# Patient Record
Sex: MALE | Race: BLACK OR AFRICAN AMERICAN | Employment: UNEMPLOYED | URBAN - METROPOLITAN AREA
[De-identification: names, ages, dates, MRNs, and addresses within clinical notes are randomized per-mention and may not be internally consistent; named-entity substitution may affect disease eponyms.]

---

## 2017-05-05 ENCOUNTER — OFFICE VISIT (OUTPATIENT)
Dept: ORTHOPEDIC SURGERY | Age: 52
End: 2017-05-05

## 2017-05-05 VITALS
SYSTOLIC BLOOD PRESSURE: 134 MMHG | HEART RATE: 74 BPM | BODY MASS INDEX: 25.34 KG/M2 | DIASTOLIC BLOOD PRESSURE: 75 MMHG | WEIGHT: 177 LBS | HEIGHT: 70 IN

## 2017-05-05 DIAGNOSIS — M25.561 RIGHT KNEE PAIN, UNSPECIFIED CHRONICITY: Primary | ICD-10-CM

## 2017-05-05 DIAGNOSIS — M11.20 CHONDROCALCINOSIS: ICD-10-CM

## 2017-05-05 PROCEDURE — 20610 DRAIN/INJ JOINT/BURSA W/O US: CPT | Performed by: ORTHOPAEDIC SURGERY

## 2017-05-05 PROCEDURE — 99202 OFFICE O/P NEW SF 15 MIN: CPT | Performed by: ORTHOPAEDIC SURGERY

## 2017-05-05 PROCEDURE — 73562 X-RAY EXAM OF KNEE 3: CPT | Performed by: ORTHOPAEDIC SURGERY

## 2017-05-05 RX ORDER — BETAMETHASONE SODIUM PHOSPHATE AND BETAMETHASONE ACETATE 3; 3 MG/ML; MG/ML
12 INJECTION, SUSPENSION INTRA-ARTICULAR; INTRALESIONAL; INTRAMUSCULAR; SOFT TISSUE ONCE
Status: DISCONTINUED | OUTPATIENT
Start: 2017-05-05 | End: 2020-02-27 | Stop reason: HOSPADM

## 2017-05-26 RX ORDER — NAPROXEN 500 MG/1
500 TABLET ORAL 2 TIMES DAILY
Qty: 20 TABLET | Refills: 2 | Status: SHIPPED | OUTPATIENT
Start: 2017-05-26 | End: 2017-06-07

## 2017-10-25 RX ORDER — NAPROXEN 500 MG/1
500 TABLET ORAL 2 TIMES DAILY
Qty: 20 TABLET | Refills: 2 | Status: ON HOLD | OUTPATIENT
Start: 2017-10-25 | End: 2020-02-27 | Stop reason: HOSPADM

## 2018-03-29 ENCOUNTER — HOSPITAL ENCOUNTER (OUTPATIENT)
Dept: OTHER | Age: 53
Discharge: HOME | End: 2018-03-29
Attending: INTERNAL MEDICINE | Admitting: INTERNAL MEDICINE

## 2018-03-29 LAB
A/G RATIO: 1.5 (ref 1.1–2.2)
ALBUMIN SERPL-MCNC: 4.5 G/DL (ref 3.4–5)
ALP BLD-CCNC: 62 U/L (ref 40–129)
ALT SERPL-CCNC: 21 U/L (ref 10–40)
ANION GAP SERPL CALCULATED.3IONS-SCNC: 11 MMOL/L (ref 3–16)
AST SERPL-CCNC: 24 U/L (ref 15–37)
BASOPHILS ABSOLUTE: 0 K/UL (ref 0–0.2)
BASOPHILS RELATIVE PERCENT: 0.7 %
BILIRUB SERPL-MCNC: 0.4 MG/DL (ref 0–1)
BUN BLDV-MCNC: 15 MG/DL (ref 7–20)
CALCIUM SERPL-MCNC: 8.7 MG/DL (ref 8.3–10.6)
CHLORIDE BLD-SCNC: 102 MMOL/L (ref 99–110)
CO2: 25 MMOL/L (ref 21–32)
CREAT SERPL-MCNC: 1 MG/DL (ref 0.9–1.3)
EOSINOPHILS ABSOLUTE: 0.1 K/UL (ref 0–0.6)
EOSINOPHILS RELATIVE PERCENT: 1.3 %
ESTIMATED AVERAGE GLUCOSE: 119.8 MG/DL
GFR AFRICAN AMERICAN: >60
GFR NON-AFRICAN AMERICAN: >60
GLOBULIN: 3.1 G/DL
GLUCOSE BLD-MCNC: 79 MG/DL (ref 70–99)
HBA1C MFR BLD: 5.8 %
HCT VFR BLD CALC: 44.5 % (ref 40.5–52.5)
HEMOGLOBIN: 15.1 G/DL (ref 13.5–17.5)
LYMPHOCYTES ABSOLUTE: 2 K/UL (ref 1–5.1)
LYMPHOCYTES RELATIVE PERCENT: 39.9 %
MCH RBC QN AUTO: 33.5 PG (ref 26–34)
MCHC RBC AUTO-ENTMCNC: 33.8 G/DL (ref 31–36)
MCV RBC AUTO: 99.1 FL (ref 80–100)
MONOCYTES ABSOLUTE: 0.6 K/UL (ref 0–1.3)
MONOCYTES RELATIVE PERCENT: 11.6 %
NEUTROPHILS ABSOLUTE: 2.3 K/UL (ref 1.7–7.7)
NEUTROPHILS RELATIVE PERCENT: 46.5 %
PDW BLD-RTO: 15.1 % (ref 12.4–15.4)
PLATELET # BLD: 135 K/UL (ref 135–450)
PMV BLD AUTO: 10.8 FL (ref 5–10.5)
POTASSIUM SERPL-SCNC: 4.5 MMOL/L (ref 3.5–5.1)
RBC # BLD: 4.49 M/UL (ref 4.2–5.9)
SODIUM BLD-SCNC: 138 MMOL/L (ref 136–145)
TOTAL PROTEIN: 7.6 G/DL (ref 6.4–8.2)
TSH SERPL DL<=0.05 MIU/L-ACNC: 0.75 UIU/ML (ref 0.27–4.2)
WBC # BLD: 5 K/UL (ref 4–11)

## 2018-03-31 LAB
CHOLESTEROL, TOTAL: 125 MG/DL
EER LIPOPROFILE BY NMR: ABNORMAL
HDL PARTICLE NO, NMR: 38.6 UMOL/L
HDL SIZE: 9.4 NM
HDLC SERPL-MCNC: 63 MG/DL (ref 40–59)
LARGE HDL PARTICLE, NMR: 9.2 UMOL/L
LARGE VLDL PARTICLE, NMR: 6.8 NMOL/L
LDL CHOLESTEROL: 42 MG/DL
LDL PARTICLE NUMBER, NMR: 418 NMOL/L
LDL PARTICLE SIZE: 21.1 NM
SEX HORMONE BINDING GLOBULIN: 34 NMOL/L (ref 11–80)
SMALL LDL PARTICLE, NMR: <165 NMOL/L
TESTOSTERONE FREE-NONMALE: 101.2 PG/ML (ref 47–244)
TESTOSTERONE TOTAL: 492 NG/DL (ref 220–1000)
TRIGL SERPL-MCNC: 102 MG/DL (ref 30–149)
VLDL SIZE: 54.5 NM

## 2019-07-07 ENCOUNTER — HOSPITAL ENCOUNTER (EMERGENCY)
Age: 54
Discharge: HOME | End: 2019-07-07
Attending: EMERGENCY MEDICINE

## 2019-07-07 VITALS
SYSTOLIC BLOOD PRESSURE: 150 MMHG | HEART RATE: 99 BPM | BODY MASS INDEX: 23.09 KG/M2 | WEIGHT: 161.3 LBS | HEIGHT: 70 IN | DIASTOLIC BLOOD PRESSURE: 91 MMHG | OXYGEN SATURATION: 98 % | RESPIRATION RATE: 14 BRPM | TEMPERATURE: 97.3 F

## 2019-07-07 DIAGNOSIS — K05.219 ACUTE PERIODONTAL ABSCESS: ICD-10-CM

## 2019-07-07 DIAGNOSIS — A69.1: Primary | ICD-10-CM

## 2019-07-07 DIAGNOSIS — K02.9 DENTAL DECAY: ICD-10-CM

## 2019-07-07 PROCEDURE — 6370000000 HC RX 637 (ALT 250 FOR IP): Performed by: PHYSICIAN ASSISTANT

## 2019-07-07 PROCEDURE — 99282 EMERGENCY DEPT VISIT SF MDM: CPT

## 2019-07-07 PROCEDURE — 6370000000 HC RX 637 (ALT 250 FOR IP): Performed by: EMERGENCY MEDICINE

## 2019-07-07 RX ORDER — LIDOCAINE HYDROCHLORIDE 20 MG/ML
15 SOLUTION OROPHARYNGEAL ONCE
Status: COMPLETED | OUTPATIENT
Start: 2019-07-07 | End: 2019-07-07

## 2019-07-07 RX ORDER — CLINDAMYCIN HYDROCHLORIDE 150 MG/1
450 CAPSULE ORAL 3 TIMES DAILY
Qty: 63 CAPSULE | Refills: 0 | Status: SHIPPED | OUTPATIENT
Start: 2019-07-07 | End: 2019-07-14

## 2019-07-07 RX ORDER — CHLORHEXIDINE GLUCONATE 0.12 MG/ML
15 RINSE ORAL 2 TIMES DAILY
Qty: 420 ML | Refills: 0 | Status: SHIPPED | OUTPATIENT
Start: 2019-07-07 | End: 2019-07-21

## 2019-07-07 RX ORDER — CLINDAMYCIN HYDROCHLORIDE 150 MG/1
300 CAPSULE ORAL ONCE
Status: COMPLETED | OUTPATIENT
Start: 2019-07-07 | End: 2019-07-07

## 2019-07-07 RX ORDER — HYDROCODONE BITARTRATE AND ACETAMINOPHEN 5; 325 MG/1; MG/1
1 TABLET ORAL EVERY 6 HOURS PRN
Qty: 12 TABLET | Refills: 0 | Status: SHIPPED | OUTPATIENT
Start: 2019-07-07 | End: 2019-07-10

## 2019-07-07 RX ORDER — HYDROCODONE BITARTRATE AND ACETAMINOPHEN 5; 325 MG/1; MG/1
1 TABLET ORAL ONCE
Status: DISCONTINUED | OUTPATIENT
Start: 2019-07-07 | End: 2019-07-07

## 2019-07-07 RX ORDER — IBUPROFEN 200 MG
400 TABLET ORAL ONCE
Status: COMPLETED | OUTPATIENT
Start: 2019-07-07 | End: 2019-07-07

## 2019-07-07 RX ADMIN — CLINDAMYCIN HYDROCHLORIDE 300 MG: 150 CAPSULE ORAL at 10:01

## 2019-07-07 RX ADMIN — IBUPROFEN 400 MG: 200 TABLET, FILM COATED ORAL at 10:01

## 2019-07-07 RX ADMIN — LIDOCAINE HYDROCHLORIDE 15 ML: 20 SOLUTION ORAL; TOPICAL at 10:01

## 2019-07-07 ASSESSMENT — PAIN SCALES - GENERAL: PAINLEVEL_OUTOF10: 8

## 2019-07-07 ASSESSMENT — ENCOUNTER SYMPTOMS
FACIAL SWELLING: 1
VOMITING: 0
TROUBLE SWALLOWING: 0
NAUSEA: 0

## 2019-07-07 NOTE — ED NOTES
Pt states understanding of discharge instructions. Pt agrees to follow up with referral. Pt denies any further needs at this time. Pt ambulated to exit, denies need for wheelchair, gait steady, all pt belongings with pt.         Kadie Chinchilla RN  07/07/19 6770

## 2019-07-07 NOTE — ED PROVIDER NOTES
I independently performed a history and physical on [facility]. All diagnostic, treatment, and disposition decisions were made by myself in conjunction with the advanced practice provider. Briefly, this is a 48 y.o. male here for dental pain and gum disease. The patient has had the pain for the last couple of days, but he admits he does not take good care of his mouth. The pain is severe, rated 8 out of 10. He is also developed swelling above right upper molar. He does not currently have a dentist.  He has not had any shortness of breath or breathing problems. He denies fever or chills. .    On exam, the patient appears well-hydrated, well-nourished, and in no acute distress. Mucous membranes are moist. Speech is clear. Breathing is unlabored. Skin is dry. Mental status is normal. The patient moves all extremities and is without facial droop. Patient has diffusely gray-colored gums with hyperemia and swelling. He has no active bleeding. He also has extensive dental decay in all of his teeth. He has mild swelling in the right face localized over the right upper teeth. There is no active drainage. Airway is intact. He has no stridor. Breathing is unlabored. He is awake, alert, and oriented. Patient Referrals:   Follow up with one of the dentists listed in your discharge paperwork    Schedule an appointment as soon as possible for a visit   follow up with a dentists as soon as possible within the next 1-3 days    [facility] Emergency Department  [address] [place] [address]  [phone]    As needed, If symptoms worsen or new symptoms develop      Discharge Medications:  Discharge Medication List as of 7/7/2019  9:58 AM      START taking these medications    Details   clindamycin (CLEOCIN) 150 MG capsule Take 3 capsules by mouth 3 times daily for 7 days, Disp-63 capsule, R-0Print      HYDROcodone-acetaminophen (NORCO) 5-325 MG per tablet Take 1 tablet by mouth every 6 hours as needed for Pain for up to 3 days. , Disp-12 tablet, R-0Print      chlorhexidine (PERIDEX) 0.12 % solution Take 15 mLs by mouth 2 times daily for 14 days Swish and spit, Disp-420 mL, R-0Print             FINAL IMPRESSION  1. Acute necrotizing gingivitis    2. Acute periodontal abscess    3. Dental decay        Blood pressure (!) 150/91, pulse 99, temperature 97.3 °F (36.3 °C), temperature source Infrared, resp. rate 14, height 5' 10\" (1.778 m), weight 161 lb 4.8 oz (73.2 kg), SpO2 98 %.      For further details of [facility] emergency department encounter, please see documentation by advanced practice provider, ANTONIA Hobbs.        Bc Flores MD  07/08/19 8242

## 2020-02-24 ENCOUNTER — APPOINTMENT (OUTPATIENT)
Dept: GENERAL RADIOLOGY | Age: 55
DRG: 192 | End: 2020-02-24
Payer: COMMERCIAL

## 2020-02-24 ENCOUNTER — HOSPITAL ENCOUNTER (INPATIENT)
Age: 55
LOS: 3 days | Discharge: HOME | DRG: 192 | End: 2020-02-27
Attending: EMERGENCY MEDICINE | Admitting: INTERNAL MEDICINE
Payer: COMMERCIAL

## 2020-02-24 PROBLEM — I50.9 HEART FAILURE, UNSPECIFIED (HCC): Status: ACTIVE | Noted: 2020-02-24

## 2020-02-24 LAB
ALBUMIN SERPL-MCNC: 4.1 G/DL (ref 3.4–5)
ALP BLD-CCNC: 69 U/L (ref 40–129)
ALT SERPL-CCNC: 53 U/L (ref 10–40)
ANION GAP SERPL CALCULATED.3IONS-SCNC: 15 MMOL/L (ref 3–16)
AST SERPL-CCNC: 33 U/L (ref 15–37)
BASE EXCESS VENOUS: -8.7 MMOL/L (ref -3–3)
BILIRUB SERPL-MCNC: 1.2 MG/DL (ref 0–1)
BILIRUBIN DIRECT: 0.3 MG/DL (ref 0–0.3)
BILIRUBIN, INDIRECT: 0.9 MG/DL (ref 0–1)
BUN BLDV-MCNC: 19 MG/DL (ref 7–20)
CALCIUM SERPL-MCNC: 9 MG/DL (ref 8.3–10.6)
CARBOXYHEMOGLOBIN: 6.8 % (ref 0–1.5)
CHLORIDE BLD-SCNC: 106 MMOL/L (ref 99–110)
CO2: 19 MMOL/L (ref 21–32)
CREAT SERPL-MCNC: 1 MG/DL (ref 0.9–1.3)
GFR AFRICAN AMERICAN: >60
GFR NON-AFRICAN AMERICAN: >60
GLUCOSE BLD-MCNC: 108 MG/DL (ref 70–99)
HCO3 VENOUS: 11 MMOL/L (ref 23–29)
LACTIC ACID: 1.3 MMOL/L (ref 0.4–2)
METHEMOGLOBIN VENOUS: 0.3 %
O2 CONTENT, VEN: 16 VOL %
O2 SAT, VEN: 100 %
O2 THERAPY: ABNORMAL
PCO2, VEN: ABNORMAL MMHG (ref 40–50)
PH VENOUS: 7.54 (ref 7.35–7.45)
PO2, VEN: 182 MMHG (ref 25–40)
POTASSIUM REFLEX MAGNESIUM: 4.3 MMOL/L (ref 3.5–5.1)
PRO-BNP: 3415 PG/ML (ref 0–124)
SODIUM BLD-SCNC: 140 MMOL/L (ref 136–145)
TCO2 CALC VENOUS: 26 MMOL/L
TOTAL PROTEIN: 7.2 G/DL (ref 6.4–8.2)
TROPONIN: <0.01 NG/ML
TROPONIN: <0.01 NG/ML

## 2020-02-24 PROCEDURE — 6370000000 HC RX 637 (ALT 250 FOR IP): Performed by: EMERGENCY MEDICINE

## 2020-02-24 PROCEDURE — 36415 COLL VENOUS BLD VENIPUNCTURE: CPT

## 2020-02-24 PROCEDURE — 80048 BASIC METABOLIC PNL TOTAL CA: CPT

## 2020-02-24 PROCEDURE — 94761 N-INVAS EAR/PLS OXIMETRY MLT: CPT

## 2020-02-24 PROCEDURE — 83605 ASSAY OF LACTIC ACID: CPT

## 2020-02-24 PROCEDURE — 99285 EMERGENCY DEPT VISIT HI MDM: CPT

## 2020-02-24 PROCEDURE — 2580000003 HC RX 258: Performed by: PHYSICIAN ASSISTANT

## 2020-02-24 PROCEDURE — 80076 HEPATIC FUNCTION PANEL: CPT

## 2020-02-24 PROCEDURE — 84484 ASSAY OF TROPONIN QUANT: CPT

## 2020-02-24 PROCEDURE — 1200000000 HC SEMI PRIVATE

## 2020-02-24 PROCEDURE — 83880 ASSAY OF NATRIURETIC PEPTIDE: CPT

## 2020-02-24 PROCEDURE — 84443 ASSAY THYROID STIM HORMONE: CPT

## 2020-02-24 PROCEDURE — 94640 AIRWAY INHALATION TREATMENT: CPT

## 2020-02-24 PROCEDURE — 71046 X-RAY EXAM CHEST 2 VIEWS: CPT

## 2020-02-24 PROCEDURE — 6360000002 HC RX W HCPCS: Performed by: EMERGENCY MEDICINE

## 2020-02-24 PROCEDURE — 82803 BLOOD GASES ANY COMBINATION: CPT

## 2020-02-24 PROCEDURE — 96374 THER/PROPH/DIAG INJ IV PUSH: CPT

## 2020-02-24 PROCEDURE — 93005 ELECTROCARDIOGRAM TRACING: CPT | Performed by: EMERGENCY MEDICINE

## 2020-02-24 RX ORDER — FUROSEMIDE 10 MG/ML
40 INJECTION INTRAMUSCULAR; INTRAVENOUS 2 TIMES DAILY
Status: DISCONTINUED | OUTPATIENT
Start: 2020-02-24 | End: 2020-02-25

## 2020-02-24 RX ORDER — ONDANSETRON 2 MG/ML
4 INJECTION INTRAMUSCULAR; INTRAVENOUS EVERY 6 HOURS PRN
Status: DISCONTINUED | OUTPATIENT
Start: 2020-02-24 | End: 2020-02-27

## 2020-02-24 RX ORDER — ACETAMINOPHEN 325 MG/1
650 TABLET ORAL EVERY 6 HOURS PRN
Status: DISCONTINUED | OUTPATIENT
Start: 2020-02-24 | End: 2020-02-27 | Stop reason: DRUGHIGH

## 2020-02-24 RX ORDER — METOLAZONE 2.5 MG/1
2.5 TABLET ORAL ONCE
Status: COMPLETED | OUTPATIENT
Start: 2020-02-24 | End: 2020-02-24

## 2020-02-24 RX ORDER — SODIUM CHLORIDE 0.9 % (FLUSH) 0.9 %
10 SYRINGE (ML) INJECTION EVERY 12 HOURS SCHEDULED
Status: DISCONTINUED | OUTPATIENT
Start: 2020-02-24 | End: 2020-02-27 | Stop reason: HOSPADM

## 2020-02-24 RX ORDER — SODIUM CHLORIDE 0.9 % (FLUSH) 0.9 %
10 SYRINGE (ML) INJECTION PRN
Status: DISCONTINUED | OUTPATIENT
Start: 2020-02-24 | End: 2020-02-27 | Stop reason: HOSPADM

## 2020-02-24 RX ORDER — FAMOTIDINE 20 MG/1
20 TABLET, FILM COATED ORAL 2 TIMES DAILY
Status: DISCONTINUED | OUTPATIENT
Start: 2020-02-24 | End: 2020-02-27 | Stop reason: HOSPADM

## 2020-02-24 RX ORDER — ASPIRIN 81 MG/1
324 TABLET, CHEWABLE ORAL ONCE
Status: COMPLETED | OUTPATIENT
Start: 2020-02-24 | End: 2020-02-24

## 2020-02-24 RX ORDER — IPRATROPIUM BROMIDE AND ALBUTEROL SULFATE 2.5; .5 MG/3ML; MG/3ML
1 SOLUTION RESPIRATORY (INHALATION)
Status: DISCONTINUED | OUTPATIENT
Start: 2020-02-24 | End: 2020-02-25

## 2020-02-24 RX ORDER — FUROSEMIDE 10 MG/ML
60 INJECTION INTRAMUSCULAR; INTRAVENOUS ONCE
Status: COMPLETED | OUTPATIENT
Start: 2020-02-24 | End: 2020-02-24

## 2020-02-24 RX ADMIN — FUROSEMIDE 60 MG: 10 INJECTION, SOLUTION INTRAMUSCULAR; INTRAVENOUS at 19:37

## 2020-02-24 RX ADMIN — NITROGLYCERIN 1 INCH: 20 OINTMENT TOPICAL at 23:16

## 2020-02-24 RX ADMIN — METOLAZONE 2.5 MG: 2.5 TABLET ORAL at 19:44

## 2020-02-24 RX ADMIN — NITROGLYCERIN 1 INCH: 20 OINTMENT TOPICAL at 19:37

## 2020-02-24 RX ADMIN — IPRATROPIUM BROMIDE AND ALBUTEROL SULFATE 1 AMPULE: .5; 3 SOLUTION RESPIRATORY (INHALATION) at 18:58

## 2020-02-24 RX ADMIN — ASPIRIN 81 MG 324 MG: 81 TABLET ORAL at 19:37

## 2020-02-24 RX ADMIN — Medication 10 ML: at 23:23

## 2020-02-24 ASSESSMENT — PAIN SCALES - GENERAL: PAINLEVEL_OUTOF10: 0

## 2020-02-24 NOTE — ED PROVIDER NOTES
Financial resource strain: Not on file    Food insecurity:     Worry: Not on file     Inability: Not on file    Transportation needs:     Medical: Not on file     Non-medical: Not on file   Tobacco Use    Smoking status: Current Every Day Smoker     Types: Cigars    Smokeless tobacco: Never Used    Tobacco comment: 5 cigars/day   Substance and Sexual Activity    Alcohol use:  Yes     Alcohol/week: 4.2 standard drinks     Types: 5 Standard drinks or equivalent per week     Comment: 0cc    Drug use: No    Sexual activity: Not on file   Lifestyle    Physical activity:     Days per week: Not on file     Minutes per session: Not on file    Stress: Not on file   Relationships    Social connections:     Talks on phone: Not on file     Gets together: Not on file     Attends Buddhism service: Not on file     Active member of club or organization: Not on file     Attends meetings of clubs or organizations: Not on file     Relationship status: Not on file    Intimate partner violence:     Fear of current or ex partner: Not on file     Emotionally abused: Not on file     Physically abused: Not on file     Forced sexual activity: Not on file   Other Topics Concern    Not on file   Social History Narrative    Not on file     Current Facility-Administered Medications   Medication Dose Route Frequency Provider Last Rate Last Dose    ipratropium-albuterol (DUONEB) nebulizer solution 1 ampule  1 ampule Inhalation H6J WA Lisseth Altamirano MD   1 ampule at 02/24/20 4187    aspirin chewable tablet 324 mg  324 mg Oral Once Lisseth Altamirano MD        nitroglycerin (NITRO-BID) 2 % ointment 1 inch  1 inch Topical 4 times per day Lisseth Altamirano MD        furosemide (LASIX) injection 60 mg  60 mg Intravenous Once Lisseth Altamirano MD        metOLazone (ZAROXOLYN) tablet 2.5 mg  2.5 mg Oral Once Lisseth Altamirano MD        betamethasone acetate-betamethasone sodium phosphate (CELESTONE) injection 12 mg  12 mg Intra-articular Once Carrol Berman MD         Current Outpatient Medications   Medication Sig Dispense Refill    naproxen (NAPROSYN) 500 MG tablet Take 1 tablet by mouth 2 times daily for 20 doses 20 tablet 2    naproxen (NAPROSYN) 500 MG tablet Take 1 tablet by mouth 2 times daily for 20 doses 20 tablet 0     No Known Allergies    Nursing Notes Reviewed    Physical Exam:  Triage VS:    ED Triage Vitals [02/24/20 1522]   Enc Vitals Group      BP (!) 154/90      Pulse 99      Resp 16      Temp 97.7 °F (36.5 °C)      Temp src       SpO2 98 %      Weight       Height       Head Circumference       Peak Flow       Pain Score       Pain Loc       Pain Edu? Excl. in [address]? My pulse ox interpretation is - normal    General appearance:  No acute distress. Skin:  Warm. Dry. Eye:  Extraocular movements intact. Ears, nose, mouth and throat:  Oral mucosa moist   Neck:  Trachea midline. Extremity:  No swelling. Normal ROM     Heart:  Regular rate and rhythm, normal S1 & S2, no extra heart sounds. Perfusion:  intact  Respiratory:  Lungs clear to auscultation bilaterally. Respirations nonlabored. Abdominal:  Normal bowel sounds. Soft. Nontender. Non distended. Neurological:  Alert and oriented times 3.              Psychiatric:  Appropriate    I have reviewed and interpreted all of the currently available lab results from this visit (if applicable):  Results for orders placed or performed during the hospital encounter of 12/72/74   Basic Metabolic Panel w/ Reflex to MG   Result Value Ref Range    Sodium 140 136 - 145 mmol/L    Potassium reflex Magnesium 4.3 3.5 - 5.1 mmol/L    Chloride 106 99 - 110 mmol/L    CO2 19 (L) 21 - 32 mmol/L    Anion Gap 15 3 - 16    Glucose 108 (H) 70 - 99 mg/dL    BUN 19 7 - 20 mg/dL    CREATININE 1.0 0.9 - 1.3 mg/dL    GFR Non-African American >60 >60    GFR African American >60 >60    Calcium 9.0 8.3 - 10.6 mg/dL   Troponin   Result Value Ref Range Troponin <0.01 <0.01 ng/mL   Brain Natriuretic Peptide   Result Value Ref Range    Pro-BNP 3,415 (H) 0 - 124 pg/mL   Blood Gas, Venous   Result Value Ref Range    pH, Damien 7.539 (H) 7.350 - 7.450    pCO2, Damien see below 40.0 - 50.0 mmHg    pO2, Damien 182.0 (H) 25.0 - 40.0 mmHg    HCO3, Venous 11.0 (L) 23.0 - 29.0 mmol/L    Base Excess, Damien -8.7 (L) -3.0 - 3.0 mmol/L    O2 Sat, Damien 100 Not Established %    Carboxyhemoglobin 6.8 (H) 0.0 - 1.5 %    MetHgb, Damien 0.3 <1.5 %    TC02 (Calc), Damien 26 Not Established mmol/L    O2 Content, Damien 16 Not Established VOL %    O2 Therapy Unknown    Hepatic Function Panel   Result Value Ref Range    Total Protein 7.2 6.4 - 8.2 g/dL    Alb 4.1 3.4 - 5.0 g/dL    Alkaline Phosphatase 69 40 - 129 U/L    ALT 53 (H) 10 - 40 U/L    AST 33 15 - 37 U/L    Total Bilirubin 1.2 (H) 0.0 - 1.0 mg/dL    Bilirubin, Direct 0.3 0.0 - 0.3 mg/dL    Bilirubin, Indirect 0.9 0.0 - 1.0 mg/dL   Lactic Acid, Plasma   Result Value Ref Range    Lactic Acid 1.3 0.4 - 2.0 mmol/L   EKG 12 Lead   Result Value Ref Range    Ventricular Rate 103 BPM    Atrial Rate 103 BPM    P-R Interval 124 ms    QRS Duration 88 ms    Q-T Interval 384 ms    QTc Calculation (Bazett) 503 ms    P Axis 73 degrees    R Axis 101 degrees    T Axis -87 degrees    Diagnosis       Sinus tachycardia with frequent Premature ventricular complexesBiatrial enlargementRightward axisPulmonary disease patternST & T wave abnormality, consider inferolateral ischemiaAbnormal ECG      Radiographs (if obtained):  Radiologist's Report Reviewed:  Xr Chest Standard (2 Vw)    Result Date: 2/24/2020  EXAMINATION: TWO XRAY VIEWS OF THE CHEST 2/24/2020 3:23 pm COMPARISON: Chest x-ray dated 10/30/2015. HISTORY: ORDERING SYSTEM PROVIDED HISTORY: cough TECHNOLOGIST PROVIDED HISTORY: Reason for exam:->cough Reason for Exam: pt with sob and cough x 5 days. pt with hx of asthma states \"I think I need a breathing tx.  Acuity: Acute Type of Exam: Initial FINDINGS:

## 2020-02-25 LAB
ANION GAP SERPL CALCULATED.3IONS-SCNC: 14 MMOL/L (ref 3–16)
BUN BLDV-MCNC: 23 MG/DL (ref 7–20)
CALCIUM SERPL-MCNC: 9 MG/DL (ref 8.3–10.6)
CHLORIDE BLD-SCNC: 104 MMOL/L (ref 99–110)
CHOLESTEROL, TOTAL: 96 MG/DL (ref 0–199)
CO2: 22 MMOL/L (ref 21–32)
CREAT SERPL-MCNC: 1.4 MG/DL (ref 0.9–1.3)
EKG ATRIAL RATE: 103 BPM
EKG ATRIAL RATE: 91 BPM
EKG DIAGNOSIS: NORMAL
EKG DIAGNOSIS: NORMAL
EKG P AXIS: 73 DEGREES
EKG P AXIS: 75 DEGREES
EKG P-R INTERVAL: 124 MS
EKG P-R INTERVAL: 134 MS
EKG Q-T INTERVAL: 384 MS
EKG Q-T INTERVAL: 404 MS
EKG QRS DURATION: 88 MS
EKG QRS DURATION: 94 MS
EKG QTC CALCULATION (BAZETT): 496 MS
EKG QTC CALCULATION (BAZETT): 503 MS
EKG R AXIS: 101 DEGREES
EKG R AXIS: 86 DEGREES
EKG T AXIS: -87 DEGREES
EKG T AXIS: -88 DEGREES
EKG VENTRICULAR RATE: 103 BPM
EKG VENTRICULAR RATE: 91 BPM
GFR AFRICAN AMERICAN: >60
GFR NON-AFRICAN AMERICAN: 53
GLUCOSE BLD-MCNC: 141 MG/DL (ref 70–99)
HCT VFR BLD CALC: 43.2 % (ref 40.5–52.5)
HDLC SERPL-MCNC: 36 MG/DL (ref 40–60)
HEMOGLOBIN: 14.6 G/DL (ref 13.5–17.5)
LDL CHOLESTEROL CALCULATED: 39 MG/DL
LV EF: 18 %
LVEF MODALITY: NORMAL
MAGNESIUM: 1.9 MG/DL (ref 1.8–2.4)
MCH RBC QN AUTO: 32.8 PG (ref 26–34)
MCHC RBC AUTO-ENTMCNC: 33.8 G/DL (ref 31–36)
MCV RBC AUTO: 97.1 FL (ref 80–100)
PDW BLD-RTO: 15.4 % (ref 12.4–15.4)
PLATELET # BLD: 112 K/UL (ref 135–450)
PLATELET SLIDE REVIEW: ABNORMAL
PMV BLD AUTO: 10.6 FL (ref 5–10.5)
POTASSIUM SERPL-SCNC: 4 MMOL/L (ref 3.5–5.1)
RBC # BLD: 4.45 M/UL (ref 4.2–5.9)
SLIDE REVIEW: ABNORMAL
SODIUM BLD-SCNC: 140 MMOL/L (ref 136–145)
TRIGL SERPL-MCNC: 107 MG/DL (ref 0–150)
TROPONIN: <0.01 NG/ML
TSH SERPL DL<=0.05 MIU/L-ACNC: 2.39 UIU/ML (ref 0.27–4.2)
VLDLC SERPL CALC-MCNC: 21 MG/DL
WBC # BLD: 7.1 K/UL (ref 4–11)

## 2020-02-25 PROCEDURE — 93010 ELECTROCARDIOGRAM REPORT: CPT | Performed by: INTERNAL MEDICINE

## 2020-02-25 PROCEDURE — 2580000003 HC RX 258: Performed by: PHYSICIAN ASSISTANT

## 2020-02-25 PROCEDURE — 94760 N-INVAS EAR/PLS OXIMETRY 1: CPT

## 2020-02-25 PROCEDURE — 6360000002 HC RX W HCPCS: Performed by: PHYSICIAN ASSISTANT

## 2020-02-25 PROCEDURE — 99254 IP/OBS CNSLTJ NEW/EST MOD 60: CPT | Performed by: INTERNAL MEDICINE

## 2020-02-25 PROCEDURE — 80048 BASIC METABOLIC PNL TOTAL CA: CPT

## 2020-02-25 PROCEDURE — 83735 ASSAY OF MAGNESIUM: CPT

## 2020-02-25 PROCEDURE — 36415 COLL VENOUS BLD VENIPUNCTURE: CPT

## 2020-02-25 PROCEDURE — 85027 COMPLETE CBC AUTOMATED: CPT

## 2020-02-25 PROCEDURE — 6370000000 HC RX 637 (ALT 250 FOR IP): Performed by: HOSPITALIST

## 2020-02-25 PROCEDURE — 1200000000 HC SEMI PRIVATE

## 2020-02-25 PROCEDURE — 6370000000 HC RX 637 (ALT 250 FOR IP): Performed by: PHYSICIAN ASSISTANT

## 2020-02-25 PROCEDURE — 6370000000 HC RX 637 (ALT 250 FOR IP): Performed by: EMERGENCY MEDICINE

## 2020-02-25 PROCEDURE — 93306 TTE W/DOPPLER COMPLETE: CPT

## 2020-02-25 PROCEDURE — 84484 ASSAY OF TROPONIN QUANT: CPT

## 2020-02-25 PROCEDURE — 93005 ELECTROCARDIOGRAM TRACING: CPT | Performed by: INTERNAL MEDICINE

## 2020-02-25 PROCEDURE — 80061 LIPID PANEL: CPT

## 2020-02-25 PROCEDURE — 6370000000 HC RX 637 (ALT 250 FOR IP): Performed by: INTERNAL MEDICINE

## 2020-02-25 RX ORDER — ATORVASTATIN CALCIUM 40 MG/1
40 TABLET, FILM COATED ORAL NIGHTLY
Status: DISCONTINUED | OUTPATIENT
Start: 2020-02-25 | End: 2020-02-27 | Stop reason: HOSPADM

## 2020-02-25 RX ORDER — LISINOPRIL 5 MG/1
5 TABLET ORAL DAILY
Status: DISCONTINUED | OUTPATIENT
Start: 2020-02-25 | End: 2020-02-25

## 2020-02-25 RX ORDER — IPRATROPIUM BROMIDE AND ALBUTEROL SULFATE 2.5; .5 MG/3ML; MG/3ML
1 SOLUTION RESPIRATORY (INHALATION) EVERY 4 HOURS PRN
Status: DISCONTINUED | OUTPATIENT
Start: 2020-02-25 | End: 2020-02-27 | Stop reason: HOSPADM

## 2020-02-25 RX ORDER — FUROSEMIDE 10 MG/ML
40 INJECTION INTRAMUSCULAR; INTRAVENOUS DAILY
Status: DISCONTINUED | OUTPATIENT
Start: 2020-02-26 | End: 2020-02-27 | Stop reason: HOSPADM

## 2020-02-25 RX ORDER — CARVEDILOL 6.25 MG/1
6.25 TABLET ORAL 2 TIMES DAILY WITH MEALS
Status: DISCONTINUED | OUTPATIENT
Start: 2020-02-25 | End: 2020-02-27 | Stop reason: HOSPADM

## 2020-02-25 RX ORDER — LISINOPRIL 5 MG/1
5 TABLET ORAL 2 TIMES DAILY
Status: DISCONTINUED | OUTPATIENT
Start: 2020-02-25 | End: 2020-02-27 | Stop reason: HOSPADM

## 2020-02-25 RX ORDER — IPRATROPIUM BROMIDE AND ALBUTEROL SULFATE 2.5; .5 MG/3ML; MG/3ML
1 SOLUTION RESPIRATORY (INHALATION) EVERY 4 HOURS PRN
Status: DISCONTINUED | OUTPATIENT
Start: 2020-02-25 | End: 2020-02-25

## 2020-02-25 RX ADMIN — CARVEDILOL 6.25 MG: 6.25 TABLET, FILM COATED ORAL at 11:34

## 2020-02-25 RX ADMIN — Medication 10 ML: at 09:22

## 2020-02-25 RX ADMIN — DESMOPRESSIN ACETATE 40 MG: 0.2 TABLET ORAL at 21:37

## 2020-02-25 RX ADMIN — FUROSEMIDE 40 MG: 10 INJECTION, SOLUTION INTRAMUSCULAR; INTRAVENOUS at 09:19

## 2020-02-25 RX ADMIN — ENOXAPARIN SODIUM 40 MG: 40 INJECTION SUBCUTANEOUS at 09:18

## 2020-02-25 RX ADMIN — FAMOTIDINE 20 MG: 20 TABLET, FILM COATED ORAL at 21:37

## 2020-02-25 RX ADMIN — NITROGLYCERIN 1 INCH: 20 OINTMENT TOPICAL at 06:14

## 2020-02-25 RX ADMIN — LISINOPRIL 5 MG: 5 TABLET ORAL at 16:17

## 2020-02-25 RX ADMIN — FAMOTIDINE 20 MG: 20 TABLET, FILM COATED ORAL at 09:18

## 2020-02-25 RX ADMIN — Medication 10 ML: at 21:39

## 2020-02-25 RX ADMIN — NITROGLYCERIN 1 INCH: 20 OINTMENT TOPICAL at 11:35

## 2020-02-25 RX ADMIN — LISINOPRIL 5 MG: 5 TABLET ORAL at 00:54

## 2020-02-25 RX ADMIN — CARVEDILOL 6.25 MG: 6.25 TABLET, FILM COATED ORAL at 16:17

## 2020-02-25 ASSESSMENT — PAIN SCALES - GENERAL
PAINLEVEL_OUTOF10: 0

## 2020-02-25 NOTE — CONSULTS
[facility]  Cardiac Consult     Referring Provider:  CHICHI Kong CNP         History of Present Illness:  48 y/o male seen for HTN and CHF symptoms. He has a h/o HTN. He says he lost his job and insurance and has not taken his medications in at least 6 months. He notes a 1 week h/o increasing dyspnea. Exacerbated by exertion and improved with rest. No associated chest pain. No significant edema. He also says he has had \"watery\" BMs. He came to the ER and was felt to be in CHF with uncontrolled HTN and admitted. Past Medical History:   has a past medical history of Asthma. Surgical History:   has no past surgical history on file. Social History:   reports that he has been smoking cigars. He has never used smokeless tobacco. He reports current alcohol use of about 4.2 standard drinks of alcohol per week. He reports that he does not use drugs. Family History:  Negative for premature CAD     Medications:   lisinopril  5 mg Oral Daily    nitroglycerin  1 inch Topical 4 times per day    sodium chloride flush  10 mL Intravenous 2 times per day    famotidine  20 mg Oral BID    enoxaparin  40 mg Subcutaneous Daily    furosemide  40 mg Intravenous BID         Allergies:  Patient has no known allergies. ? Medications and dosages reviewed.     ROS:  ?Full ROS obtained and negative except as mentioned in HPI      Physical Examination:    Vitals:    02/25/20 0613   BP: (!) 160/105   Pulse: 97   Resp:    Temp:    SpO2: 93% RA        · GENERAL: Well developed, well nourished, No acute distress  · NEUROLOGICAL: Alert and oriented  · PSYCH: Calm affect  · SKIN: Warm and dry, No visible rash,   · EYES: Pupils equal and round, Sclera non-icteric,   · HENT:  External ears and nose unremarkable, mucus membranes moist  · MUSCULOSKELETAL: Normal cephalic, neck supple  · CAROTID: Normal upstroke, no bruits  · CARDIAC: JVP normal, Normal PMI, regular rate and rhythm, normal S1S2, no murmur,S4 gallop  · RESPIRATORY: Normal respiratory effort, Basilar crackles bilaterally  · EXTREMITIES: No edema  · GASTROINTESTINAL: normal bowel sounds, soft, non-tender, No hepatomegaly     All testing and labs listed below were personally reviewed. CXR  Impression:   Cardiomegaly. Small left pleural effusion and/or basilar atelectasis. LABS  Pro-BNP 3,415High pg/mL   Total Protein 7.2 g/dL Alb 4.1 g/dL Alkaline Phosphatase 69 U/L ALT 53High U/L AST 33 U/L   pH, Damien 7.539High pCO2, Damien see below mmHg   Comment: Unable to report. Result is below reportable range. pO2, Damien 182. 0High mmHg HCO3, Venous 11. 0Low mmol/L Base Excess, Damien -8.7Low mmol/L   Sodium 140 mmol/L Potassium 4.0 mmol/L Chloride 104 mmol/L CO2 22 mmol/L Anion Gap 14 Glucose 141High mg/dL BUN 23High mg/dL CREATININE 1.4High mg/dL   WBC 7.1 K/uL RBC 4.45 M/uL Hemoglobin 14.6 g/dL Hematocrit 43.2 % MCV 97.1 fL MCH 32.8 pg MCHC 33.8 g/dL RDW 15.4 % Platelets [id number] K/uL   Results for Pankaj Guzman (MRN [id number]) as of 2/25/2020 07:51   Ref. Range 2/24/2020 18:28 2/24/2020 22:07 2/25/2020 01:58   Troponin Latest Ref Range: <0.01 ng/mL <0.01 <0.01 <0.01         EKG: Sinus tach, ST and T wave changes, artifact    Repeat: Sinus, PVCs, Probable LVH, ST and T wave changes inferior-lateral-Ischemia vs LVH    Assessment:     HTN:  Poor control as he stopped meds  Started on lisinopril 5-Increase to 10  Add coreg 6.25-Await ECHO to help decide on meds    CHF:  Likely due to HTN  ECHO pending for LV function    Abn EKG:  Suspect this is LVH and not ischemia  He will need some sort of ischemic evaluation. Will wait for ECHO to decide stress vs cath.   If LV f(x) normal with LVH likely will do stress, if depressed likely cath after BP controlled    Plan:    BP control  Diuresis ECHO  futher recs based on above    Thank you for allowing me to participate in the care of this individual.      Alin Connors M.D., [facility]

## 2020-02-26 LAB
ANION GAP SERPL CALCULATED.3IONS-SCNC: 11 MMOL/L (ref 3–16)
BUN BLDV-MCNC: 24 MG/DL (ref 7–20)
CALCIUM SERPL-MCNC: 9 MG/DL (ref 8.3–10.6)
CHLORIDE BLD-SCNC: 101 MMOL/L (ref 99–110)
CO2: 25 MMOL/L (ref 21–32)
CREAT SERPL-MCNC: 1.3 MG/DL (ref 0.9–1.3)
GFR AFRICAN AMERICAN: >60
GFR NON-AFRICAN AMERICAN: 57
GLUCOSE BLD-MCNC: 109 MG/DL (ref 70–99)
POTASSIUM SERPL-SCNC: 4 MMOL/L (ref 3.5–5.1)
PRO-BNP: 2142 PG/ML (ref 0–124)
SODIUM BLD-SCNC: 137 MMOL/L (ref 136–145)

## 2020-02-26 PROCEDURE — 6370000000 HC RX 637 (ALT 250 FOR IP): Performed by: INTERNAL MEDICINE

## 2020-02-26 PROCEDURE — 80048 BASIC METABOLIC PNL TOTAL CA: CPT

## 2020-02-26 PROCEDURE — 6360000002 HC RX W HCPCS: Performed by: INTERNAL MEDICINE

## 2020-02-26 PROCEDURE — 6360000002 HC RX W HCPCS: Performed by: PHYSICIAN ASSISTANT

## 2020-02-26 PROCEDURE — 6370000000 HC RX 637 (ALT 250 FOR IP): Performed by: EMERGENCY MEDICINE

## 2020-02-26 PROCEDURE — 99232 SBSQ HOSP IP/OBS MODERATE 35: CPT | Performed by: INTERNAL MEDICINE

## 2020-02-26 PROCEDURE — 1200000000 HC SEMI PRIVATE

## 2020-02-26 PROCEDURE — 83880 ASSAY OF NATRIURETIC PEPTIDE: CPT

## 2020-02-26 PROCEDURE — 6370000000 HC RX 637 (ALT 250 FOR IP): Performed by: PHYSICIAN ASSISTANT

## 2020-02-26 PROCEDURE — 36415 COLL VENOUS BLD VENIPUNCTURE: CPT

## 2020-02-26 PROCEDURE — 6370000000 HC RX 637 (ALT 250 FOR IP): Performed by: HOSPITALIST

## 2020-02-26 PROCEDURE — 2580000003 HC RX 258: Performed by: PHYSICIAN ASSISTANT

## 2020-02-26 RX ADMIN — DESMOPRESSIN ACETATE 40 MG: 0.2 TABLET ORAL at 21:22

## 2020-02-26 RX ADMIN — Medication 10 ML: at 21:26

## 2020-02-26 RX ADMIN — FAMOTIDINE 20 MG: 20 TABLET, FILM COATED ORAL at 21:22

## 2020-02-26 RX ADMIN — FUROSEMIDE 40 MG: 10 INJECTION, SOLUTION INTRAMUSCULAR; INTRAVENOUS at 09:45

## 2020-02-26 RX ADMIN — NITROGLYCERIN 1 INCH: 20 OINTMENT TOPICAL at 00:10

## 2020-02-26 RX ADMIN — LISINOPRIL 5 MG: 5 TABLET ORAL at 09:43

## 2020-02-26 RX ADMIN — Medication 10 ML: at 10:05

## 2020-02-26 RX ADMIN — FAMOTIDINE 20 MG: 20 TABLET, FILM COATED ORAL at 09:44

## 2020-02-26 RX ADMIN — ENOXAPARIN SODIUM 40 MG: 40 INJECTION SUBCUTANEOUS at 09:45

## 2020-02-26 RX ADMIN — CARVEDILOL 6.25 MG: 6.25 TABLET, FILM COATED ORAL at 17:58

## 2020-02-26 RX ADMIN — LISINOPRIL 5 MG: 5 TABLET ORAL at 17:58

## 2020-02-26 RX ADMIN — CARVEDILOL 6.25 MG: 6.25 TABLET, FILM COATED ORAL at 09:44

## 2020-02-26 ASSESSMENT — PAIN SCALES - GENERAL
PAINLEVEL_OUTOF10: 0

## 2020-02-26 NOTE — CONSULTS
[facility] FAILURE PROGRAM      Roberto Wetzel 1965    History:  Past Medical History:   Diagnosis Date    Asthma        ECHO:       Conclusions      Summary   Left ventricular cavity size is mild to moderately dilated. There is mild concentric left ventricular hypertrophy. Overall, left ventricular systolic function is severely depressed. Ejection fraction is visually estimated to be 15-20%. (Jimenez's=19%)   Severe global hypokinesis. Grade II diastolic dysfunction with elevated LV filling pressures. Mild to moderate mitral regurgitation. The left atrium is mildly dilated. Mild to moderate tricuspid regurgitation with PASP of 25 mmHg. Signature      ------------------------------------------------------------------   Electronically signed by Antony Angelucci, [facility]   (Interpreting physician) on 02/25/2020 at 04:18 PM   ------------------------------------------------------------------     ACE/ARB:  Lisinopril 5 mg bid  BB: Carvedilol 6.25 mg bid   Aldosterone Antagonist: No aldosterone antagonist     History of sleep apnea: No  [place] Screen ordered: Yes        Last Hospital Admission:Multiple ER visits. No hospital admission  Code Status: Full   Discharge plans: Patient to return home    Family Present: No    Patient seen for new diagnosis of systolic heart failure which was explained to him as well as possible causes. He had a history of high blood pressure and was supposed to be taking amlodipine, but lHost his job and could not afford his medications do to [place]. He was instructed to weigh daily and to call according to parameters for weight loss and weight gain. Symptoms of heart failure, low sodium and fluid restriction diet explained. Takes naprosyn frequently and continuous to smoke. Both of which were explained to stop. Medications and activity discussed.          Patient provided with both written and verbal education on CHF signs/ symptoms, causes, discharge medications, smoking cessation, daily weights, low sodium diet, activity, and follow-up. Pt to call if gains 3 pounds in one day or 5 pounds in one week. Mutually agreed upon goals were discussed such as calling the MD as soon as they recognize symptoms and weight gain, maintaining his proper diet, taking medications as prescribed, joining rehab when able. Patient provided with CHF Zone Management tool and CHF symptoms magnet. Discussed importance of lifestyle changes: daily weights, low sodium and fluid restriction diet. PATIENT/CAREGIVER TEACHING:    Level of patient/caregiver understanding able to:   [x ] Verbalize understanding [ ] Demonstrate understanding [ ] Teach back   [ ] Needs reinforcement [ ] Other:       Time spent teachin minutes    1. WEIGHT: Admit Weight: 162 lb (73.5 kg)      Today  Weight: 154 lb 9.6 oz (70.1 kg)   2. I/O     Intake/Output Summary (Last 24 hours) at 2020 1220  Last data filed at 2020 1005  Gross per 24 hour   Intake 850 ml   Output 1775 ml   Net -925 ml       Recommendations:   1. Patient will need a follow up appointment within 7 days at time of discharge  2. Educate further on fluid restriction 48 oz- 64 oz during inpatient stay so he can understand how to measure intake at home. 3. Continue to educate on S/S.   4. Emphasize daily weights, diet, and knowing when and who to call  5. Provided patient with CHF Resource Line for questions and concerns. 6. Patient will needs in hand at time of discharge  7. Patient would make an excellent candidate for Outpatient Shared Medical group to facilitate continued medication compliance and further education.         Lennox Carranza 2020 12:20 PM

## 2020-02-27 VITALS
SYSTOLIC BLOOD PRESSURE: 115 MMHG | HEIGHT: 70 IN | HEART RATE: 85 BPM | DIASTOLIC BLOOD PRESSURE: 87 MMHG | WEIGHT: 152 LBS | TEMPERATURE: 97.4 F | BODY MASS INDEX: 21.76 KG/M2 | OXYGEN SATURATION: 98 % | RESPIRATION RATE: 18 BRPM

## 2020-02-27 PROBLEM — I50.21 ACUTE SYSTOLIC CHF (CONGESTIVE HEART FAILURE), NYHA CLASS 3 (HCC): Status: ACTIVE | Noted: 2020-02-27

## 2020-02-27 LAB
ANION GAP SERPL CALCULATED.3IONS-SCNC: 13 MMOL/L (ref 3–16)
BUN BLDV-MCNC: 26 MG/DL (ref 7–20)
CALCIUM SERPL-MCNC: 9 MG/DL (ref 8.3–10.6)
CHLORIDE BLD-SCNC: 100 MMOL/L (ref 99–110)
CO2: 24 MMOL/L (ref 21–32)
CREAT SERPL-MCNC: 1.2 MG/DL (ref 0.9–1.3)
GFR AFRICAN AMERICAN: >60
GFR NON-AFRICAN AMERICAN: >60
GLUCOSE BLD-MCNC: 99 MG/DL (ref 70–99)
POTASSIUM SERPL-SCNC: 3.6 MMOL/L (ref 3.5–5.1)
SODIUM BLD-SCNC: 137 MMOL/L (ref 136–145)

## 2020-02-27 PROCEDURE — B2111ZZ FLUOROSCOPY OF MULTIPLE CORONARY ARTERIES USING LOW OSMOLAR CONTRAST: ICD-10-PCS | Performed by: INTERNAL MEDICINE

## 2020-02-27 PROCEDURE — 93458 L HRT ARTERY/VENTRICLE ANGIO: CPT | Performed by: INTERNAL MEDICINE

## 2020-02-27 PROCEDURE — C1769 GUIDE WIRE: HCPCS

## 2020-02-27 PROCEDURE — 6360000002 HC RX W HCPCS

## 2020-02-27 PROCEDURE — 2580000003 HC RX 258: Performed by: PHYSICIAN ASSISTANT

## 2020-02-27 PROCEDURE — 2580000003 HC RX 258

## 2020-02-27 PROCEDURE — 2500000003 HC RX 250 WO HCPCS

## 2020-02-27 PROCEDURE — 6370000000 HC RX 637 (ALT 250 FOR IP): Performed by: HOSPITALIST

## 2020-02-27 PROCEDURE — 2709999900 HC NON-CHARGEABLE SUPPLY

## 2020-02-27 PROCEDURE — 4A023N7 MEASUREMENT OF CARDIAC SAMPLING AND PRESSURE, LEFT HEART, PERCUTANEOUS APPROACH: ICD-10-PCS | Performed by: INTERNAL MEDICINE

## 2020-02-27 PROCEDURE — 99152 MOD SED SAME PHYS/QHP 5/>YRS: CPT

## 2020-02-27 PROCEDURE — 2580000003 HC RX 258: Performed by: INTERNAL MEDICINE

## 2020-02-27 PROCEDURE — 80048 BASIC METABOLIC PNL TOTAL CA: CPT

## 2020-02-27 PROCEDURE — 6370000000 HC RX 637 (ALT 250 FOR IP): Performed by: INTERNAL MEDICINE

## 2020-02-27 PROCEDURE — 93458 L HRT ARTERY/VENTRICLE ANGIO: CPT

## 2020-02-27 PROCEDURE — C1894 INTRO/SHEATH, NON-LASER: HCPCS

## 2020-02-27 PROCEDURE — 6370000000 HC RX 637 (ALT 250 FOR IP): Performed by: PHYSICIAN ASSISTANT

## 2020-02-27 RX ORDER — ACETAMINOPHEN 325 MG/1
650 TABLET ORAL EVERY 4 HOURS PRN
Status: DISCONTINUED | OUTPATIENT
Start: 2020-02-27 | End: 2020-02-27 | Stop reason: HOSPADM

## 2020-02-27 RX ORDER — OXYCODONE HYDROCHLORIDE AND ACETAMINOPHEN 5; 325 MG/1; MG/1
1 TABLET ORAL EVERY 4 HOURS PRN
Status: DISCONTINUED | OUTPATIENT
Start: 2020-02-27 | End: 2020-02-27 | Stop reason: HOSPADM

## 2020-02-27 RX ORDER — LISINOPRIL 5 MG/1
5 TABLET ORAL 2 TIMES DAILY
Qty: 30 TABLET | Refills: 3 | Status: SHIPPED | OUTPATIENT
Start: 2020-02-27 | End: 2020-04-17 | Stop reason: DRUGHIGH

## 2020-02-27 RX ORDER — OXYCODONE HYDROCHLORIDE AND ACETAMINOPHEN 5; 325 MG/1; MG/1
2 TABLET ORAL EVERY 4 HOURS PRN
Status: DISCONTINUED | OUTPATIENT
Start: 2020-02-27 | End: 2020-02-27 | Stop reason: HOSPADM

## 2020-02-27 RX ORDER — SODIUM CHLORIDE 9 MG/ML
INJECTION, SOLUTION INTRAVENOUS CONTINUOUS
Status: DISCONTINUED | OUTPATIENT
Start: 2020-02-27 | End: 2020-02-27 | Stop reason: HOSPADM

## 2020-02-27 RX ORDER — FUROSEMIDE 40 MG/1
40 TABLET ORAL DAILY
Qty: 60 TABLET | Refills: 3 | Status: SHIPPED | OUTPATIENT
Start: 2020-02-27 | End: 2020-06-25

## 2020-02-27 RX ORDER — ONDANSETRON 2 MG/ML
4 INJECTION INTRAMUSCULAR; INTRAVENOUS EVERY 6 HOURS PRN
Status: DISCONTINUED | OUTPATIENT
Start: 2020-02-27 | End: 2020-02-27 | Stop reason: HOSPADM

## 2020-02-27 RX ORDER — ATORVASTATIN CALCIUM 40 MG/1
40 TABLET, FILM COATED ORAL NIGHTLY
Qty: 30 TABLET | Refills: 3 | Status: SHIPPED | OUTPATIENT
Start: 2020-02-27 | End: 2020-11-24

## 2020-02-27 RX ORDER — SODIUM CHLORIDE 0.9 % (FLUSH) 0.9 %
10 SYRINGE (ML) INJECTION EVERY 12 HOURS SCHEDULED
Status: DISCONTINUED | OUTPATIENT
Start: 2020-02-27 | End: 2020-02-27 | Stop reason: HOSPADM

## 2020-02-27 RX ORDER — CARVEDILOL 6.25 MG/1
6.25 TABLET ORAL 2 TIMES DAILY WITH MEALS
Qty: 60 TABLET | Refills: 3 | Status: SHIPPED | OUTPATIENT
Start: 2020-02-27 | End: 2020-03-03

## 2020-02-27 RX ORDER — SODIUM CHLORIDE 0.9 % (FLUSH) 0.9 %
10 SYRINGE (ML) INJECTION PRN
Status: DISCONTINUED | OUTPATIENT
Start: 2020-02-27 | End: 2020-02-27 | Stop reason: HOSPADM

## 2020-02-27 RX ADMIN — FAMOTIDINE 20 MG: 20 TABLET, FILM COATED ORAL at 09:15

## 2020-02-27 RX ADMIN — CARVEDILOL 6.25 MG: 6.25 TABLET, FILM COATED ORAL at 19:26

## 2020-02-27 RX ADMIN — LISINOPRIL 5 MG: 5 TABLET ORAL at 09:15

## 2020-02-27 RX ADMIN — DESMOPRESSIN ACETATE 40 MG: 0.2 TABLET ORAL at 19:26

## 2020-02-27 RX ADMIN — CARVEDILOL 6.25 MG: 6.25 TABLET, FILM COATED ORAL at 09:15

## 2020-02-27 RX ADMIN — LISINOPRIL 5 MG: 5 TABLET ORAL at 19:26

## 2020-02-27 RX ADMIN — SODIUM CHLORIDE: 9 INJECTION, SOLUTION INTRAVENOUS at 15:43

## 2020-02-27 RX ADMIN — Medication 10 ML: at 09:16

## 2020-02-27 ASSESSMENT — PAIN SCALES - GENERAL
PAINLEVEL_OUTOF10: 0

## 2020-02-27 NOTE — PRE SEDATION
fentanyl intravenously    Patient is an appropriate candidate for plan of sedation: yes      Electronically signed by Krystle Ramirez MD on 2/27/2020 at 8:52 AM

## 2020-02-27 NOTE — PLAN OF CARE
Problem: Cardiac:  Goal: Hemodynamic stability will improve  Description  Hemodynamic stability will improve  2/27/2020 0706 by Frank Matamoros RN  Outcome: Ongoing     Problem: Cardiac:  Goal: Ability to maintain an adequate cardiac output will improve  Description  Ability to maintain an adequate cardiac output will improve  2/27/2020 0706 by Frank Matamoros RN  Outcome: Ongoing     Problem: Fluid Volume:  Goal: Risk for excess fluid volume will decrease  Description  Risk for excess fluid volume will decrease  2/27/2020 0706 by Frank Matamoros RN  Outcome: Ongoing  2/27/2020 0705 by Frank Matamoros RN  Outcome: Ongoing  2/26/2020 2016 by Ayan Foley RN  Outcome: Met This Shift
Problem: Fluid Volume:  Goal: Risk for excess fluid volume will decrease  Description  Risk for excess fluid volume will decrease  Outcome: Ongoing  Note:   Continue to monitor labs vitals heart rate and rhythm I&O daily weights medications as directed CHF RN consult and ECHO ordered     Problem: Intellectual/Education/Knowledge Deficit  Goal: Teaching initiated upon admission  Outcome: Ongoing  Note:   Pt is a newly diagnosed CHF. CHF pamphlet provided to the pt.  Pt also advised we will be monitoring weights QAM I&O's and medication information provided on new medication lisinopril
completed  Outcome: Met This Shift

## 2020-02-27 NOTE — CARE COORDINATION
Patient discharging to home  All discharge needs met per case management    Meenu Vang RN, BSN  [phone]

## 2020-02-27 NOTE — OP NOTE
Patient:  Parul Barnes   :   1965    Procedural Summary  ~Consent:   Obtained written and verbal consent      Risks/benefits explained in detail  ~Procedure:    Left Heart Catheterization  ~Medications:    Procedural sedation with minimal conscious sedation  ~Complications:   None  ~Blood Loss:    <10cc  ~Specimens:    None obtained  ~Pre-sedation re-evaluation: Performed immediately prior to procedure. Medication and Procedural Reconciliation:  All medications and procedures were administered or performed by me or under my direct supervision. Cardiac Cath : Anatomy:   LM-Normal   LAD-Normal  Cx-Normal   OM- Normal   RCA-Dominant  RPDA- Normal     LVEDP- 10      Contrast: 20cc  Flouro Time:12m  Access: R radial    Impression  ~Coronary Angiography w/ Normal Coronaries  ~Normal LVEDP  ~Non ischemic Cardiomyopathy        Recommendation  ~Aggressive medical treatment and risk factor modification  ~1. Medications reviewed, no changes at this time. 2. Post cath IVF. Bedrest.  3. No indicationanti platelet therapy  4. Patient has been advised on the importance of regular exercise of at least 20-30 minutes daily alternating with aerobic and isometric activities. 5. Patient counseled about and offered assistance for smoking cessation   6. No indication for cardiac rehab  4. Follow up in 1-2 weeks with cardiology   7. Cont coreg, lisinopril, statin. Start lasix 40mg po daily. Start aldactone in the office. Echo in 90 Days. ICD if LVEF<35% at 90 days      OK for discharge home.     Dl Garner MD 2020 2:06 PM

## 2020-02-27 NOTE — PROGRESS NOTES
, [place]      Hospitalist Progress Note      PCP: CHICHI Rees - CNP    Date of Admission: 2/24/2020    Chief Complaint: Shortness of breath    Hospital Course: Pt. Is a  63-year-old male admitted with dyspnea, elevated BNP, left pleural effusion. 2D echo with EF 15-20%; cardiology consulted. Pt. Is on Lasix IV. Awaiting cardiology for whether pt. Needs a stress test vs. Cardiac cath. Subjective: Pt. Denies any chest pain or shortness of breath. He denies any diarrhea. Medications:  Reviewed    Infusion Medications   Scheduled Medications    carvedilol  6.25 mg Oral BID WC    lisinopril  5 mg Oral BID    furosemide  40 mg Intravenous Daily    atorvastatin  40 mg Oral Nightly    nitroglycerin  1 inch Topical 4 times per day    sodium chloride flush  10 mL Intravenous 2 times per day    famotidine  20 mg Oral BID    enoxaparin  40 mg Subcutaneous Daily     PRN Meds: ipratropium-albuterol, sodium chloride flush, acetaminophen, magnesium hydroxide, ondansetron      Intake/Output Summary (Last 24 hours) at 2/26/2020 1437  Last data filed at 2/26/2020 1400  Gross per 24 hour   Intake 850 ml   Output 2075 ml   Net -1225 ml       Physical Exam Performed:    /74   Pulse 85   Temp 96.6 °F (35.9 °C)   Resp 18   Ht 5' 10\" (1.778 m)   Wt 154 lb 9.6 oz (70.1 kg)   SpO2 94%   BMI 22.18 kg/m²     General appearance: No apparent distress, appears stated age and cooperative. HEENT: Pupils equal, round, and reactive to light. Conjunctivae/corneas clear. Neck: Supple, with full range of motion. No jugular venous distention. Trachea midline. Respiratory:  Normal respiratory effort. Clear to auscultation, bilaterally without Rales/Wheezes/Rhonchi. Cardiovascular: RRR  Abdomen: Soft, non-tender, non-distended with normal bowel sounds. Musculoskeletal: No clubbing, cyanosis or edema bilaterally. Full range of motion without deformity.   Skin: Skin color, texture, turgor normal.  No rashes or
Nutrition Education    Type and Reason for Visit: Consult, Patient Education    Nutrition Assessment:  Pt admitted for CHF. Provided heart failure diet education. Education included low sodium diet guidelines (3-4 gm Na+/day) and fluid restriction (64 oz/day). Reviewed foods to choose and foods to avoid, along with label reading and ways to add flavor to food. Pt states understanding of education. Time spent with patient: 5 minutes. · Verbally reviewed information with Patient  · Written educational materials provided. · Contact name and number provided. · Refer to Patient Education activity for more details.     Electronically signed by Mal Calix RD, LD on 2/25/20 at 12:40 PM    Contact Number: [phone]
Pt returned from cath lab. A&o x4. Vss. Rt radial cath site without bleeding/hematoma. Radial band in place, c/d/i.  Pt on bedrest.
The [place] Sleepiness Scale       The [place] Sleepiness Scale is widely used in the field of sleep medicine as a subjective measure of a patient's sleepiness. The test is a list of eight situations in which you rate your tendency to become sleepy on a scale of 0, no chance to 3, high chance of dozing. Your score is based on a scale of 0 to 24. The scale estimates whether you are experiencing excessive sleepiness that possibly requires medical attention. How Sleepy Are You? How sleepy are you to doze off or fall asleep in the following situations? You should rate your chances of dozing off, not just feeling tired. Even if you have not done some of these things recently try to determine how they would have affected you.  For each situation, decide whether or not you would have:     0 = No chance of dozing 1 = Slight chance of dozing   2 = Moderate chance of  dozing 3 = High change of dozing       Situation                                                                                     Chance of Dozing    Sitting and reading  0 =  []  1 =    [] 2 =    [] 3 =    [x]    Watching TV  0 =  [x]  1 =    [] 2 =    [] 3 =    []      Sitting inactive in public place (e.g., a theater or a meeting)  0 =  [x]  1 =    [] 2 =    [] 3 =    []    As a passenger in a car for an hour without a break          0  =  [x]  1 =    [] 2 =    [] 3 =    []    Lying down to rest in the afternoon when circumstances permit    0 =  []  1 =    [] 2 =    [] 3 =    [x]    Sitting and talking to someone  0 =  [x]  1 =    [] 2 =    [] 3 =    []      Sitting quietly after a lunch without alcohol  0 =  []  1 =    [] 2 =    [] 3 =    [x]    In a car, while stopped for a few minutes in traffic                                                                      0 =  [x]  1 =    [] 2 =    [] 3 =    []    Total Score = 9    If your total score is 10 or greater, you are experiencing excessive sleepiness and should consider seeking a medical
deformity. Skin: Skin color, texture, turgor normal.  No rashes or lesions. Neurologic:  Neurovascularly intact without any focal sensory/motor deficits. Cranial nerves: II-XII intact, grossly non-focal.  Psychiatric: Alert and oriented, thought content appropriate, normal insight  Capillary Refill: Brisk,< 3 seconds   Peripheral Pulses: +2 palpable, equal bilaterally       Labs:   Recent Labs     02/25/20  0508   WBC 7.1   HGB 14.6   HCT 43.2   *     Recent Labs     02/25/20  0508 02/26/20  0504 02/27/20  0535    137 137   K 4.0 4.0 3.6    101 100   CO2 22 25 24   BUN 23* 24* 26*   CREATININE 1.4* 1.3 1.2   CALCIUM 9.0 9.0 9.0     Recent Labs     02/24/20  1828   AST 33   ALT 53*   BILIDIR 0.3   BILITOT 1.2*   ALKPHOS 69     No results for input(s): INR in the last 72 hours. Recent Labs     02/24/20  1828 02/24/20  2207 02/25/20  0158   TROPONINI <0.01 <0.01 <0.01       Urinalysis:    No results found for: Farrel Maclachlan, BACTERIA, RBCUA, BLOODU, SPECGRAV, GLUCOSEU    Radiology:  XR CHEST STANDARD (2 VW)   Final Result   Cardiomegaly. Small left pleural effusion and/or basilar atelectasis. Assessment/Plan:    Active Hospital Problems    Diagnosis    Acute systolic CHF (congestive heart failure), NYHA class 3 ([facility]) [I50.21]    [facility], [facility] [I50.9]     1. Admitted with dyspnea, elevated BNP diagnosis of systolic CHF, continue with the diuresis 2D echo today -   Left ventricular cavity size is mild to moderately dilated.   There is mild concentric left ventricular hypertrophy.   Overall, left ventricular systolic function is severely depressed.   Ejection fraction is visually estimated to be 15-20%.  (Jimenez's=19%)   Severe global hypokinesis.   Grade II diastolic dysfunction with elevated LV filling pressures.   Mild to moderate mitral regurgitation.   The left atrium is mildly dilated.   Mild to moderate tricuspid regurgitation with PASP of 25
monitor BMP daily. 4.  Add statin.     DVT Prophylaxis: Lovenox subcu  Diet: DIET LOW SODIUM 2 GM;  Code Status: Full Code    PT/OT Eval Status:     Digna Spencer MD

## 2020-02-27 NOTE — DISCHARGE SUMMARY
BMI 21.81 kg/m²       General appearance:  No apparent distress, appears stated age and cooperative. HEENT:  Normal cephalic, atraumatic without obvious deformity. Pupils equal, round, and reactive to light. Extra ocular muscles intact. Conjunctivae/corneas clear. Neck: Supple, with full range of motion. No jugular venous distention. Trachea midline. Respiratory:  Normal respiratory effort. Clear to auscultation, bilaterally without Rales/Wheezes/Rhonchi. Cardiovascular:  Regular rate and rhythm with normal S1/S2 without murmurs, rubs or gallops. Abdomen: Soft, non-tender, non-distended with normal bowel sounds. Musculoskeletal:  No clubbing, cyanosis or edema bilaterally. Full range of motion without deformity. Skin: Skin color, texture, turgor normal.  No rashes or lesions. Neurologic:  Neurovascularly intact without any focal sensory/motor deficits. Cranial nerves: II-XII intact, grossly non-focal.  Psychiatric:  Alert and oriented, thought content appropriate, normal insight  Capillary Refill: Brisk,< 3 seconds   Peripheral Pulses: +2 palpable, equal bilaterally       Labs: For convenience and continuity at follow-up the following most recent labs are provided:      CBC:    Lab Results   Component Value Date    WBC 7.1 02/25/2020    HGB 14.6 02/25/2020    HCT 43.2 02/25/2020     02/25/2020       Renal:    Lab Results   Component Value Date     02/27/2020    K 3.6 02/27/2020    K 4.3 02/24/2020     02/27/2020    CO2 24 02/27/2020    BUN 26 02/27/2020    CREATININE 1.2 02/27/2020    CALCIUM 9.0 02/27/2020         Significant Diagnostic Studies    Radiology:   XR CHEST STANDARD (2 VW)   Final Result   Cardiomegaly. Small left pleural effusion and/or basilar atelectasis.                 Consults:     IP CONSULT TO HOSPITALIST  IP CONSULT TO CARDIOLOGY  IP CONSULT TO HEART FAILURE NURSE/COORDINATOR  IP CONSULT TO DIETITIAN  IP CONSULT TO SOCIAL WORK  IP CONSULT TO CARDIAC

## 2020-02-28 ENCOUNTER — TELEPHONE (OUTPATIENT)
Dept: OTHER | Age: 55
End: 2020-02-28

## 2020-02-28 NOTE — TELEPHONE ENCOUNTER
[facility]  TELEPHONE ENCOUNTER FORM    Evelina Dubon 1965    ASSESSMENT:   1. Baseline weight: 152 lbs  2. Current weight: 150.6 lbs  3. Patient weighing daily: Yes  4. Symptoms: dyspnea, fatigue  5. Patient knows who to call with symptoms: Yes  6. Diet history:      Patient states sodium limitation is : yes, 2,000 mg a day      Patient states fluid limitation is 64 ounces  Patient following diet as instructed: Yes  7. Medication history: taking medications as instructed No; medication side effects noted No  8. Patient is involved in exercise program: No  9. Patient knows date and time of follow up appointment: Yes  10. Patient has transportation to appointments: Yes    RECOMMENDATIONS:   1. Medication: none  2. Diet: low sodium  3. MD/ Clinic appointment: 3/320 LISSY Sharif NP  4. Other: Patient discharged home without medications.

## 2020-03-03 ENCOUNTER — OFFICE VISIT (OUTPATIENT)
Dept: CARDIOLOGY CLINIC | Age: 55
End: 2020-03-03
Payer: COMMERCIAL

## 2020-03-03 ENCOUNTER — HOSPITAL ENCOUNTER (OUTPATIENT)
Age: 55
Discharge: HOME | End: 2020-03-03
Payer: COMMERCIAL

## 2020-03-03 VITALS
DIASTOLIC BLOOD PRESSURE: 60 MMHG | SYSTOLIC BLOOD PRESSURE: 108 MMHG | WEIGHT: 161 LBS | HEART RATE: 82 BPM | HEIGHT: 70 IN | OXYGEN SATURATION: 98 % | BODY MASS INDEX: 23.05 KG/M2

## 2020-03-03 LAB
ANION GAP SERPL CALCULATED.3IONS-SCNC: 13 MMOL/L (ref 3–16)
BUN BLDV-MCNC: 20 MG/DL (ref 7–20)
CALCIUM SERPL-MCNC: 9.1 MG/DL (ref 8.3–10.6)
CHLORIDE BLD-SCNC: 102 MMOL/L (ref 99–110)
CO2: 27 MMOL/L (ref 21–32)
CREAT SERPL-MCNC: 1.3 MG/DL (ref 0.9–1.3)
GFR AFRICAN AMERICAN: >60
GFR NON-AFRICAN AMERICAN: 57
GLUCOSE BLD-MCNC: 96 MG/DL (ref 70–99)
POTASSIUM SERPL-SCNC: 4.8 MMOL/L (ref 3.5–5.1)
PRO-BNP: 973 PG/ML (ref 0–124)
SODIUM BLD-SCNC: 142 MMOL/L (ref 136–145)

## 2020-03-03 PROCEDURE — 80048 BASIC METABOLIC PNL TOTAL CA: CPT

## 2020-03-03 PROCEDURE — 99215 OFFICE O/P EST HI 40 MIN: CPT | Performed by: CLINICAL NURSE SPECIALIST

## 2020-03-03 PROCEDURE — 83880 ASSAY OF NATRIURETIC PEPTIDE: CPT

## 2020-03-03 PROCEDURE — 36415 COLL VENOUS BLD VENIPUNCTURE: CPT

## 2020-03-03 RX ORDER — CARVEDILOL 12.5 MG/1
12.5 TABLET ORAL 2 TIMES DAILY WITH MEALS
Qty: 60 TABLET | Refills: 0 | Status: SHIPPED | OUTPATIENT
Start: 2020-03-03 | End: 2020-12-09 | Stop reason: SDUPTHER

## 2020-03-03 NOTE — PROGRESS NOTES
[facility] ([place]  Progress Note    Primary Care Doctor:  Francisca Lynn, APRN - CNP    Chief Complaint   Patient presents with    Other     HF clinic  Denies cardiac symptoms        History of Present Illness:  47 y.o. male with history of non compliance with medications, uncontrolled HTN, alcohol and smokes    I had the pleasure of seeing Luz Maria Blanco in follow up for hospitalization 2/24-27/2020 for acute sHF with LVEF of 15-20%, diuresed; uncontrolled HTN due to running out of insurance and stopping his medications; ABRAN on CKD. probnp [id number]  He is ambulatory and by his self today. He denies any sob, edema, lightheadedness or chest pain. He attended his first shared medical class (living with heart disease and pharmacy). His weight at home is staying 162. His pulse is up. Phone call 2/28/2020    Past Medical History:   has a past medical history of Asthma. Surgical History:   has no past surgical history on file. Social History:   reports that he has been smoking cigars. He has never used smokeless tobacco. He reports current alcohol use of about 4.2 standard drinks of alcohol per week. He reports that he does not use drugs. Family History:   History reviewed. No pertinent family history. Home Medications:  Prior to Admission medications    Medication Sig Start Date End Date Taking? Authorizing Provider   carvedilol (COREG) 12.5 MG tablet Take 1 tablet by mouth 2 times daily (with meals) 3/3/20  Yes CHICHI Hernandez - CNS   atorvastatin (LIPITOR) 40 MG tablet Take 1 tablet by mouth nightly 2/27/20  Yes Samra Phillips MD   lisinopril (PRINIVIL;ZESTRIL) 5 MG tablet Take 1 tablet by mouth 2 times daily 2/27/20  Yes Samra Phillips MD   furosemide (LASIX) 40 MG tablet Take 1 tablet by mouth daily 2/27/20  Yes Samra Phillips MD        Allergies:  Patient has no known allergies. Review of Systems:   · Constitutional: there has been no unanticipated weight loss.  There's been no change in energy level, sleep pattern, or activity level. · Eyes: No visual changes or diplopia. No scleral icterus. · ENT: No Headaches, hearing loss or vertigo. No mouth sores or sore throat. · Cardiovascular: Reviewed in HPI  · Respiratory: No cough or wheezing, no sputum production. No hematemesis. · Gastrointestinal: No abdominal pain, appetite loss, blood in stools. No change in bowel or bladder habits. · Genitourinary: No dysuria, trouble voiding, or hematuria. · Musculoskeletal:  No gait disturbance, weakness or joint complaints. · Integumentary: No rash or pruritis. · Neurological: No headache, diplopia, change in muscle strength, numbness or tingling. No change in gait, balance, coordination, mood, affect, memory, mentation, behavior. · Psychiatric: No anxiety, no depression. · Endocrine: No malaise, fatigue or temperature intolerance. No excessive thirst, fluid intake, or urination. No tremor. · Hematologic/Lymphatic: No abnormal bruising or bleeding, blood clots or swollen lymph nodes. · Allergic/Immunologic: No nasal congestion or hives. Physical Examination:    Vitals:    03/03/20 1411   BP: 108/60   Site: Left Upper Arm   Position: Sitting   Cuff Size: Medium Adult   Pulse: 82   SpO2: 98%   Weight: 161 lb (73 kg)   Height: 5' 10\" (1.778 m)        Constitutional and General Appearance: Warm and dry, no apparent distress, normal coloration  HEENT:  Normocephalic, atraumatic  Respiratory:  · Normal excursion and expansion without use of accessory muscles  · Resp Auscultation: Normal breath sounds without dullness  Cardiovascular:  · The apical impulses not displaced  · Heart tones are crisp and normal  · JVP normal cm H2O  · Regular rate and rhythm  · Peripheral pulses are symmetrical and full  · There is no clubbing, cyanosis of the extremities.   · no edema  · Pedal Pulses: 2+ and equal   Abdomen:  · No masses or tenderness  · Liver/Spleen: No Abnormalities Noted  Neurological/Psychiatric:  · Alert and oriented in all spheres  · Moves all extremities well  · Exhibits normal gait balance and coordination  · No abnormalities of mood, affect, memory, mentation, or behavior are noted    Lab Data:    CBC:   Lab Results   Component Value Date    WBC 7.1 2020    WBC 5.0 2018    RBC 4.45 2020    RBC 4.49 2018    HGB 14.6 2020    HGB 15.1 2018    HCT 43.2 2020    HCT 44.5 2018    MCV 97.1 2020    MCV 99.1 2018    RDW 15.4 2020    RDW 15.1 2018     2020     2018     BMP:  Lab Results   Component Value Date     2020     2020     2020    K 3.6 2020    K 4.0 2020    K 4.0 2020    K 4.3 2020     2020     2020     2020    CO2 24 2020    CO2 25 2020    CO2 22 2020    BUN 26 2020    BUN 24 2020    BUN 23 2020    CREATININE 1.2 2020    CREATININE 1.3 2020    CREATININE 1.4 2020     BNP:   Lab Results   Component Value Date    PROBNP 2,142 2020    PROBNP 3,415 2020     Cardiac Imagin2020 Cardiac Cath : Dr Harris Gonzales  Anatomy:   LM-Normal   LAD-Normal  Cx-Normal   OM- Normal   RCA-Dominant  RPDA- Normal      LVEDP- 10     Impression  ~Coronary Angiography w/ Normal Coronaries  ~Normal LVEDP  ~Non ischemic Cardiomyopathy    Echo 2020  Summary   Left ventricular cavity size is mild to moderately dilated. There is mild concentric left ventricular hypertrophy. Overall, left ventricular systolic function is severely depressed. Ejection fraction is visually estimated to be 15-20%. (Jimenez's=19%)   Severe global hypokinesis. Grade II diastolic dysfunction with elevated LV filling pressures. Mild to moderate mitral regurgitation. The left atrium is mildly dilated.   Mild to moderate tricuspid regurgitation with PASP of

## 2020-03-03 NOTE — PATIENT INSTRUCTIONS
1.  Increase coreg to 12.5 mg twice a day sent to [facility]  2. Check blood work today  3.  <64 ounces and <2000 mg sodium diet  4. March 17th at 145 pm shared medical class  5. Financial aid card given to patient  6.   Cardiac rehab once financial clearance

## 2020-03-04 ENCOUNTER — TELEPHONE (OUTPATIENT)
Dept: CARDIOLOGY CLINIC | Age: 55
End: 2020-03-04

## 2020-03-17 ENCOUNTER — OFFICE VISIT (OUTPATIENT)
Dept: CARDIOLOGY CLINIC | Age: 55
End: 2020-03-17
Payer: COMMERCIAL

## 2020-03-17 VITALS
DIASTOLIC BLOOD PRESSURE: 80 MMHG | HEIGHT: 70 IN | SYSTOLIC BLOOD PRESSURE: 110 MMHG | BODY MASS INDEX: 22.48 KG/M2 | OXYGEN SATURATION: 99 % | HEART RATE: 68 BPM | WEIGHT: 157 LBS

## 2020-03-17 PROCEDURE — 99214 OFFICE O/P EST MOD 30 MIN: CPT | Performed by: CLINICAL NURSE SPECIALIST

## 2020-03-17 PROCEDURE — G8484 FLU IMMUNIZE NO ADMIN: HCPCS | Performed by: CLINICAL NURSE SPECIALIST

## 2020-03-17 PROCEDURE — G8427 DOCREV CUR MEDS BY ELIG CLIN: HCPCS | Performed by: CLINICAL NURSE SPECIALIST

## 2020-03-17 PROCEDURE — 1111F DSCHRG MED/CURRENT MED MERGE: CPT | Performed by: CLINICAL NURSE SPECIALIST

## 2020-03-17 PROCEDURE — 4004F PT TOBACCO SCREEN RCVD TLK: CPT | Performed by: CLINICAL NURSE SPECIALIST

## 2020-03-17 PROCEDURE — G8420 CALC BMI NORM PARAMETERS: HCPCS | Performed by: CLINICAL NURSE SPECIALIST

## 2020-03-17 PROCEDURE — 3017F COLORECTAL CA SCREEN DOC REV: CPT | Performed by: CLINICAL NURSE SPECIALIST

## 2020-03-17 NOTE — PATIENT INSTRUCTIONS
1.  Continue all current medications  2. Continue <2000 mg sodium and <64 ounces fluid  3. Cardiac rehab referral once reopens  4.   RTO in 2 weeks 3/31/2020

## 2020-03-17 NOTE — PROGRESS NOTES
[address]  Progress Note    Primary Care Doctor:  Viki Nino, APRN - CNP    Chief Complaint   Patient presents with    Congestive Heart Failure        History of Present Illness:  47 y.o. male with history of non compliance with medications, uncontrolled HTN, alcohol and smokes  2/24-27/2020 for acute sHF with LVEF of 15-20%, diuresed; uncontrolled HTN due to running out of insurance and stopping his medications; ABRAN on CKD    I had the pleasure of seeing Keisha Zaragoza in follow up for sHF. He attended his second shared medical class (nutrition) today. He feels great, continues to smoke a cigar and drink about 3 beers a week. He is unable to do cardiac rehab due to the closure (coronavirus). His labs improved at last appointment. He denies any sob, palpitations, lightheadedness or edema. HR improved after coreg increased at last appt    Past Medical History:   has a past medical history of Asthma. Surgical History:   has no past surgical history on file. Social History:   reports that he has been smoking cigars. He has never used smokeless tobacco. He reports current alcohol use of about 4.2 standard drinks of alcohol per week. He reports that he does not use drugs. Family History:   History reviewed. No pertinent family history. Home Medications:  Prior to Admission medications    Medication Sig Start Date End Date Taking? Authorizing Provider   carvedilol (COREG) 12.5 MG tablet Take 1 tablet by mouth 2 times daily (with meals) 3/3/20  Yes Mirna Quispe APRN - CNS   atorvastatin (LIPITOR) 40 MG tablet Take 1 tablet by mouth nightly 2/27/20  Yes Marlon Gupta MD   lisinopril (PRINIVIL;ZESTRIL) 5 MG tablet Take 1 tablet by mouth 2 times daily 2/27/20  Yes Marlon Gupta MD   furosemide (LASIX) 40 MG tablet Take 1 tablet by mouth daily 2/27/20  Yes Marlon Gupta MD        Allergies:  Patient has no known allergies.      Review of Systems:   · Constitutional: there has been no unanticipated weight loss. There's been no change in energy level, sleep pattern, or activity level. · Eyes: No visual changes or diplopia. No scleral icterus. · ENT: No Headaches, hearing loss or vertigo. No mouth sores or sore throat. · Cardiovascular: Reviewed in HPI  · Respiratory: No cough or wheezing, no sputum production. No hematemesis. · Gastrointestinal: No abdominal pain, appetite loss, blood in stools. No change in bowel or bladder habits. · Genitourinary: No dysuria, trouble voiding, or hematuria. · Musculoskeletal:  No gait disturbance, weakness or joint complaints. · Integumentary: No rash or pruritis. · Neurological: No headache, diplopia, change in muscle strength, numbness or tingling. No change in gait, balance, coordination, mood, affect, memory, mentation, behavior. · Psychiatric: No anxiety, no depression. · Endocrine: No malaise, fatigue or temperature intolerance. No excessive thirst, fluid intake, or urination. No tremor. · Hematologic/Lymphatic: No abnormal bruising or bleeding, blood clots or swollen lymph nodes. · Allergic/Immunologic: No nasal congestion or hives. Physical Examination:    Vitals:    03/17/20 1404   BP: 110/80   Pulse: 68   Weight: 157 lb (71.2 kg)   Height: 5' 10\" (1.778 m)        Constitutional and General Appearance: Warm and dry, no apparent distress, normal coloration  HEENT:  Normocephalic, atraumatic  Respiratory:  · Normal excursion and expansion without use of accessory muscles  · Resp Auscultation: Normal breath sounds without dullness  Cardiovascular:  · The apical impulses not displaced  · Heart tones are crisp and normal  · JVP normal cm H2O  · Regular rate and rhythm  · Peripheral pulses are symmetrical and full  · There is no clubbing, cyanosis of the extremities.   · no edema  · Pedal Pulses: 2+ and equal   Abdomen:  · No masses or tenderness  · Liver/Spleen: No Abnormalities Noted  Neurological/Psychiatric:  · Alert and oriented in

## 2020-04-17 ENCOUNTER — OFFICE VISIT (OUTPATIENT)
Dept: CARDIOLOGY CLINIC | Age: 55
End: 2020-04-17
Payer: COMMERCIAL

## 2020-04-17 VITALS
SYSTOLIC BLOOD PRESSURE: 120 MMHG | WEIGHT: 164.8 LBS | OXYGEN SATURATION: 99 % | HEART RATE: 54 BPM | BODY MASS INDEX: 23.59 KG/M2 | HEIGHT: 70 IN | DIASTOLIC BLOOD PRESSURE: 80 MMHG

## 2020-04-17 PROCEDURE — 99214 OFFICE O/P EST MOD 30 MIN: CPT | Performed by: CLINICAL NURSE SPECIALIST

## 2020-04-17 PROCEDURE — 3017F COLORECTAL CA SCREEN DOC REV: CPT | Performed by: CLINICAL NURSE SPECIALIST

## 2020-04-17 PROCEDURE — G8420 CALC BMI NORM PARAMETERS: HCPCS | Performed by: CLINICAL NURSE SPECIALIST

## 2020-04-17 PROCEDURE — 4004F PT TOBACCO SCREEN RCVD TLK: CPT | Performed by: CLINICAL NURSE SPECIALIST

## 2020-04-17 PROCEDURE — G8427 DOCREV CUR MEDS BY ELIG CLIN: HCPCS | Performed by: CLINICAL NURSE SPECIALIST

## 2020-04-17 RX ORDER — LISINOPRIL 5 MG/1
15 TABLET ORAL DAILY
Qty: 90 TABLET | Refills: 1 | Status: SHIPPED | OUTPATIENT
Start: 2020-04-17 | End: 2020-11-19

## 2020-04-17 NOTE — PATIENT INSTRUCTIONS
1.  Cardiac referral  2. Increase lisinopril 15 mg once a day  3. Blood work when you see Dr Smith Fus May 18 th  4.   Continue all current medications

## 2020-04-17 NOTE — PROGRESS NOTES
[address]  Progress Note    Primary Care Doctor:  CHICHI Kramer - CNP    Chief Complaint   Patient presents with    Congestive Heart Failure        History of Present Illness:  47 y.o. male with history of non compliance with medications, uncontrolled HTN, alcohol and smokes  2/24-27/2020 for acute sHF with LVEF of 15-20%, diuresed; uncontrolled HTN due to running out of insurance and stopping his medications; ABRAN on CKD    I had the pleasure of seeing Alena Arias in follow up for sHF. He is ambulatory and by his self today. He admits to continuing his alcohol ?3 beers a week, no hard liquor and still smoking. His weight is up but he denies any sob, edema, lightheadedness or chest pain. 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 Last labs improved bnp. Past Medical History:   has a past medical history of Asthma. Surgical History:   has no past surgical history on file. Social History:   reports that he has been smoking cigars. He has never used smokeless tobacco. He reports current alcohol use of about 4.2 standard drinks of alcohol per week. He reports that he does not use drugs. Family History:   History reviewed. No pertinent family history. Home Medications:  Prior to Admission medications    Medication Sig Start Date End Date Taking? Authorizing Provider   lisinopril (PRINIVIL;ZESTRIL) 5 MG tablet Take 3 tablets by mouth daily 4/17/20  Yes CHICHI Pittman   carvedilol (COREG) 12.5 MG tablet Take 1 tablet by mouth 2 times daily (with meals) 3/3/20  Yes CHICHI Pittman   atorvastatin (LIPITOR) 40 MG tablet Take 1 tablet by mouth nightly 2/27/20  Yes Shelby Cheung MD   furosemide (LASIX) 40 MG tablet Take 1 tablet by mouth daily 2/27/20  Yes Shelby Cheung MD        Allergies:  Patient has no known allergies. Review of Systems:   · Constitutional: there has been no unanticipated weight loss.  There's been no change in spheres  · Moves all extremities well  · Exhibits normal gait balance and coordination  · No abnormalities of mood, affect, memory, mentation, or behavior are noted    Lab Data:    CBC:   Lab Results   Component Value Date    WBC 7.1 2020    WBC 5.0 2018    RBC 4.45 2020    RBC 4.49 2018    HGB 14.6 2020    HGB 15.1 2018    HCT 43.2 2020    HCT 44.5 2018    MCV 97.1 2020    MCV 99.1 2018    RDW 15.4 2020    RDW 15.1 2018     2020     2018     BMP:  Lab Results   Component Value Date     2020     2020     2020    K 4.8 2020    K 3.6 2020    K 4.0 2020    K 4.3 2020     2020     2020     2020    CO2 27 2020    CO2 24 2020    CO2 25 2020    BUN 20 2020    BUN 26 2020    BUN 24 2020    CREATININE 1.3 2020    CREATININE 1.2 2020    CREATININE 1.3 2020     BNP:   Lab Results   Component Value Date    PROBNP 973 2020    PROBNP 2,142 2020    PROBNP 3,415 2020     Cardiac Imagin2020 Cardiac Cath : Dr Alison Ceja  Anatomy:   LM-Normal   LAD-Normal  Cx-Normal   OM- Normal   RCA-Dominant  RPDA- Normal      LVEDP- 10     Impression  ~Coronary Angiography w/ Normal Coronaries  ~Normal LVEDP  ~Non ischemic Cardiomyopathy    Echo 2020  Summary   Left ventricular cavity size is mild to moderately dilated. There is mild concentric left ventricular hypertrophy. Overall, left ventricular systolic function is severely depressed. Ejection fraction is visually estimated to be 15-20%. (Jimenez's=19%)   Severe global hypokinesis. Grade II diastolic dysfunction with elevated LV filling pressures. Mild to moderate mitral regurgitation. The left atrium is mildly dilated. Mild to moderate tricuspid regurgitation with PASP of 25 mmHg    Assessment:    1.  Chronic

## 2020-06-23 NOTE — PROGRESS NOTES
tingling. No change in gait, balance, coordination, mood, affect, memory, mentation, behavior. · Psychiatric: No anxiety, no depression. · Endocrine: No malaise, fatigue or temperature intolerance. No excessive thirst, fluid intake, or urination. No tremor. · Hematologic/Lymphatic: No abnormal bruising or bleeding, blood clots or swollen lymph nodes. · Allergic/Immunologic: No nasal congestion or hives. Physical Examination:    Vitals:    06/25/20 1441   BP: 106/72   Pulse: 76   SpO2: 94%   Weight: 155 lb (70.3 kg)   Height: 5' 10\" (1.778 m)     Body mass index is 22.24 kg/m². Wt Readings from Last 3 Encounters:   06/25/20 155 lb (70.3 kg)   04/17/20 164 lb 12.8 oz (74.8 kg)   03/17/20 157 lb (71.2 kg)     BP Readings from Last 3 Encounters:   06/25/20 106/72   04/17/20 120/80   03/17/20 110/80     Constitutional and General Appearance:   WD/WN in NAD  HEENT:  NC/AT  SERVANDO  No problems with hearing  Skin:  Warm, dry  Respiratory:  · Normal excursion and expansion without use of accessory muscles  · Resp Auscultation: Normal breath sounds without dullness  Cardiovascular:  · The apical impulses not displaced  · Heart tones are crisp and normal  · Cervical veins are not engorged  · The carotid upstroke is normal in amplitude and contour without delay or bruit  · JVP less than 8 cm H2O  RRR with nl S1 and S2 without m,r,g  · Peripheral pulses are symmetrical and full  · There is no clubbing, cyanosis of the extremities.   · No edema  · Femoral Arteries: 2+ and equal  · Pedal Pulses: 2+ and equal   Neck:  · No thyromegaly  Abdomen:  · No masses or tenderness  · Liver/Spleen: No Abnormalities Noted  Neurological/Psychiatric:  · Alert and oriented in all spheres  · Moves all extremities well  · Exhibits normal gait balance and coordination  · No abnormalities of mood, affect, memory, mentation, or behavior are noted    ASSESSMENT/PLAN:    Diagnostics:      Echo:  2/15/20  Summary   Left ventricular cavity size

## 2020-06-25 ENCOUNTER — OFFICE VISIT (OUTPATIENT)
Dept: CARDIOLOGY CLINIC | Age: 55
End: 2020-06-25
Payer: COMMERCIAL

## 2020-06-25 VITALS
SYSTOLIC BLOOD PRESSURE: 106 MMHG | HEART RATE: 76 BPM | OXYGEN SATURATION: 94 % | DIASTOLIC BLOOD PRESSURE: 72 MMHG | BODY MASS INDEX: 22.19 KG/M2 | WEIGHT: 155 LBS | HEIGHT: 70 IN

## 2020-06-25 PROBLEM — Z91.199 NONCOMPLIANCE: Status: ACTIVE | Noted: 2020-06-25

## 2020-06-25 PROBLEM — I50.22 CHRONIC SYSTOLIC CHF (CONGESTIVE HEART FAILURE) (HCC): Status: ACTIVE | Noted: 2020-06-25

## 2020-06-25 PROBLEM — F17.200 SMOKER: Status: ACTIVE | Noted: 2020-06-25

## 2020-06-25 PROBLEM — I10 ESSENTIAL HYPERTENSION: Status: ACTIVE | Noted: 2020-06-25

## 2020-06-25 PROCEDURE — 99244 OFF/OP CNSLTJ NEW/EST MOD 40: CPT | Performed by: INTERNAL MEDICINE

## 2020-06-25 PROCEDURE — G8427 DOCREV CUR MEDS BY ELIG CLIN: HCPCS | Performed by: INTERNAL MEDICINE

## 2020-06-25 PROCEDURE — G8420 CALC BMI NORM PARAMETERS: HCPCS | Performed by: INTERNAL MEDICINE

## 2020-06-25 RX ORDER — FUROSEMIDE 20 MG/1
20 TABLET ORAL DAILY
Qty: 90 TABLET | Refills: 3
Start: 2020-06-25 | End: 2020-10-12 | Stop reason: SDUPTHER

## 2020-10-12 RX ORDER — FUROSEMIDE 20 MG/1
TABLET ORAL
Qty: 45 TABLET | Refills: 1 | Status: SHIPPED | OUTPATIENT
Start: 2020-10-12 | End: 2020-10-26 | Stop reason: DRUGHIGH

## 2020-10-12 NOTE — TELEPHONE ENCOUNTER
Medication Refill    Medication needing refilled:furosemide (LASIX) 20 MG tablet     Dosage of the medication:    How are you taking this medication (QD, BID, TID, QID, PRN):    30 or 90 day supply called in:    Which Pharmacy are we sending the medication to?:[address] [facility], [address] [place] [address]

## 2020-10-12 NOTE — TELEPHONE ENCOUNTER
Virginia Russell said he has been taking Lasix maybe every other day. He feels fine, no SOB or swelling. He still has some pills left. I strongly advised him to get his labs drawn (per the orders that were placed in June) either this week or next. He said he would and then will call us back to schedule f/u alesha't (he no showed RG alesha't 7/17/2020). I pended a refill for QOD dosing.

## 2020-10-15 ENCOUNTER — HOSPITAL ENCOUNTER (OUTPATIENT)
Age: 55
Discharge: HOME | End: 2020-10-15
Payer: COMMERCIAL

## 2020-10-15 LAB
ANION GAP SERPL CALCULATED.3IONS-SCNC: 10 MMOL/L (ref 3–16)
BUN BLDV-MCNC: 16 MG/DL (ref 7–20)
CALCIUM SERPL-MCNC: 9 MG/DL (ref 8.3–10.6)
CHLORIDE BLD-SCNC: 105 MMOL/L (ref 99–110)
CO2: 27 MMOL/L (ref 21–32)
CREAT SERPL-MCNC: 1.2 MG/DL (ref 0.9–1.3)
GFR AFRICAN AMERICAN: >60
GFR NON-AFRICAN AMERICAN: >60
GLUCOSE BLD-MCNC: 111 MG/DL (ref 70–99)
HCT VFR BLD CALC: 46.2 % (ref 40.5–52.5)
HEMOGLOBIN: 15.8 G/DL (ref 13.5–17.5)
MCH RBC QN AUTO: 33.2 PG (ref 26–34)
MCHC RBC AUTO-ENTMCNC: 34.1 G/DL (ref 31–36)
MCV RBC AUTO: 97.3 FL (ref 80–100)
PDW BLD-RTO: 15.1 % (ref 12.4–15.4)
PLATELET # BLD: 111 K/UL (ref 135–450)
PMV BLD AUTO: 11.7 FL (ref 5–10.5)
POTASSIUM SERPL-SCNC: 4.4 MMOL/L (ref 3.5–5.1)
PRO-BNP: 1242 PG/ML (ref 0–124)
RBC # BLD: 4.75 M/UL (ref 4.2–5.9)
SODIUM BLD-SCNC: 142 MMOL/L (ref 136–145)
WBC # BLD: 7.2 K/UL (ref 4–11)

## 2020-10-15 PROCEDURE — 80048 BASIC METABOLIC PNL TOTAL CA: CPT

## 2020-10-15 PROCEDURE — 85027 COMPLETE CBC AUTOMATED: CPT

## 2020-10-15 PROCEDURE — 83880 ASSAY OF NATRIURETIC PEPTIDE: CPT

## 2020-10-15 PROCEDURE — 36415 COLL VENOUS BLD VENIPUNCTURE: CPT

## 2020-10-19 ENCOUNTER — TELEPHONE (OUTPATIENT)
Dept: CARDIOLOGY CLINIC | Age: 55
End: 2020-10-19

## 2020-10-19 NOTE — TELEPHONE ENCOUNTER
Pt. Says his weight is at 165 lbs and there has been no shortness of breath. He wants to know if he needs to double his water pill? Please advise.

## 2020-10-19 NOTE — TELEPHONE ENCOUNTER
----- Message from CHICHI Almonte sent at 10/19/2020  8:27 AM EDT -----  Fluid level is up  What is his weight and any sob?   thanks

## 2020-10-26 ENCOUNTER — OFFICE VISIT (OUTPATIENT)
Dept: CARDIOLOGY CLINIC | Age: 55
End: 2020-10-26
Payer: COMMERCIAL

## 2020-10-26 VITALS
WEIGHT: 164 LBS | HEART RATE: 71 BPM | SYSTOLIC BLOOD PRESSURE: 120 MMHG | DIASTOLIC BLOOD PRESSURE: 80 MMHG | BODY MASS INDEX: 23.48 KG/M2 | HEIGHT: 70 IN | OXYGEN SATURATION: 97 %

## 2020-10-26 PROCEDURE — 3017F COLORECTAL CA SCREEN DOC REV: CPT | Performed by: CLINICAL NURSE SPECIALIST

## 2020-10-26 PROCEDURE — G8427 DOCREV CUR MEDS BY ELIG CLIN: HCPCS | Performed by: CLINICAL NURSE SPECIALIST

## 2020-10-26 PROCEDURE — 99214 OFFICE O/P EST MOD 30 MIN: CPT | Performed by: CLINICAL NURSE SPECIALIST

## 2020-10-26 PROCEDURE — 4004F PT TOBACCO SCREEN RCVD TLK: CPT | Performed by: CLINICAL NURSE SPECIALIST

## 2020-10-26 PROCEDURE — G8420 CALC BMI NORM PARAMETERS: HCPCS | Performed by: CLINICAL NURSE SPECIALIST

## 2020-10-26 PROCEDURE — G8484 FLU IMMUNIZE NO ADMIN: HCPCS | Performed by: CLINICAL NURSE SPECIALIST

## 2020-10-26 RX ORDER — FUROSEMIDE 20 MG/1
TABLET ORAL
Qty: 45 TABLET | Refills: 1 | Status: SHIPPED
Start: 2020-10-26 | End: 2020-12-02 | Stop reason: DRUGHIGH

## 2020-10-26 NOTE — PATIENT INSTRUCTIONS
1.  Stop smoking and drinking alcohol  2. Lasix do 40 mg alternating with 20 mg   3. Check blood work in 2 weeks  4. Cardiac rehab  5.   RTO in 2 months

## 2020-10-26 NOTE — PROGRESS NOTES
[facility]  Progress Note    Primary Care Doctor:  Kavya Diamond APRN - CNP    Chief Complaint   Patient presents with    1 Month Follow-Up    Congestive Heart Failure        History of Present Illness:  54 y.o. male with history of non compliance with medications, uncontrolled HTN, alcohol and smokes  2/24-27/2020 for acute sHF with LVEF of 15-20%, diuresed; uncontrolled HTN due to running out of insurance and stopping his medications; ABRAN on CKD    I had the pleasure of seeing Joon Calles in follow up for sHF. He is ambulatory and by his self today. He admits to drinking 2 beers every other day and still smoking 1 cigarette. He did not do cardiac rehab. Recent labs showed an increase in his probnp. He had cut back on his lasix per Dr Jens Nelson after visit in June but did not follow up. He did increase lasix to 40 mg about a week ago. His weight is stable at 164. He denies any sob, palpitations, lightheadedness or chest pain. He denies missing any medications    Past Medical History:   has a past medical history of Asthma. Surgical History:   has no past surgical history on file. Social History:   reports that he has been smoking cigars. He has never used smokeless tobacco. He reports current alcohol use of about 4.2 standard drinks of alcohol per week. He reports that he does not use drugs. Family History:   History reviewed. No pertinent family history. Home Medications:  Prior to Admission medications    Medication Sig Start Date End Date Taking?  Authorizing Provider   furosemide (LASIX) 20 MG tablet 40 mg alternating with 20 mg 10/26/20  Yes CHICHI Quintana   lisinopril (PRINIVIL;ZESTRIL) 5 MG tablet Take 3 tablets by mouth daily 4/17/20  Yes CHICHI Quintana   carvedilol (COREG) 12.5 MG tablet Take 1 tablet by mouth 2 times daily (with meals) 3/3/20  Yes CHICHI Quintana   atorvastatin (LIPITOR) 40 MG tablet Take 1 tablet by mouth nightly 2/27/20  Yes Antoine Centeno MD        Allergies:  Patient has no known allergies. Review of Systems:   · Constitutional: there has been no unanticipated weight loss. There's been no change in energy level, sleep pattern, or activity level. · Eyes: No visual changes or diplopia. No scleral icterus. · ENT: No Headaches, hearing loss or vertigo. No mouth sores or sore throat. · Cardiovascular: Reviewed in HPI  · Respiratory: No cough or wheezing, no sputum production. No hematemesis. · Gastrointestinal: No abdominal pain, appetite loss, blood in stools. No change in bowel or bladder habits. · Genitourinary: No dysuria, trouble voiding, or hematuria. · Musculoskeletal:  No gait disturbance, weakness or joint complaints. · Integumentary: No rash or pruritis. · Neurological: No headache, diplopia, change in muscle strength, numbness or tingling. No change in gait, balance, coordination, mood, affect, memory, mentation, behavior. · Psychiatric: No anxiety, no depression. · Endocrine: No malaise, fatigue or temperature intolerance. No excessive thirst, fluid intake, or urination. No tremor. · Hematologic/Lymphatic: No abnormal bruising or bleeding, blood clots or swollen lymph nodes. · Allergic/Immunologic: No nasal congestion or hives.     Physical Examination:    Vitals:    10/26/20 1458   BP: 120/80   Site: Left Upper Arm   Position: Sitting   Cuff Size: Medium Adult   Pulse: 71   SpO2: 97%   Weight: 164 lb (74.4 kg)   Height: 5' 10\" (1.778 m)        Constitutional and General Appearance: Warm and dry, no apparent distress, normal coloration  HEENT:  Normocephalic, atraumatic  Respiratory:  · Normal excursion and expansion without use of accessory muscles  · Resp Auscultation: Normal breath sounds without dullness  Cardiovascular:  · The apical impulses not displaced  · Heart tones are crisp and normal  · JVP normal cm H2O  · Regular rate and rhythm  · Peripheral pulses are symmetrical and full  · There is no clubbing, cyanosis of the extremities. · no edema  · Pedal Pulses: 2+ and equal   Abdomen:  · No masses or tenderness  · Liver/Spleen: No Abnormalities Noted  Neurological/Psychiatric:  · Alert and oriented in all spheres  · Moves all extremities well  · Exhibits normal gait balance and coordination  · No abnormalities of mood, affect, memory, mentation, or behavior are noted    Lab Data:    CBC:   Lab Results   Component Value Date    WBC 7.2 10/15/2020    WBC 7.1 2020    WBC 5.0 2018    RBC 4.75 10/15/2020    RBC 4.45 2020    RBC 4.49 2018    HGB 15.8 10/15/2020    HGB 14.6 2020    HGB 15.1 2018    HCT 46.2 10/15/2020    HCT 43.2 2020    HCT 44.5 2018    MCV 97.3 10/15/2020    MCV 97.1 2020    MCV 99.1 2018    RDW 15.1 10/15/2020    RDW 15.4 2020    RDW 15.1 2018     10/15/2020     2020     2018     BMP:  Lab Results   Component Value Date     10/15/2020     2020     2020    K 4.4 10/15/2020    K 4.8 2020    K 3.6 2020    K 4.3 2020     10/15/2020     2020     2020    CO2 27 10/15/2020    CO2 27 2020    CO2 24 2020    BUN 16 10/15/2020    BUN 20 2020    BUN 26 2020    CREATININE 1.2 10/15/2020    CREATININE 1.3 2020    CREATININE 1.2 2020     BNP:   Lab Results   Component Value Date    PROBNP 1,242 10/15/2020    PROBNP 973 2020    PROBNP 2,142 2020     Cardiac Imagin2020 Cardiac Cath : Dr Julieta Ballesteros  Anatomy:   LM-Normal   LAD-Normal  Cx-Normal   OM- Normal   RCA-Dominant  RPDA- Normal      LVEDP- 10     Impression  ~Coronary Angiography w/ Normal Coronaries  ~Normal LVEDP  ~Non ischemic Cardiomyopathy    Echo 2020  Summary   Left ventricular cavity size is mild to moderately dilated. There is mild concentric left ventricular hypertrophy.    Overall, left ventricular systolic function is severely depressed. Ejection fraction is visually estimated to be 15-20%. (Jimenez's=19%)   Severe global hypokinesis. Grade II diastolic dysfunction with elevated LV filling pressures. Mild to moderate mitral regurgitation. The left atrium is mildly dilated. Mild to moderate tricuspid regurgitation with PASP of 25 mmHg    Assessment:    1. Chronic systolic heart failure (HCC) on ace, bb; hold on aldosterone antagonist due to compliance history   2. Essential hypertension    3. Noncompliance    4. Smoker    5. Alcohol abuse         Plan:   Patient Instructions   1. Stop smoking and drinking alcohol  2. Lasix do 40 mg alternating with 20 mg   3. Check blood work in 2 weeks  4. Cardiac rehab  5. RTO in 2 months        I appreciate the opportunity of cooperating in the care of this individual.    LUIS Serrato, 10/26/2020, 4:31 PM    QUALITY MEASURES  1. Tobacco Cessation Counseling: Yes  2. Retake of BP if >140/90:   NA  3. Documentation to PCP/referring for new patient:  Sent to PCP at close of office visit  4. CAD patient on anti-platelet: NA  5. CAD patient on STATIN therapy:  Yes  6.  Patient with CHF and aFib on anticoagulation:  NA

## 2020-11-24 RX ORDER — ATORVASTATIN CALCIUM 40 MG/1
TABLET, FILM COATED ORAL
Qty: 30 TABLET | Refills: 1 | Status: SHIPPED | OUTPATIENT
Start: 2020-11-24 | End: 2020-12-09 | Stop reason: SDUPTHER

## 2020-12-02 RX ORDER — FUROSEMIDE 20 MG/1
TABLET ORAL
Qty: 45 TABLET | Refills: 1 | Status: SHIPPED
Start: 2020-12-02 | End: 2020-12-09 | Stop reason: SDUPTHER

## 2020-12-02 NOTE — TELEPHONE ENCOUNTER
Luz Hansen [facility] calling they need a new script for Rx furosemide (LASIX) NPRG changed directions and need updated script called in. [facility], [address]  [facility] call to advise Thank you

## 2020-12-09 RX ORDER — CARVEDILOL 12.5 MG/1
12.5 TABLET ORAL 2 TIMES DAILY WITH MEALS
Qty: 60 TABLET | Refills: 1 | Status: SHIPPED | OUTPATIENT
Start: 2020-12-09 | End: 2021-03-03 | Stop reason: SDUPTHER

## 2020-12-09 RX ORDER — ATORVASTATIN CALCIUM 40 MG/1
40 TABLET, FILM COATED ORAL DAILY
Qty: 30 TABLET | Refills: 1 | Status: SHIPPED | OUTPATIENT
Start: 2020-12-09 | End: 2021-03-30 | Stop reason: SDUPTHER

## 2020-12-09 RX ORDER — LISINOPRIL 5 MG/1
5 TABLET ORAL DAILY
Qty: 60 TABLET | Refills: 1 | Status: SHIPPED | OUTPATIENT
Start: 2020-12-09 | End: 2021-03-30 | Stop reason: SDUPTHER

## 2020-12-09 RX ORDER — FUROSEMIDE 20 MG/1
TABLET ORAL
Qty: 45 TABLET | Refills: 1 | Status: SHIPPED | OUTPATIENT
Start: 2020-12-09 | End: 2021-03-03 | Stop reason: SDUPTHER

## 2021-03-03 ENCOUNTER — HOSPITAL ENCOUNTER (OUTPATIENT)
Age: 56
Discharge: HOME | End: 2021-03-03
Payer: COMMERCIAL

## 2021-03-03 ENCOUNTER — TELEPHONE (OUTPATIENT)
Dept: CARDIOLOGY CLINIC | Age: 56
End: 2021-03-03

## 2021-03-03 ENCOUNTER — OFFICE VISIT (OUTPATIENT)
Dept: CARDIOLOGY CLINIC | Age: 56
End: 2021-03-03
Payer: COMMERCIAL

## 2021-03-03 VITALS
HEART RATE: 101 BPM | HEIGHT: 70 IN | WEIGHT: 158 LBS | DIASTOLIC BLOOD PRESSURE: 88 MMHG | OXYGEN SATURATION: 99 % | BODY MASS INDEX: 22.62 KG/M2 | SYSTOLIC BLOOD PRESSURE: 132 MMHG

## 2021-03-03 DIAGNOSIS — F17.200 SMOKER: ICD-10-CM

## 2021-03-03 DIAGNOSIS — E78.5 HYPERLIPIDEMIA, UNSPECIFIED HYPERLIPIDEMIA TYPE: ICD-10-CM

## 2021-03-03 DIAGNOSIS — I50.22 CHRONIC SYSTOLIC CHF (CONGESTIVE HEART FAILURE) (HCC): Primary | ICD-10-CM

## 2021-03-03 DIAGNOSIS — F10.10 ALCOHOL ABUSE: ICD-10-CM

## 2021-03-03 DIAGNOSIS — I50.22 CHRONIC SYSTOLIC CHF (CONGESTIVE HEART FAILURE) (HCC): ICD-10-CM

## 2021-03-03 DIAGNOSIS — I10 ESSENTIAL HYPERTENSION: ICD-10-CM

## 2021-03-03 DIAGNOSIS — Z91.199 NONCOMPLIANCE: ICD-10-CM

## 2021-03-03 LAB
A/G RATIO: 1.3 (ref 1.1–2.2)
ALBUMIN SERPL-MCNC: 3.9 G/DL (ref 3.4–5)
ALP BLD-CCNC: 76 U/L (ref 40–129)
ALT SERPL-CCNC: 33 U/L (ref 10–40)
ANION GAP SERPL CALCULATED.3IONS-SCNC: 11 MMOL/L (ref 3–16)
AST SERPL-CCNC: 36 U/L (ref 15–37)
BILIRUB SERPL-MCNC: 0.6 MG/DL (ref 0–1)
BUN BLDV-MCNC: 12 MG/DL (ref 7–20)
CALCIUM SERPL-MCNC: 8.9 MG/DL (ref 8.3–10.6)
CHLORIDE BLD-SCNC: 105 MMOL/L (ref 99–110)
CHOLESTEROL, TOTAL: 89 MG/DL (ref 0–199)
CO2: 25 MMOL/L (ref 21–32)
CREAT SERPL-MCNC: 1 MG/DL (ref 0.9–1.3)
GFR AFRICAN AMERICAN: >60
GFR NON-AFRICAN AMERICAN: >60
GLOBULIN: 3 G/DL
GLUCOSE BLD-MCNC: 100 MG/DL (ref 70–99)
HDLC SERPL-MCNC: 55 MG/DL (ref 40–60)
LDL CHOLESTEROL CALCULATED: 24 MG/DL
POTASSIUM SERPL-SCNC: 4.5 MMOL/L (ref 3.5–5.1)
PRO-BNP: 1806 PG/ML (ref 0–124)
SODIUM BLD-SCNC: 141 MMOL/L (ref 136–145)
TOTAL PROTEIN: 6.9 G/DL (ref 6.4–8.2)
TRIGL SERPL-MCNC: 49 MG/DL (ref 0–150)
VLDLC SERPL CALC-MCNC: 10 MG/DL

## 2021-03-03 PROCEDURE — 36415 COLL VENOUS BLD VENIPUNCTURE: CPT

## 2021-03-03 PROCEDURE — 83880 ASSAY OF NATRIURETIC PEPTIDE: CPT

## 2021-03-03 PROCEDURE — 99214 OFFICE O/P EST MOD 30 MIN: CPT | Performed by: CLINICAL NURSE SPECIALIST

## 2021-03-03 PROCEDURE — 4004F PT TOBACCO SCREEN RCVD TLK: CPT | Performed by: CLINICAL NURSE SPECIALIST

## 2021-03-03 PROCEDURE — G8427 DOCREV CUR MEDS BY ELIG CLIN: HCPCS | Performed by: CLINICAL NURSE SPECIALIST

## 2021-03-03 PROCEDURE — G8484 FLU IMMUNIZE NO ADMIN: HCPCS | Performed by: CLINICAL NURSE SPECIALIST

## 2021-03-03 PROCEDURE — 80061 LIPID PANEL: CPT

## 2021-03-03 PROCEDURE — 3017F COLORECTAL CA SCREEN DOC REV: CPT | Performed by: CLINICAL NURSE SPECIALIST

## 2021-03-03 PROCEDURE — 80053 COMPREHEN METABOLIC PANEL: CPT

## 2021-03-03 PROCEDURE — G8420 CALC BMI NORM PARAMETERS: HCPCS | Performed by: CLINICAL NURSE SPECIALIST

## 2021-03-03 RX ORDER — CARVEDILOL 12.5 MG/1
12.5 TABLET ORAL 2 TIMES DAILY WITH MEALS
Qty: 60 TABLET | Refills: 2 | Status: SHIPPED | OUTPATIENT
Start: 2021-03-03 | End: 2021-03-30 | Stop reason: SDUPTHER

## 2021-03-03 RX ORDER — FUROSEMIDE 20 MG/1
20 TABLET ORAL DAILY
Qty: 45 TABLET | Refills: 1 | Status: SHIPPED | OUTPATIENT
Start: 2021-03-03 | End: 2021-03-30 | Stop reason: SDUPTHER

## 2021-03-03 NOTE — PATIENT INSTRUCTIONS
1.  Stop alcohol  2. Check blood work  3. Take 40 mg lasix for the next 5 days and then go back to 20 mg  4. Continue all current medications   5. refill sent on coreg and lasix to pharmacy  6.   RTO in 6 weeks

## 2021-03-03 NOTE — PROGRESS NOTES
[address]  Progress Note    Primary Care Doctor:  CHICHI Roy CNP    Chief Complaint   Patient presents with    Congestive Heart Failure    Shortness of Breath        History of Present Illness:  54 y.o. male with history of non compliance with medications, uncontrolled HTN, alcohol and smokes  2/24-27/2020 for acute sHF with LVEF of 15-20%, diuresed; uncontrolled HTN due to running out of insurance and stopping his medications; ABRAN on CKD    I had the pleasure of seeing Gary Fallon in follow up for sHF. He is ambulatory and by his self today. He continues to drink 2 beers every day. He states that he has been complaint with his medications but HR is 101 today and his coreg bottle that he has is from 12/20. He states he has had a little sob and continues to smoke a cigar once in awhile. He denies any edema, palpitations or lightheadedness. Not sure he is compliant with his medications. Past Medical History:   has a past medical history of Asthma. Surgical History:   has no past surgical history on file. Social History:   reports that he has been smoking cigars. He has never used smokeless tobacco. He reports current alcohol use of about 4.2 standard drinks of alcohol per week. He reports that he does not use drugs. Family History:   History reviewed. No pertinent family history. Home Medications:  Prior to Admission medications    Medication Sig Start Date End Date Taking?  Authorizing Provider   carvedilol (COREG) 12.5 MG tablet Take 1 tablet by mouth 2 times daily (with meals) 3/3/21  Yes CHICHI Valles   furosemide (LASIX) 20 MG tablet Take 1 tablet by mouth daily And an extra 20 mg with increased sob 3/3/21  Yes CHICHI Valles   atorvastatin (LIPITOR) 40 MG tablet Take 1 tablet by mouth daily 12/9/20  Yes CHICHI Valles   lisinopril (PRINIVIL;ZESTRIL) 5 MG tablet Take 1 tablet by mouth daily 12/9/20  Yes Carrie Cevallos APRN - CNS        Allergies:  Patient has no known allergies. Review of Systems:   · Constitutional: there has been no unanticipated weight loss. There's been no change in energy level, sleep pattern, or activity level. · Eyes: No visual changes or diplopia. No scleral icterus. · ENT: No Headaches, hearing loss or vertigo. No mouth sores or sore throat. · Cardiovascular: Reviewed in HPI  · Respiratory: No cough or wheezing, no sputum production. No hematemesis. · Gastrointestinal: No abdominal pain, appetite loss, blood in stools. No change in bowel or bladder habits. · Genitourinary: No dysuria, trouble voiding, or hematuria. · Musculoskeletal:  No gait disturbance, weakness or joint complaints. · Integumentary: No rash or pruritis. · Neurological: No headache, diplopia, change in muscle strength, numbness or tingling. No change in gait, balance, coordination, mood, affect, memory, mentation, behavior. · Psychiatric: No anxiety, no depression. · Endocrine: No malaise, fatigue or temperature intolerance. No excessive thirst, fluid intake, or urination. No tremor. · Hematologic/Lymphatic: No abnormal bruising or bleeding, blood clots or swollen lymph nodes. · Allergic/Immunologic: No nasal congestion or hives. Physical Examination:    Vitals:    03/03/21 0918   BP: 132/88   Pulse: 101   SpO2: 99%   Weight: 158 lb (71.7 kg)   Height: 5' 10\" (1.778 m)        Constitutional and General Appearance: Warm and dry, no apparent distress, normal coloration  HEENT:  Normocephalic, atraumatic  Respiratory:  · Normal excursion and expansion without use of accessory muscles  · Resp Auscultation: Normal breath sounds without dullness  Cardiovascular:  · The apical impulses not displaced  · Heart tones are crisp and normal  · JVP normal cm H2O  · Regular rate and rhythm  · Peripheral pulses are symmetrical and full  · There is no clubbing, cyanosis of the extremities.   · no edema  · Pedal Pulses: 2+ and equal   Abdomen:  · No masses or tenderness  · Liver/Spleen: No Abnormalities Noted  Neurological/Psychiatric:  · Alert and oriented in all spheres  · Moves all extremities well  · Exhibits normal gait balance and coordination  · No abnormalities of mood, affect, memory, mentation, or behavior are noted    Lab Data:    CBC:   Lab Results   Component Value Date    WBC 7.2 10/15/2020    WBC 7.1 2020    WBC 5.0 2018    RBC 4.75 10/15/2020    RBC 4.45 2020    RBC 4.49 2018    HGB 15.8 10/15/2020    HGB 14.6 2020    HGB 15.1 2018    HCT 46.2 10/15/2020    HCT 43.2 2020    HCT 44.5 2018    MCV 97.3 10/15/2020    MCV 97.1 2020    MCV 99.1 2018    RDW 15.1 10/15/2020    RDW 15.4 2020    RDW 15.1 2018     10/15/2020     2020     2018     BMP:  Lab Results   Component Value Date     10/15/2020     2020     2020    K 4.4 10/15/2020    K 4.8 2020    K 3.6 2020    K 4.3 2020     10/15/2020     2020     2020    CO2 27 10/15/2020    CO2 27 2020    CO2 24 2020    BUN 16 10/15/2020    BUN 20 2020    BUN 26 2020    CREATININE 1.2 10/15/2020    CREATININE 1.3 2020    CREATININE 1.2 2020     BNP:   Lab Results   Component Value Date    PROBNP 1,242 10/15/2020    PROBNP 973 2020    PROBNP 2,142 2020     Cardiac Imagin2020 Cardiac Cath : Dr Rosalio Phillips  Anatomy:   LM-Normal   LAD-Normal  Cx-Normal   OM- Normal   RCA-Dominant  RPDA- Normal      LVEDP- 10     Impression  ~Coronary Angiography w/ Normal Coronaries  ~Normal LVEDP  ~Non ischemic Cardiomyopathy    Echo 2020  Summary   Left ventricular cavity size is mild to moderately dilated. There is mild concentric left ventricular hypertrophy. Overall, left ventricular systolic function is severely depressed.    Ejection fraction is visually estimated to be 15-20%. (Jimenez's=19%)   Severe global hypokinesis. Grade II diastolic dysfunction with elevated LV filling pressures. Mild to moderate mitral regurgitation. The left atrium is mildly dilated. Mild to moderate tricuspid regurgitation with PASP of 25 mmHg    Assessment:    1. Chronic systolic heart failure (HCC) on ace, bb; hold on aldosterone antagonist due to compliance history   2. Essential hypertension    3. Noncompliance    4. Smoker    5. Alcohol abuse    6. HLD    Plan:   Patient Instructions   1. Stop alcohol  2. Check blood work  3. Take 40 mg lasix for the next 5 days and then go back to 20 mg  4. Continue all current medications   5. refill sent on coreg and lasix to pharmacy  6. RTO in 6 weeks      Consider changing lisinopril to entresto at next appt. Discussed ICD again with him    I appreciate the opportunity of cooperating in the care of this individual.    Tiesha Stark CNS, 3/3/2021, 12:45 PM    QUALITY MEASURES  1. Tobacco Cessation Counseling: Yes  2. Retake of BP if >140/90:   NA  3. Documentation to PCP/referring for new patient:  Sent to PCP at close of office visit  4. CAD patient on anti-platelet: NA  5. CAD patient on STATIN therapy:  Yes  6.  Patient with CHF and aFib on anticoagulation:  NA

## 2021-03-03 NOTE — TELEPHONE ENCOUNTER
----- Message from CHICHI Marquez sent at 3/3/2021  3:06 PM EST -----  Fluid level was increased on labs today  We discussed in OV today to increase lasix for 5 days  Continue plan we discussed  thanks

## 2021-03-24 ENCOUNTER — TELEPHONE (OUTPATIENT)
Dept: CARDIOLOGY CLINIC | Age: 56
End: 2021-03-24

## 2021-03-24 ENCOUNTER — OFFICE VISIT (OUTPATIENT)
Dept: CARDIOLOGY CLINIC | Age: 56
End: 2021-03-24
Payer: COMMERCIAL

## 2021-03-24 ENCOUNTER — HOSPITAL ENCOUNTER (OUTPATIENT)
Dept: ONCOLOGY | Age: 56
Setting detail: INFUSION SERIES
Discharge: HOME | End: 2021-03-24
Payer: COMMERCIAL

## 2021-03-24 VITALS
HEART RATE: 80 BPM | HEIGHT: 70 IN | SYSTOLIC BLOOD PRESSURE: 132 MMHG | DIASTOLIC BLOOD PRESSURE: 78 MMHG | OXYGEN SATURATION: 98 % | WEIGHT: 162.8 LBS | BODY MASS INDEX: 23.31 KG/M2

## 2021-03-24 VITALS
SYSTOLIC BLOOD PRESSURE: 142 MMHG | HEART RATE: 74 BPM | TEMPERATURE: 98 F | DIASTOLIC BLOOD PRESSURE: 94 MMHG | RESPIRATION RATE: 16 BRPM

## 2021-03-24 DIAGNOSIS — F10.10 ALCOHOL ABUSE: ICD-10-CM

## 2021-03-24 DIAGNOSIS — I50.22 CHRONIC SYSTOLIC CHF (CONGESTIVE HEART FAILURE) (HCC): Primary | ICD-10-CM

## 2021-03-24 DIAGNOSIS — I10 ESSENTIAL HYPERTENSION: ICD-10-CM

## 2021-03-24 DIAGNOSIS — E78.5 HYPERLIPIDEMIA, UNSPECIFIED HYPERLIPIDEMIA TYPE: ICD-10-CM

## 2021-03-24 DIAGNOSIS — Z91.199 NONCOMPLIANCE: ICD-10-CM

## 2021-03-24 DIAGNOSIS — F17.200 SMOKER: ICD-10-CM

## 2021-03-24 LAB
ANION GAP SERPL CALCULATED.3IONS-SCNC: 11 MMOL/L (ref 3–16)
BUN BLDV-MCNC: 18 MG/DL (ref 7–20)
CALCIUM SERPL-MCNC: 8.8 MG/DL (ref 8.3–10.6)
CHLORIDE BLD-SCNC: 105 MMOL/L (ref 99–110)
CO2: 24 MMOL/L (ref 21–32)
CREAT SERPL-MCNC: 1.1 MG/DL (ref 0.9–1.3)
GFR AFRICAN AMERICAN: >60
GFR NON-AFRICAN AMERICAN: >60
GLUCOSE BLD-MCNC: 142 MG/DL (ref 70–99)
HCT VFR BLD CALC: 42.5 % (ref 40.5–52.5)
HEMOGLOBIN: 14.2 G/DL (ref 13.5–17.5)
MCH RBC QN AUTO: 32.6 PG (ref 26–34)
MCHC RBC AUTO-ENTMCNC: 33.4 G/DL (ref 31–36)
MCV RBC AUTO: 97.7 FL (ref 80–100)
PDW BLD-RTO: 14.9 % (ref 12.4–15.4)
PLATELET # BLD: 103 K/UL (ref 135–450)
PLATELET SLIDE REVIEW: ABNORMAL
PMV BLD AUTO: 9.2 FL (ref 5–10.5)
POTASSIUM SERPL-SCNC: 4.3 MMOL/L (ref 3.5–5.1)
PRO-BNP: 4355 PG/ML (ref 0–124)
RBC # BLD: 4.35 M/UL (ref 4.2–5.9)
SLIDE REVIEW: ABNORMAL
SODIUM BLD-SCNC: 140 MMOL/L (ref 136–145)
WBC # BLD: 7.5 K/UL (ref 4–11)

## 2021-03-24 PROCEDURE — 2580000003 HC RX 258: Performed by: CLINICAL NURSE SPECIALIST

## 2021-03-24 PROCEDURE — G8427 DOCREV CUR MEDS BY ELIG CLIN: HCPCS | Performed by: CLINICAL NURSE SPECIALIST

## 2021-03-24 PROCEDURE — G8420 CALC BMI NORM PARAMETERS: HCPCS | Performed by: CLINICAL NURSE SPECIALIST

## 2021-03-24 PROCEDURE — 6360000002 HC RX W HCPCS: Performed by: CLINICAL NURSE SPECIALIST

## 2021-03-24 PROCEDURE — 83880 ASSAY OF NATRIURETIC PEPTIDE: CPT

## 2021-03-24 PROCEDURE — 4004F PT TOBACCO SCREEN RCVD TLK: CPT | Performed by: CLINICAL NURSE SPECIALIST

## 2021-03-24 PROCEDURE — 99211 OFF/OP EST MAY X REQ PHY/QHP: CPT

## 2021-03-24 PROCEDURE — 3017F COLORECTAL CA SCREEN DOC REV: CPT | Performed by: CLINICAL NURSE SPECIALIST

## 2021-03-24 PROCEDURE — 85027 COMPLETE CBC AUTOMATED: CPT

## 2021-03-24 PROCEDURE — 96374 THER/PROPH/DIAG INJ IV PUSH: CPT

## 2021-03-24 PROCEDURE — 93000 ELECTROCARDIOGRAM COMPLETE: CPT | Performed by: CLINICAL NURSE SPECIALIST

## 2021-03-24 PROCEDURE — G8484 FLU IMMUNIZE NO ADMIN: HCPCS | Performed by: CLINICAL NURSE SPECIALIST

## 2021-03-24 PROCEDURE — 99215 OFFICE O/P EST HI 40 MIN: CPT | Performed by: CLINICAL NURSE SPECIALIST

## 2021-03-24 PROCEDURE — 80048 BASIC METABOLIC PNL TOTAL CA: CPT

## 2021-03-24 RX ORDER — FUROSEMIDE 10 MG/ML
120 INJECTION INTRAMUSCULAR; INTRAVENOUS ONCE
Status: COMPLETED | OUTPATIENT
Start: 2021-03-24 | End: 2021-03-24

## 2021-03-24 RX ORDER — SODIUM CHLORIDE 0.9 % (FLUSH) 0.9 %
10 SYRINGE (ML) INJECTION ONCE
Status: COMPLETED | OUTPATIENT
Start: 2021-03-24 | End: 2021-03-24

## 2021-03-24 RX ADMIN — FUROSEMIDE 120 MG: 10 INJECTION, SOLUTION INTRAMUSCULAR; INTRAVENOUS at 12:08

## 2021-03-24 RX ADMIN — Medication 10 ML: at 12:05

## 2021-03-24 NOTE — PATIENT INSTRUCTIONS
1.  Will give IV lasix in infusion center along with blood work  2. Continue all current medications  3. Stop the booze  4.   Keep appt with me on 4/14/2021

## 2021-03-24 NOTE — TELEPHONE ENCOUNTER
Pt states he has had chest pain all night and has been sob. States NPRG told him to call and she could work him in to be seen.  Please call pt to advise

## 2021-03-24 NOTE — TELEPHONE ENCOUNTER
I spoke to patient and gave him the results of his blood work  He will cut his fluids down to 64 ounces a day and increase lasix to 40 mg for a week  He will call me on Monday to let me know how he is doing

## 2021-03-24 NOTE — LETTER
[address] [place] [address] Cardiology [place] [address] [address] [address]  Phone: [phone]  Fax: [phone] CHICHI Stratton        March 24, 2021     Patient: Yves Hernandez   YOB: 1965   Date of Visit: 3/24/2021       To Whom It May Concern: It is my medical opinion that Yves Hernandez be off work the rest of the week due to heart failure. If you have any questions or concerns, please don't hesitate to call.     Sincerely,        CHICHI Ojeda

## 2021-03-24 NOTE — PROGRESS NOTES
Patient to department for outpatient labs and lasix. Here from cardiology office. Labs drawn followed by lasix  120mg at 20 mg per minute. Tolerated well. Patient aware that he will have urinary urgency and frequency. Also aware that this tx may lower his b/p and he may feel dizzy upon standing. All questions answered. To follow up with cardiology office.

## 2021-03-24 NOTE — PROGRESS NOTES
[facility], [place], operated by [facility]  Progress Note    Primary Care Doctor:  CHICHI Francis - CNP    Chief Complaint   Patient presents with    Chest Pain    Congestive Heart Failure    Hypertension    Shortness of Breath        History of Present Illness:  54 y.o. male with history of non compliance with medications, uncontrolled HTN, alcohol and smokes  2/24-27/2020 for acute sHF with LVEF of 15-20%, diuresed; uncontrolled HTN due to running out of insurance and stopping his medications; ABRAN on CKD    I had the pleasure of seeing Alta Cadena in follow up for sHF and urgent visit for SOB and chest pain. He is ambulatory and by his self. He was intimate last night and became very SOB with activity. He is having chest pain in the middle of his chest with heavy arms. EKG done and reviewed with Dr Verena Ridley (no change), normal [facility] 2/27/2020. He took an extra 20 mg of lasix last night. His weight is up about 4 pounds. He continues to drink about 6 beers a week. He did have more over the weekend and this is when he started to feel bad. He has not missed any of his medications    Past Medical History:   has a past medical history of Asthma. Surgical History:   has no past surgical history on file. Social History:   reports that he has been smoking cigars. He has never used smokeless tobacco. He reports current alcohol use of about 4.2 standard drinks of alcohol per week. He reports that he does not use drugs. Family History:   History reviewed. No pertinent family history. Home Medications:  Prior to Admission medications    Medication Sig Start Date End Date Taking?  Authorizing Provider   carvedilol (COREG) 12.5 MG tablet Take 1 tablet by mouth 2 times daily (with meals) 3/3/21  Yes Mirna Garrett APRN - CNS   furosemide (LASIX) 20 MG tablet Take 1 tablet by mouth daily And an extra 20 mg with increased sob 3/3/21  Yes CHICHI Shell - CNS   atorvastatin (LIPITOR) 40 MG tablet Take 1 tablet by mouth daily 12/9/20  Yes CHICHI Lancaster - CNS   lisinopril (PRINIVIL;ZESTRIL) 5 MG tablet Take 1 tablet by mouth daily 12/9/20  Yes [facility] [address], [address] [facility]        Allergies:  Patient has no known allergies. Review of Systems:   · Constitutional: there has been no unanticipated weight loss. There's been no change in energy level, sleep pattern, or activity level. · Eyes: No visual changes or diplopia. No scleral icterus. · ENT: No Headaches, hearing loss or vertigo. No mouth sores or sore throat. · Cardiovascular: Reviewed in HPI  · Respiratory: No cough or wheezing, no sputum production. No hematemesis. · Gastrointestinal: No abdominal pain, appetite loss, blood in stools. No change in bowel or bladder habits. · Genitourinary: No dysuria, trouble voiding, or hematuria. · Musculoskeletal:  No gait disturbance, weakness or joint complaints. · Integumentary: No rash or pruritis. · Neurological: No headache, diplopia, change in muscle strength, numbness or tingling. No change in gait, balance, coordination, mood, affect, memory, mentation, behavior. · Psychiatric: No anxiety, no depression. · Endocrine: No malaise, fatigue or temperature intolerance. No excessive thirst, fluid intake, or urination. No tremor. · Hematologic/Lymphatic: No abnormal bruising or bleeding, blood clots or swollen lymph nodes. · Allergic/Immunologic: No nasal congestion or hives.     Physical Examination:    Vitals:    03/24/21 1055   BP: 132/78   Site: Right Upper Arm   Position: Sitting   Cuff Size: Medium Adult   Pulse: 80   SpO2: 98%   Weight: 162 lb 12.8 oz (73.8 kg)   Height: 5' 10\" (1.778 m)        Constitutional and General Appearance: Warm and dry, no apparent distress, normal coloration  HEENT:  Normocephalic, atraumatic  Respiratory:  · Normal excursion and expansion without use of accessory muscles  · Resp Auscultation: Normal breath sounds without dullness  Cardiovascular:  · The apical impulses not displaced  · Heart tones are crisp and normal  · JVP + cm H2O  · Regular rate and rhythm  · Peripheral pulses are symmetrical and full  · There is no clubbing, cyanosis of the extremities.   · no edema  · Pedal Pulses: 2+ and equal   Abdomen:  · No masses or tenderness  · Liver/Spleen: No Abnormalities Noted  Neurological/Psychiatric:  · Alert and oriented in all spheres  · Moves all extremities well  · Exhibits normal gait balance and coordination  · No abnormalities of mood, affect, memory, mentation, or behavior are noted    Lab Data:    CBC:   Lab Results   Component Value Date    WBC 7.2 10/15/2020    WBC 7.1 2020    WBC 5.0 2018    RBC 4.75 10/15/2020    RBC 4.45 2020    RBC 4.49 2018    HGB 15.8 10/15/2020    HGB 14.6 2020    HGB 15.1 2018    HCT 46.2 10/15/2020    HCT 43.2 2020    HCT 44.5 2018    MCV 97.3 10/15/2020    MCV 97.1 2020    MCV 99.1 2018    RDW 15.1 10/15/2020    RDW 15.4 2020    RDW 15.1 2018     10/15/2020     2020     2018     BMP:  Lab Results   Component Value Date     2021     10/15/2020     2020    K 4.5 2021    K 4.4 10/15/2020    K 4.8 2020    K 4.3 2020     2021     10/15/2020     2020    CO2 25 2021    CO2 27 10/15/2020    CO2 27 2020    BUN 12 2021    BUN 16 10/15/2020    BUN 20 2020    CREATININE 1.0 2021    CREATININE 1.2 10/15/2020    CREATININE 1.3 2020     BNP:   Lab Results   Component Value Date    PROBNP 1,806 2021    PROBNP 1,242 10/15/2020    PROBNP 973 2020     Cardiac Imagin2020 Cardiac Cath : Dr Aditi Mckeon  Anatomy:   LM-Normal   LAD-Normal  Cx-Normal   OM- Normal   RCA-Dominant  RPDA- Normal      LVEDP- 10     Impression  ~Coronary Angiography w/ Normal Coronaries  ~Normal LVEDP  ~Non ischemic Cardiomyopathy    Echo 2/25/2020  Summary   Left ventricular cavity size is mild to moderately dilated. There is mild concentric left ventricular hypertrophy. Overall, left ventricular systolic function is severely depressed. Ejection fraction is visually estimated to be 15-20%. (Jimenez's=19%)   Severe global hypokinesis. Grade II diastolic dysfunction with elevated LV filling pressures. Mild to moderate mitral regurgitation. The left atrium is mildly dilated. Mild to moderate tricuspid regurgitation with PASP of 25 mmHg    Assessment:    1. Chronic systolic heart failure (HCC) on ace, bb; hold on aldosterone antagonist due to compliance history   2. Essential hypertension    3. Noncompliance    4. Smoker    5. Alcohol abuse    6. HLD    Plan:   Patient Instructions   1. Will give IV lasix in infusion center along with blood work  2. Continue all current medications  3. Stop the booze  4. Keep appt with me on 4/14/2021    >40 minutes spent in reviewing, examining and coordinating with Infusion center plan of care and treatment. Consider changing lisinopril to entresto at next appt. Discussed ICD again with him    I appreciate the opportunity of cooperating in the care of this individual.    LUIS Villatoro, 3/24/2021, 11:58 AM    QUALITY MEASURES  1. Tobacco Cessation Counseling: Yes  2. Retake of BP if >140/90:   NA  3. Documentation to PCP/referring for new patient:  Sent to PCP at close of office visit  4. CAD patient on anti-platelet: NA  5. CAD patient on STATIN therapy:  Yes  6.  Patient with CHF and aFib on anticoagulation:  NA

## 2021-03-30 RX ORDER — FUROSEMIDE 20 MG/1
20 TABLET ORAL DAILY
Qty: 45 TABLET | Refills: 1 | Status: SHIPPED | OUTPATIENT
Start: 2021-03-30 | End: 2021-05-26 | Stop reason: SDUPTHER

## 2021-03-30 RX ORDER — LISINOPRIL 5 MG/1
5 TABLET ORAL DAILY
Qty: 60 TABLET | Refills: 1 | Status: SHIPPED | OUTPATIENT
Start: 2021-03-30 | End: 2021-04-14 | Stop reason: ALTCHOICE

## 2021-03-30 RX ORDER — CARVEDILOL 12.5 MG/1
12.5 TABLET ORAL 2 TIMES DAILY WITH MEALS
Qty: 60 TABLET | Refills: 2 | Status: SHIPPED | OUTPATIENT
Start: 2021-03-30 | End: 2021-05-26 | Stop reason: SDUPTHER

## 2021-03-30 RX ORDER — ATORVASTATIN CALCIUM 40 MG/1
40 TABLET, FILM COATED ORAL DAILY
Qty: 30 TABLET | Refills: 1 | Status: SHIPPED | OUTPATIENT
Start: 2021-03-30 | End: 2021-05-26 | Stop reason: SDUPTHER

## 2021-03-31 ENCOUNTER — TELEPHONE (OUTPATIENT)
Dept: CARDIOLOGY CLINIC | Age: 56
End: 2021-03-31

## 2021-03-31 NOTE — TELEPHONE ENCOUNTER
Eliza Co calling from pt registration needs a diagnostic code for labs put in on 03/24 pls call to advise thank you

## 2021-04-14 ENCOUNTER — OFFICE VISIT (OUTPATIENT)
Dept: CARDIOLOGY CLINIC | Age: 56
End: 2021-04-14
Payer: COMMERCIAL

## 2021-04-14 VITALS
BODY MASS INDEX: 22.05 KG/M2 | WEIGHT: 154 LBS | DIASTOLIC BLOOD PRESSURE: 80 MMHG | HEART RATE: 81 BPM | OXYGEN SATURATION: 95 % | HEIGHT: 70 IN | SYSTOLIC BLOOD PRESSURE: 112 MMHG

## 2021-04-14 DIAGNOSIS — E78.5 HYPERLIPIDEMIA, UNSPECIFIED HYPERLIPIDEMIA TYPE: ICD-10-CM

## 2021-04-14 DIAGNOSIS — F10.10 ALCOHOL ABUSE: ICD-10-CM

## 2021-04-14 DIAGNOSIS — Z91.199 NONCOMPLIANCE: ICD-10-CM

## 2021-04-14 DIAGNOSIS — F17.200 SMOKER: ICD-10-CM

## 2021-04-14 DIAGNOSIS — I50.22 CHRONIC SYSTOLIC CHF (CONGESTIVE HEART FAILURE) (HCC): Primary | ICD-10-CM

## 2021-04-14 DIAGNOSIS — I10 ESSENTIAL HYPERTENSION: ICD-10-CM

## 2021-04-14 PROCEDURE — 4004F PT TOBACCO SCREEN RCVD TLK: CPT | Performed by: CLINICAL NURSE SPECIALIST

## 2021-04-14 PROCEDURE — 3017F COLORECTAL CA SCREEN DOC REV: CPT | Performed by: CLINICAL NURSE SPECIALIST

## 2021-04-14 PROCEDURE — G8420 CALC BMI NORM PARAMETERS: HCPCS | Performed by: CLINICAL NURSE SPECIALIST

## 2021-04-14 PROCEDURE — 99214 OFFICE O/P EST MOD 30 MIN: CPT | Performed by: CLINICAL NURSE SPECIALIST

## 2021-04-14 PROCEDURE — G8427 DOCREV CUR MEDS BY ELIG CLIN: HCPCS | Performed by: CLINICAL NURSE SPECIALIST

## 2021-04-14 RX ORDER — SACUBITRIL AND VALSARTAN 24; 26 MG/1; MG/1
0.5 TABLET, FILM COATED ORAL 2 TIMES DAILY
Qty: 30 TABLET | Refills: 1 | Status: SHIPPED | OUTPATIENT
Start: 2021-04-14 | End: 2021-05-26 | Stop reason: SDUPTHER

## 2021-04-14 NOTE — PATIENT INSTRUCTIONS
1.  Stop lisinopril  2. On Friday, start entresto half of a 24-26 mg pill twice a day  3. Check blood work in 2 weeks  4. RTO in 4-6 weeks  5.   Continue all the rest of the medications

## 2021-04-14 NOTE — PROGRESS NOTES
[facility]  Progress Note    Primary Care Doctor:  Sylvia Schwab, APRN - CNP    Chief Complaint   Patient presents with    Congestive Heart Failure        History of Present Illness:  54 y.o. male with history of non compliance with medications, uncontrolled HTN, alcohol and smokes  2/24-27/2020 for acute sHF with LVEF of 15-20%, diuresed; uncontrolled HTN due to running out of insurance and stopping his medications; ABRAN on CKD    I had the pleasure of seeing Chris Singh in follow up for sHF. He is ambulatory and by his self. After last visit, he received IV lasix and weight has gone from 162->154. He feels better. He admits that he has chest pressure if he over does the fluids. He has cut out all the booze and only has drank 1 beer since I saw him last.  He has been more compliant with his appointments and understands the severity of his illness. Past Medical History:   has a past medical history of Asthma. Surgical History:   has no past surgical history on file. Social History:   reports that he has been smoking cigars. He has never used smokeless tobacco. He reports current alcohol use of about 4.2 standard drinks of alcohol per week. He reports that he does not use drugs. Family History:   History reviewed. No pertinent family history. Home Medications:  Prior to Admission medications    Medication Sig Start Date End Date Taking?  Authorizing Provider   sacubitril-valsartan (ENTRESTO) 24-26 MG per tablet Take 0.5 tablets by mouth 2 times daily 4/14/21  Yes Mirna Woodard APRN - CNS   carvedilol (COREG) 12.5 MG tablet Take 1 tablet by mouth 2 times daily (with meals) 3/30/21  Yes CHICHI Posada - CNS   furosemide (LASIX) 20 MG tablet Take 1 tablet by mouth daily And an extra 20 mg with increased sob 3/30/21  Yes CHICHI Posada   atorvastatin (LIPITOR) 40 MG tablet Take 1 tablet by mouth daily 3/30/21  Yes [facility] [address], [address] , APRN - CNS Allergies:  Patient has no known allergies. Review of Systems:   · Constitutional: there has been no unanticipated weight loss. There's been no change in energy level, sleep pattern, or activity level. · Eyes: No visual changes or diplopia. No scleral icterus. · ENT: No Headaches, hearing loss or vertigo. No mouth sores or sore throat. · Cardiovascular: Reviewed in HPI  · Respiratory: No cough or wheezing, no sputum production. No hematemesis. · Gastrointestinal: No abdominal pain, appetite loss, blood in stools. No change in bowel or bladder habits. · Genitourinary: No dysuria, trouble voiding, or hematuria. · Musculoskeletal:  No gait disturbance, weakness or joint complaints. · Integumentary: No rash or pruritis. · Neurological: No headache, diplopia, change in muscle strength, numbness or tingling. No change in gait, balance, coordination, mood, affect, memory, mentation, behavior. · Psychiatric: No anxiety, no depression. · Endocrine: No malaise, fatigue or temperature intolerance. No excessive thirst, fluid intake, or urination. No tremor. · Hematologic/Lymphatic: No abnormal bruising or bleeding, blood clots or swollen lymph nodes. · Allergic/Immunologic: No nasal congestion or hives. Physical Examination:    Vitals:    04/14/21 0906   BP: 112/80   Pulse: 81   SpO2: 95%   Weight: 154 lb (69.9 kg)   Height: 5' 10\" (1.778 m)        Constitutional and General Appearance: Warm and dry, no apparent distress, normal coloration  HEENT:  Normocephalic, atraumatic  Respiratory:  · Normal excursion and expansion without use of accessory muscles  · Resp Auscultation: Normal breath sounds without dullness  Cardiovascular:  · The apical impulses not displaced  · Heart tones are crisp and normal  · JVP normal cm H2O  · Regular rate and rhythm  · Peripheral pulses are symmetrical and full  · There is no clubbing, cyanosis of the extremities.   · no edema  · Pedal Pulses: 2+ and equal Abdomen:  · No masses or tenderness  · Liver/Spleen: No Abnormalities Noted  Neurological/Psychiatric:  · Alert and oriented in all spheres  · Moves all extremities well  · Exhibits normal gait balance and coordination  · No abnormalities of mood, affect, memory, mentation, or behavior are noted    Lab Data:    CBC:   Lab Results   Component Value Date    WBC 7.5 2021    WBC 7.2 10/15/2020    WBC 7.1 2020    RBC 4.35 2021    RBC 4.75 10/15/2020    RBC 4.45 2020    HGB 14.2 2021    HGB 15.8 10/15/2020    HGB 14.6 2020    HCT 42.5 2021    HCT 46.2 10/15/2020    HCT 43.2 2020    MCV 97.7 2021    MCV 97.3 10/15/2020    MCV 97.1 2020    RDW 14.9 2021    RDW 15.1 10/15/2020    RDW 15.4 2020     2021     10/15/2020     2020     BMP:  Lab Results   Component Value Date     2021     2021     10/15/2020    K 4.3 2021    K 4.5 2021    K 4.4 10/15/2020    K 4.3 2020     2021     2021     10/15/2020    CO2 24 2021    CO2 25 2021    CO2 27 10/15/2020    BUN 18 2021    BUN 12 2021    BUN 16 10/15/2020    CREATININE 1.1 2021    CREATININE 1.0 2021    CREATININE 1.2 10/15/2020     BNP:   Lab Results   Component Value Date    PROBNP 4,355 2021    PROBNP 1,806 2021    PROBNP 1,242 10/15/2020     Cardiac Imagin2020 Cardiac Cath : Dr Desirae Avila  Anatomy:   LM-Normal   LAD-Normal  Cx-Normal   OM- Normal   RCA-Dominant  RPDA- Normal      LVEDP- 10     Impression  ~Coronary Angiography w/ Normal Coronaries  ~Normal LVEDP  ~Non ischemic Cardiomyopathy    Echo 2020  Summary   Left ventricular cavity size is mild to moderately dilated. There is mild concentric left ventricular hypertrophy. Overall, left ventricular systolic function is severely depressed.    Ejection fraction is visually estimated to

## 2021-05-25 ENCOUNTER — TELEPHONE (OUTPATIENT)
Dept: CARDIOLOGY CLINIC | Age: 56
End: 2021-05-25

## 2021-05-25 NOTE — TELEPHONE ENCOUNTER
Pt calling wants to talk to [facility]. He wanted to move his appt from Fri to tomorrow, I imelda him to 1:00pm 05/26, however he still insisting NPRG call him.  Pls call to advise Thank you

## 2021-05-26 ENCOUNTER — OFFICE VISIT (OUTPATIENT)
Dept: CARDIOLOGY CLINIC | Age: 56
End: 2021-05-26
Payer: COMMERCIAL

## 2021-05-26 VITALS
WEIGHT: 152.3 LBS | SYSTOLIC BLOOD PRESSURE: 138 MMHG | OXYGEN SATURATION: 98 % | HEART RATE: 77 BPM | HEIGHT: 70 IN | DIASTOLIC BLOOD PRESSURE: 86 MMHG | BODY MASS INDEX: 21.8 KG/M2

## 2021-05-26 DIAGNOSIS — E78.5 HYPERLIPIDEMIA, UNSPECIFIED HYPERLIPIDEMIA TYPE: ICD-10-CM

## 2021-05-26 DIAGNOSIS — F17.200 SMOKER: ICD-10-CM

## 2021-05-26 DIAGNOSIS — F10.10 ALCOHOL ABUSE: ICD-10-CM

## 2021-05-26 DIAGNOSIS — I10 ESSENTIAL HYPERTENSION: ICD-10-CM

## 2021-05-26 DIAGNOSIS — I50.22 CHRONIC SYSTOLIC CHF (CONGESTIVE HEART FAILURE) (HCC): Primary | ICD-10-CM

## 2021-05-26 DIAGNOSIS — Z91.199 NONCOMPLIANCE: ICD-10-CM

## 2021-05-26 PROCEDURE — G8420 CALC BMI NORM PARAMETERS: HCPCS | Performed by: CLINICAL NURSE SPECIALIST

## 2021-05-26 PROCEDURE — G8427 DOCREV CUR MEDS BY ELIG CLIN: HCPCS | Performed by: CLINICAL NURSE SPECIALIST

## 2021-05-26 PROCEDURE — 3017F COLORECTAL CA SCREEN DOC REV: CPT | Performed by: CLINICAL NURSE SPECIALIST

## 2021-05-26 PROCEDURE — 4004F PT TOBACCO SCREEN RCVD TLK: CPT | Performed by: CLINICAL NURSE SPECIALIST

## 2021-05-26 PROCEDURE — 99214 OFFICE O/P EST MOD 30 MIN: CPT | Performed by: CLINICAL NURSE SPECIALIST

## 2021-05-26 RX ORDER — SACUBITRIL AND VALSARTAN 24; 26 MG/1; MG/1
0.5 TABLET, FILM COATED ORAL 2 TIMES DAILY
Qty: 30 TABLET | Refills: 2 | Status: SHIPPED | OUTPATIENT
Start: 2021-05-26 | End: 2021-07-30 | Stop reason: SDUPTHER

## 2021-05-26 RX ORDER — FUROSEMIDE 20 MG/1
20 TABLET ORAL DAILY
Qty: 45 TABLET | Refills: 2 | Status: SHIPPED | OUTPATIENT
Start: 2021-05-26 | End: 2021-07-30 | Stop reason: SDUPTHER

## 2021-05-26 RX ORDER — ATORVASTATIN CALCIUM 40 MG/1
40 TABLET, FILM COATED ORAL DAILY
Qty: 30 TABLET | Refills: 2 | Status: SHIPPED | OUTPATIENT
Start: 2021-05-26 | End: 2021-07-30 | Stop reason: SDUPTHER

## 2021-05-26 RX ORDER — CARVEDILOL 12.5 MG/1
12.5 TABLET ORAL 2 TIMES DAILY WITH MEALS
Qty: 60 TABLET | Refills: 2 | Status: SHIPPED | OUTPATIENT
Start: 2021-05-26 | End: 2021-07-30 | Stop reason: SDUPTHER

## 2021-05-26 NOTE — PROGRESS NOTES
[address]  Progress Note    Primary Care Doctor:  CHICHI Vaughn CNP    Chief Complaint   Patient presents with    Congestive Heart Failure        History of Present Illness:  54 y.o. male with history of non compliance with medications, uncontrolled HTN, alcohol and smokes  2/24-27/2020 for acute sHF with LVEF of 15-20%, diuresed; uncontrolled HTN due to running out of insurance and stopping his medications; ABRAN on CKD    I had the pleasure of seeing Jade Bobo in follow up for sHF. He is ambulatory and by his self. His weight is stable. He has been out of his medications for a couple days. He has noticed improvement since starting entresto. He continues to drink a few beers on the weekend. He denies any shortness of breath, palpitations, lightheadedness or chest pain. Past Medical History:   has a past medical history of Asthma. Surgical History:   has no past surgical history on file. Social History:   reports that he has been smoking cigars. He has never used smokeless tobacco. He reports current alcohol use of about 4.2 standard drinks of alcohol per week. He reports that he does not use drugs. Family History:   History reviewed. No pertinent family history. Home Medications:  Prior to Admission medications    Medication Sig Start Date End Date Taking?  Authorizing Provider   sacubitril-valsartan (ENTRESTO) 24-26 MG per tablet Take 0.5 tablets by mouth 2 times daily 4/14/21  Yes CHICHI Moreno   furosemide (LASIX) 20 MG tablet Take 1 tablet by mouth daily And an extra 20 mg with increased sob 3/30/21  Yes CHICHI Shell   carvedilol (COREG) 12.5 MG tablet Take 1 tablet by mouth 2 times daily (with meals)  Patient not taking: Reported on 5/26/2021 3/30/21   CHICHI Rojas   atorvastatin (LIPITOR) 40 MG tablet Take 1 tablet by mouth daily  Patient not taking: Reported on 5/26/2021 3/30/21   CHICHI Rojas Abdomen:  · No masses or tenderness  · Liver/Spleen: No Abnormalities Noted  Neurological/Psychiatric:  · Alert and oriented in all spheres  · Moves all extremities well  · Exhibits normal gait balance and coordination  · No abnormalities of mood, affect, memory, mentation, or behavior are noted    Lab Data:    CBC:   Lab Results   Component Value Date    WBC 7.5 2021    WBC 7.2 10/15/2020    WBC 7.1 2020    RBC 4.35 2021    RBC 4.75 10/15/2020    RBC 4.45 2020    HGB 14.2 2021    HGB 15.8 10/15/2020    HGB 14.6 2020    HCT 42.5 2021    HCT 46.2 10/15/2020    HCT 43.2 2020    MCV 97.7 2021    MCV 97.3 10/15/2020    MCV 97.1 2020    RDW 14.9 2021    RDW 15.1 10/15/2020    RDW 15.4 2020     2021     10/15/2020     2020     BMP:  Lab Results   Component Value Date     2021     2021     10/15/2020    K 4.3 2021    K 4.5 2021    K 4.4 10/15/2020    K 4.3 2020     2021     2021     10/15/2020    CO2 24 2021    CO2 25 2021    CO2 27 10/15/2020    BUN 18 2021    BUN 12 2021    BUN 16 10/15/2020    CREATININE 1.1 2021    CREATININE 1.0 2021    CREATININE 1.2 10/15/2020     BNP:   Lab Results   Component Value Date    PROBNP 4,355 2021    PROBNP 1,806 2021    PROBNP 1,242 10/15/2020     Cardiac Imagin2020 Cardiac Cath : Dr Vishal Baird  Anatomy:   LM-Normal   LAD-Normal  Cx-Normal   OM- Normal   RCA-Dominant  RPDA- Normal      LVEDP- 10     Impression  ~Coronary Angiography w/ Normal Coronaries  ~Normal LVEDP  ~Non ischemic Cardiomyopathy    Echo 2020  Summary   Left ventricular cavity size is mild to moderately dilated. There is mild concentric left ventricular hypertrophy. Overall, left ventricular systolic function is severely depressed.    Ejection fraction is visually estimated to be 15-20%. (Jimenez's=19%)   Severe global hypokinesis. Grade II diastolic dysfunction with elevated LV filling pressures. Mild to moderate mitral regurgitation. The left atrium is mildly dilated. Mild to moderate tricuspid regurgitation with PASP of 25 mmHg    Assessment:    1. Chronic systolic heart failure (HCC) on ace, bb; hold on aldosterone antagonist due to compliance history   2. Essential hypertension    3. Noncompliance    4. Smoker    5. Alcohol abuse    6. HLD    Plan:   1. Refilled scripts   2. Continue all current medications  3. Check echo in 3 months with appt    I appreciate the opportunity of cooperating in the care of this individual.    CHICHI Cortés - CNS, CNS, 5/26/2021, 1:11 PM    QUALITY MEASURES  1. Tobacco Cessation Counseling: Yes  2. Retake of BP if >140/90:   NA  3. Documentation to PCP/referring for new patient:  Sent to PCP at close of office visit  4. CAD patient on anti-platelet: NA  5. CAD patient on STATIN therapy:  Yes  6.  Patient with CHF and aFib on anticoagulation:  NA

## 2021-06-10 ENCOUNTER — TELEPHONE (OUTPATIENT)
Dept: CARDIOLOGY CLINIC | Age: 56
End: 2021-06-10

## 2021-07-30 ENCOUNTER — TELEPHONE (OUTPATIENT)
Dept: CARDIOLOGY CLINIC | Age: 56
End: 2021-07-30

## 2021-07-30 RX ORDER — SACUBITRIL AND VALSARTAN 24; 26 MG/1; MG/1
0.5 TABLET, FILM COATED ORAL 2 TIMES DAILY
Qty: 30 TABLET | Refills: 2 | Status: CANCELLED | OUTPATIENT
Start: 2021-07-30

## 2021-07-30 RX ORDER — FUROSEMIDE 20 MG/1
20 TABLET ORAL DAILY
Qty: 45 TABLET | Refills: 1 | Status: SHIPPED | OUTPATIENT
Start: 2021-07-30 | End: 2021-09-09 | Stop reason: SDUPTHER

## 2021-07-30 RX ORDER — ATORVASTATIN CALCIUM 40 MG/1
40 TABLET, FILM COATED ORAL DAILY
Qty: 30 TABLET | Refills: 2 | Status: CANCELLED | OUTPATIENT
Start: 2021-07-30

## 2021-07-30 RX ORDER — FUROSEMIDE 20 MG/1
20 TABLET ORAL DAILY
Qty: 45 TABLET | Refills: 2 | Status: CANCELLED | OUTPATIENT
Start: 2021-07-30

## 2021-07-30 RX ORDER — CARVEDILOL 12.5 MG/1
12.5 TABLET ORAL 2 TIMES DAILY WITH MEALS
Qty: 60 TABLET | Refills: 1 | Status: SHIPPED | OUTPATIENT
Start: 2021-07-30 | End: 2021-09-09 | Stop reason: SDUPTHER

## 2021-07-30 RX ORDER — SACUBITRIL AND VALSARTAN 24; 26 MG/1; MG/1
0.5 TABLET, FILM COATED ORAL 2 TIMES DAILY
Qty: 30 TABLET | Refills: 1 | Status: SHIPPED | OUTPATIENT
Start: 2021-07-30 | End: 2021-09-09 | Stop reason: SDUPTHER

## 2021-07-30 RX ORDER — CARVEDILOL 12.5 MG/1
12.5 TABLET ORAL 2 TIMES DAILY WITH MEALS
Qty: 60 TABLET | Refills: 2 | Status: CANCELLED | OUTPATIENT
Start: 2021-07-30

## 2021-07-30 RX ORDER — ATORVASTATIN CALCIUM 40 MG/1
40 TABLET, FILM COATED ORAL DAILY
Qty: 30 TABLET | Refills: 1 | Status: SHIPPED | OUTPATIENT
Start: 2021-07-30 | End: 2021-09-09 | Stop reason: SDUPTHER

## 2021-09-07 ENCOUNTER — TELEPHONE (OUTPATIENT)
Dept: CARDIOLOGY CLINIC | Age: 56
End: 2021-09-07

## 2021-09-07 NOTE — TELEPHONE ENCOUNTER
Pt called on answering service needs refills on medications (didn't say which ones) Pls call to advise thank you

## 2021-09-07 NOTE — TELEPHONE ENCOUNTER
Left message on machine for patient to return call to office. Needing to know what medication he is wanting refilled.

## 2021-09-09 RX ORDER — SACUBITRIL AND VALSARTAN 24; 26 MG/1; MG/1
0.5 TABLET, FILM COATED ORAL 2 TIMES DAILY
Qty: 30 TABLET | Refills: 0 | Status: SHIPPED | OUTPATIENT
Start: 2021-09-09 | End: 2021-09-14

## 2021-09-09 RX ORDER — ATORVASTATIN CALCIUM 40 MG/1
40 TABLET, FILM COATED ORAL DAILY
Qty: 30 TABLET | Refills: 0 | Status: SHIPPED | OUTPATIENT
Start: 2021-09-09 | End: 2021-10-20 | Stop reason: SDUPTHER

## 2021-09-09 RX ORDER — CARVEDILOL 12.5 MG/1
12.5 TABLET ORAL 2 TIMES DAILY WITH MEALS
Qty: 60 TABLET | Refills: 0 | Status: SHIPPED | OUTPATIENT
Start: 2021-09-09 | End: 2021-10-21 | Stop reason: DRUGHIGH

## 2021-09-09 RX ORDER — FUROSEMIDE 20 MG/1
20 TABLET ORAL DAILY
Qty: 45 TABLET | Refills: 0 | Status: SHIPPED | OUTPATIENT
Start: 2021-09-09 | End: 2021-10-20 | Stop reason: SDUPTHER

## 2021-09-09 NOTE — TELEPHONE ENCOUNTER
Med Refill   Has appt 9/14, will be out of med's before then.    Pt wants called once the RX is called in so his mom and pick them up     sacubitril-valsartan (ENTRESTO) 24-26 MG per tablet   0.5 tablet  Quantity: 30 tablet  Take 0.5 tablets by mouth 2 times daily  Pt has VOUCHER for this please let pharmacy know     furosemide (LASIX) 20 MG tablet  20 mg  45 tablet  Take 1 tablet by mouth daily And an extra 20 mg with increased sob    carvedilol (COREG) 12.5 MG tablet   12.5 mg  60 tablet  Take 1 tablet by mouth 2 times daily (with meals)    atorvastatin (LIPITOR) 40 MG tablet   40 mg  30 tablet  Take 1 tablet by mouth daily    [address] [phone]   [address] [place] [id number]   Phone:  [phone]  Fax:  [phone]

## 2021-09-09 NOTE — TELEPHONE ENCOUNTER
Received refill request for Entresto, atorvastatin, furosemide, carvedilol from [facility] pharmacy pharmacy.     Last ov:5/26/2021 NPRG    Last labs:bmp, bnp 5/26/2021, 3/3/2021 lipid    Last EKG:3/24/2021    Last Refill:7/30/2021 #30 with 1 refill entresto, atorvastatin, #45 with 1 refill furosemide #45 with 1 refill, #60 with 1 refill carvedilol    Next appointment:9/14/2021 NPRG

## 2021-09-14 ENCOUNTER — HOSPITAL ENCOUNTER (OUTPATIENT)
Dept: NON INVASIVE DIAGNOSTICS | Age: 56
Discharge: HOME | End: 2021-09-14
Payer: COMMERCIAL

## 2021-09-14 ENCOUNTER — TELEPHONE (OUTPATIENT)
Dept: CARDIOLOGY CLINIC | Age: 56
End: 2021-09-14

## 2021-09-14 ENCOUNTER — OFFICE VISIT (OUTPATIENT)
Dept: CARDIOLOGY CLINIC | Age: 56
End: 2021-09-14
Payer: COMMERCIAL

## 2021-09-14 ENCOUNTER — HOSPITAL ENCOUNTER (OUTPATIENT)
Age: 56
Discharge: HOME | End: 2021-09-14
Payer: COMMERCIAL

## 2021-09-14 VITALS
DIASTOLIC BLOOD PRESSURE: 80 MMHG | HEIGHT: 70 IN | BODY MASS INDEX: 22.19 KG/M2 | WEIGHT: 155 LBS | HEART RATE: 58 BPM | OXYGEN SATURATION: 99 % | SYSTOLIC BLOOD PRESSURE: 126 MMHG

## 2021-09-14 DIAGNOSIS — I50.22 CHRONIC SYSTOLIC CHF (CONGESTIVE HEART FAILURE) (HCC): ICD-10-CM

## 2021-09-14 DIAGNOSIS — Z91.199 NONCOMPLIANCE: ICD-10-CM

## 2021-09-14 DIAGNOSIS — F10.10 ALCOHOL ABUSE: ICD-10-CM

## 2021-09-14 DIAGNOSIS — I50.22 CHRONIC SYSTOLIC CHF (CONGESTIVE HEART FAILURE) (HCC): Primary | ICD-10-CM

## 2021-09-14 DIAGNOSIS — E78.5 HYPERLIPIDEMIA, UNSPECIFIED HYPERLIPIDEMIA TYPE: ICD-10-CM

## 2021-09-14 DIAGNOSIS — I10 ESSENTIAL HYPERTENSION: ICD-10-CM

## 2021-09-14 DIAGNOSIS — F17.200 SMOKER: ICD-10-CM

## 2021-09-14 LAB
ANION GAP SERPL CALCULATED.3IONS-SCNC: 9 MMOL/L (ref 3–16)
BUN BLDV-MCNC: 16 MG/DL (ref 7–20)
CALCIUM SERPL-MCNC: 9.2 MG/DL (ref 8.3–10.6)
CHLORIDE BLD-SCNC: 105 MMOL/L (ref 99–110)
CO2: 27 MMOL/L (ref 21–32)
CREAT SERPL-MCNC: 1.2 MG/DL (ref 0.9–1.3)
GFR AFRICAN AMERICAN: >60
GFR NON-AFRICAN AMERICAN: >60
GLUCOSE BLD-MCNC: 108 MG/DL (ref 70–99)
POTASSIUM SERPL-SCNC: 5 MMOL/L (ref 3.5–5.1)
PRO-BNP: 2982 PG/ML (ref 0–124)
SODIUM BLD-SCNC: 141 MMOL/L (ref 136–145)

## 2021-09-14 PROCEDURE — 36415 COLL VENOUS BLD VENIPUNCTURE: CPT

## 2021-09-14 PROCEDURE — 99214 OFFICE O/P EST MOD 30 MIN: CPT | Performed by: CLINICAL NURSE SPECIALIST

## 2021-09-14 PROCEDURE — 3017F COLORECTAL CA SCREEN DOC REV: CPT | Performed by: CLINICAL NURSE SPECIALIST

## 2021-09-14 PROCEDURE — 93308 TTE F-UP OR LMTD: CPT

## 2021-09-14 PROCEDURE — 83880 ASSAY OF NATRIURETIC PEPTIDE: CPT

## 2021-09-14 PROCEDURE — G8427 DOCREV CUR MEDS BY ELIG CLIN: HCPCS | Performed by: CLINICAL NURSE SPECIALIST

## 2021-09-14 PROCEDURE — 80048 BASIC METABOLIC PNL TOTAL CA: CPT

## 2021-09-14 PROCEDURE — 4004F PT TOBACCO SCREEN RCVD TLK: CPT | Performed by: CLINICAL NURSE SPECIALIST

## 2021-09-14 PROCEDURE — G8420 CALC BMI NORM PARAMETERS: HCPCS | Performed by: CLINICAL NURSE SPECIALIST

## 2021-09-14 RX ORDER — SACUBITRIL AND VALSARTAN 24; 26 MG/1; MG/1
1 TABLET, FILM COATED ORAL 2 TIMES DAILY
Qty: 60 TABLET | Refills: 1 | Status: SHIPPED | OUTPATIENT
Start: 2021-09-14 | End: 2021-10-21 | Stop reason: SDUPTHER

## 2021-09-14 NOTE — TELEPHONE ENCOUNTER
Patient can not make an appt for at least 4 weeks . Could he be worked in with someone in mid-October ?

## 2021-09-14 NOTE — PATIENT INSTRUCTIONS
1.  Stop smoking cigars and continue off alcohol  2. Increase entresto 24-26 mg twice a day   3. Check blood work today  4. Cardiac rehab (PLEASE do this)  5. RTO in 1 month  6.   Referral to EP (electrophysiology) to discuss defibrillator

## 2021-09-14 NOTE — PROGRESS NOTES
[address]  Progress Note    Primary Care Doctor:  CHICHI Aguirre - JUANITO    Chief Complaint   Patient presents with    Follow-up     ECHO    Congestive Heart Failure    Edema     took extra lasix to help with swelling     Shortness of Breath     took extra lasix to help with fluid         History of Present Illness:  54 y.o. male with history of non compliance with medications, uncontrolled HTN, alcohol and smokes  2/24-27/2020 for acute sHF with LVEF of 15-20%, diuresed; uncontrolled HTN due to running out of insurance and stopping his medications; ABRAN on CKD  entresto 4/14/2021    I had the pleasure of seeing Kiana Sampson in follow up for sHF. He is ambulatory and by his self. His echo preliminary shows 15-20%. He had some swelling in his ankles a couple weeks ago and took some extra lasix which resolved the swelling issue. He states he has stopped drinking all alcohol but still smokes cigars. He states he is compliant with his medications. He denies any chest pain, lightheadedness or palpitations. Past Medical History:   has a past medical history of Asthma. Surgical History:   has no past surgical history on file. Social History:   reports that he has been smoking cigars. He has never used smokeless tobacco. He reports current alcohol use of about 4.2 standard drinks of alcohol per week. He reports that he does not use drugs. Family History:   History reviewed. No pertinent family history. Home Medications:  Prior to Admission medications    Medication Sig Start Date End Date Taking?  Authorizing Provider   sacubitril-valsartan (ENTRESTO) 24-26 MG per tablet Take 0.5 tablets by mouth 2 times daily 9/9/21  Yes Mirna Salas APRN - CNS   atorvastatin (LIPITOR) 40 MG tablet Take 1 tablet by mouth daily 9/9/21  Yes Mirna Salas APRN - CNS   carvedilol (COREG) 12.5 MG tablet Take 1 tablet by mouth 2 times daily (with meals) 9/9/21  Yes [facility] [id number], [address] , CHICHI - CNS furosemide (LASIX) 20 MG tablet Take 1 tablet by mouth daily And an extra 20 mg with increased sob 9/9/21  Yes Mirna Garnica, APRN - CNS        Allergies:  Patient has no known allergies. Review of Systems:   · Constitutional: there has been no unanticipated weight loss. There's been no change in energy level, sleep pattern, or activity level. · Eyes: No visual changes or diplopia. No scleral icterus. · ENT: No Headaches, hearing loss or vertigo. No mouth sores or sore throat. · Cardiovascular: Reviewed in HPI  · Respiratory: No cough or wheezing, no sputum production. No hematemesis. · Gastrointestinal: No abdominal pain, appetite loss, blood in stools. No change in bowel or bladder habits. · Genitourinary: No dysuria, trouble voiding, or hematuria. · Musculoskeletal:  No gait disturbance, weakness or joint complaints. · Integumentary: No rash or pruritis. · Neurological: No headache, diplopia, change in muscle strength, numbness or tingling. No change in gait, balance, coordination, mood, affect, memory, mentation, behavior. · Psychiatric: No anxiety, no depression. · Endocrine: No malaise, fatigue or temperature intolerance. No excessive thirst, fluid intake, or urination. No tremor. · Hematologic/Lymphatic: No abnormal bruising or bleeding, blood clots or swollen lymph nodes. · Allergic/Immunologic: No nasal congestion or hives.     Physical Examination:    Vitals:    09/14/21 1123   BP: 126/80   Site: Left Upper Arm   Position: Sitting   Cuff Size: Medium Adult   Pulse: 58   SpO2: 99%   Weight: 155 lb (70.3 kg)   Height: 5' 10\" (1.778 m)        Constitutional and General Appearance: Warm and dry, no apparent distress, normal coloration  HEENT:  Normocephalic, atraumatic  Respiratory:  · Normal excursion and expansion without use of accessory muscles  · Resp Auscultation: Normal breath sounds without dullness  Cardiovascular:  · The apical impulses not displaced  · Heart tones are crisp and normal  · JVP normal cm H2O  · Regular rate and rhythm  · Peripheral pulses are symmetrical and full  · There is no clubbing, cyanosis of the extremities.   · no edema  · Pedal Pulses: 2+ and equal   Abdomen:  · No masses or tenderness  · Liver/Spleen: No Abnormalities Noted  Neurological/Psychiatric:  · Alert and oriented in all spheres  · Moves all extremities well  · Exhibits normal gait balance and coordination  · No abnormalities of mood, affect, memory, mentation, or behavior are noted    Lab Data:    CBC:   Lab Results   Component Value Date    WBC 7.5 03/24/2021    WBC 7.2 10/15/2020    WBC 7.1 02/25/2020    RBC 4.35 03/24/2021    RBC 4.75 10/15/2020    RBC 4.45 02/25/2020    HGB 14.2 03/24/2021    HGB 15.8 10/15/2020    HGB 14.6 02/25/2020    HCT 42.5 03/24/2021    HCT 46.2 10/15/2020    HCT 43.2 02/25/2020    MCV 97.7 03/24/2021    MCV 97.3 10/15/2020    MCV 97.1 02/25/2020    RDW 14.9 03/24/2021    RDW 15.1 10/15/2020    RDW 15.4 02/25/2020     03/24/2021     10/15/2020     02/25/2020     BMP:  Lab Results   Component Value Date     03/24/2021     03/03/2021     10/15/2020    K 4.3 03/24/2021    K 4.5 03/03/2021    K 4.4 10/15/2020    K 4.3 02/24/2020     03/24/2021     03/03/2021     10/15/2020    CO2 24 03/24/2021    CO2 25 03/03/2021    CO2 27 10/15/2020    BUN 18 03/24/2021    BUN 12 03/03/2021    BUN 16 10/15/2020    CREATININE 1.1 03/24/2021    CREATININE 1.0 03/03/2021    CREATININE 1.2 10/15/2020     BNP:   Lab Results   Component Value Date    PROBNP 4,355 03/24/2021    PROBNP 1,806 03/03/2021    PROBNP 1,242 10/15/2020     Cardiac Imaging:  Echo 9/14/2021 pending    2/27/2020 Cardiac Cath : Dr Ubaldo Erazo  Anatomy:   LM-Normal   LAD-Normal  Cx-Normal   OM- Normal   RCA-Dominant  RPDA- Normal      LVEDP- 10     Impression  ~Coronary Angiography w/ Normal Coronaries  ~Normal LVEDP  ~Non ischemic Cardiomyopathy    Echo 2/25/2020  Summary   Left ventricular cavity size is mild to moderately dilated. There is mild concentric left ventricular hypertrophy. Overall, left ventricular systolic function is severely depressed. Ejection fraction is visually estimated to be 15-20%. (Jimenez's=19%)   Severe global hypokinesis. Grade II diastolic dysfunction with elevated LV filling pressures. Mild to moderate mitral regurgitation. The left atrium is mildly dilated. Mild to moderate tricuspid regurgitation with PASP of 25 mmHg    Assessment:    1. Chronic systolic heart failure (HCC) on arni, bb; hold on aldosterone antagonist due to compliance history   2. Essential hypertension    3. Noncompliance    4. Smoker    5. Alcohol abuse stopped   6. HLD    Plan:   1. Stop smoking cigars and continue off alcohol  2. Increase entresto 24-26 mg twice a day   3. Check blood work today  4. Cardiac rehab (PLEASE do this)  5. RTO in 1 month  6. Referral to EP (electrophysiology) to discuss defibrillator. Long discussion regarding ICD today    I appreciate the opportunity of cooperating in the care of this individual.    Yue Faust, APRN - CNS, CNS, 9/14/2021, 11:39 AM    QUALITY MEASURES  1. Tobacco Cessation Counseling: Yes  2. Retake of BP if >140/90:   NA  3. Documentation to PCP/referring for new patient:  Sent to PCP at close of office visit  4. CAD patient on anti-platelet: NA  5. CAD patient on STATIN therapy:  Yes  6.  Patient with CHF and aFib on anticoagulation:  NA

## 2021-09-15 ENCOUNTER — TELEPHONE (OUTPATIENT)
Dept: CARDIOLOGY CLINIC | Age: 56
End: 2021-09-15

## 2021-09-15 NOTE — TELEPHONE ENCOUNTER
Tried to reach patient, [facility] about lab results. Spoke to patient he verbalized understanding.

## 2021-09-15 NOTE — TELEPHONE ENCOUNTER
----- Message from CHICHI Rubalcava - CNS sent at 9/15/2021  9:28 AM EDT -----  Fluid level and labs are great  Continue what we discussed in OV yesterday  thanks

## 2021-10-20 NOTE — PROGRESS NOTES
[address]   Electrophysiology Consultation   Date: 10/21/2021  Reason for Consultation: ICD    Consult Requesting Physician:Mirna CADET, JUANITO   Chief Complaint   Patient presents with    New Patient       CC:  ICD  Consult /Cardiomyopathy  HPI: Dong Oliver is a 54 y.o. male history of non compliance with medications, Asthma,  uncontrolled HTN, HLD Systolic heart failure,  alcohol and smokes. Hospitalized 2/24-27/2020 for acute sHF with LVEF of 15-20%, diuresed; uncontrolled HTN due to running out of insurance and stopping his medications; ABRAN on CKD. entresto 4/14/2021     Vale Gore presents today for a consult regarding his heart failure and possible defibrillator placement. Assessment and plan:   Chronic Systolic Heart failure and cardiomyopathy   - LVEF 02/25/2020 15-20%   - LVEF  09/14/2021 15-20%    - Started Zayas-Burch 04/14/2021. On Coreg 12.5 mg BID for over a year. Has been on guideline directed medical therapy for at least 90 days. - not on aldactone due to non-compliance with medication   - Lasix 20 mg daily    - Cath 02/2020 - normal coronaries    - Follows with Heart Failure     - shared decision making tool given to patient    -  The risks, benefits and alternatives of the procedure were discussed with the patient. The risks including, but not limited to, the risks of bleeding, infection, pain, device malfunction, lead dislodgement, radiation exposure, injury to cardiac and surrounding structures (including pneumothorax), stroke, cardiac perforation, tamponade, need for emergent heart surgery, myocardial infarction and death were discussed in detail. Dual vs single chamber device, including risks and benefits were discussed with patient. Printed information about ablation including risks were given. Patient was encouraged to review information given, and call back with any questions about risks, benefits and alternative of procedure.     The patient opted to  Consider the information before he decides to proceed. .     We will increase the carvedilol to 25 mg twice daily  He has severely reduced LV function and class II congestive heart failure. He is a candidate for primary prevention of sudden cardiac death. He is on medical therapy. We discussed the risk-benefit alternatives. Shared decision-making was discussed including [place] shared decision-making scheme. He will think about it and will let us know if he decides to proceed with it. HTN  -Controlled border line 137/88   -BP goal <130/80  -Home BP monitoring encouraged, printed information provided on how to accurately measure BP at home.  -Counseled to follow a low salt diet to assure blood pressure remains controlled for cardiovascular risk factor modification.   -The patient is counseled to get regular exercise 3-5 times per week and maintain a healthy weight reduce cardiovascular risk factors. - Continue Coreg, Lasix,Entresto   - Follows with Dr. Ilan Mcbride    - On Lipitor   - Lipids 03/03/2021 TC 89, HDL 55, LDL 24, TG 49    - Follows with heart failure. Plan:   When patient calls to proceed we will schedule him with the first available EP physician   I would like to increase coreg to 25 mg BID. We discussed the purpose of increasing heart failure meds   Follow up in one year with EPNP     Patient Active Problem List    Diagnosis Date Noted    Chronic systolic CHF (congestive heart failure) ([facility] [address] 06/25/2020    Essential hypertension 06/25/2020    Smoker 06/25/2020    Noncompliance 60/62/6248    Acute systolic CHF (congestive heart failure), NYHA class 3 ([address] 02/27/2020    Heart failure, unspecified ([address] 02/24/2020     Diagnostic studies:    EKG today: Sinus Rhythm with frequent PVCs   Echo 09/14/2021    Summary   -Left ventricular cavity size is severely dilated with normal left   ventricular wall thickness.   -Overall left ventricular systolic function appears severely reduced.    Ejection fraction is visually estimated to be 15-20%. There is severe   diffuse hypokinesis. (Jimenez's EF: 18%, Heart model EF: 19%). -Global longitudinal strain: -5% (abnormal). -The right ventricle is moderately enlarged. Right ventricular systolic   function is mild-moderately reduced. Echo 02/25/2020     Summary   Left ventricular cavity size is mild to moderately dilated. There is mild concentric left ventricular hypertrophy. Overall, left ventricular systolic function is severely depressed. Ejection fraction is visually estimated to be 15-20%. (Jimenez's=19%)   Severe global hypokinesis. Grade II diastolic dysfunction with elevated LV filling pressures. Mild to moderate mitral regurgitation. The left atrium is mildly dilated. Mild to moderate tricuspid regurgitation with PASP of 25 mmHg. [facility] 02/27/2020   Impression  ~Coronary Angiography w/ Normal Coronaries  ~Normal LVEDP  ~Non ischemic Cardiomyopathy             I independently reviewed the cardiac diagnostic studies, ECG and relevant imaging studies. Lab Results   Component Value Date    LVEF 18 02/25/2020     Lab Results   Component Value Date    TSH 2.39 02/24/2020       Physical Examination:  Vitals:    10/21/21 1521   BP: 137/88   Pulse: 84   SpO2: 98%      Wt Readings from Last 3 Encounters:   10/21/21 157 lb 12.8 oz (71.6 kg)   09/14/21 155 lb (70.3 kg)   05/26/21 152 lb 4.8 oz (69.1 kg)       · Constitutional: Oriented. No distress. · Head: Normocephalic and atraumatic. · Mouth/Throat: Oropharynx is clear and moist.   · Eyes: Conjunctivae normal. EOM are normal.   · Neck: Neck supple. No rigidity. No JVD present. · Cardiovascular: Normal rate, regular rhythm, S1&S2. · Pulmonary/Chest: Bilateral respiratory sounds. No wheezes, No rhonchi. · Abdominal: Soft. Bowel sounds present. No distension, No tenderness. · Musculoskeletal: No tenderness. No edema    · Lymphadenopathy: Has no cervical adenopathy.    · Neurological: Alert and oriented. Cranial nerve appears intact, No Gross deficit   · Skin: Skin is warm and dry. No rash noted. · Psychiatric: Has a normal behavior       Review of System:  [x] Full ROS obtained and negative except as mentioned in HPI    Prior to Admission medications    Medication Sig Start Date End Date Taking? Authorizing Provider   atorvastatin (LIPITOR) 40 MG tablet Take 1 tablet by mouth daily 10/21/21  Yes Jade Sor, APRN - CNP   furosemide (LASIX) 20 MG tablet Take 1 tablet by mouth daily And an extra 20 mg with increased sob 10/21/21  Yes Jade Sor, APRN - CNP   sacubitril-valsartan (ENTRESTO) 24-26 MG per tablet Take 1 tablet by mouth 2 times daily 9/14/21  Yes CHICHI Winston - CNS   carvedilol (COREG) 12.5 MG tablet Take 1 tablet by mouth 2 times daily (with meals) 9/9/21  Yes CHICHI Lazar - LUIS       Past Medical History:   Diagnosis Date    Asthma         History reviewed. No pertinent surgical history. No Known Allergies    Social History:  Reviewed. reports that he has been smoking cigars. He has never used smokeless tobacco. He reports current alcohol use of about 4.2 standard drinks of alcohol per week. He reports that he does not use drugs. Family History:  Reviewed. Reviewed. No family history of SCD. Relevant and available labs, and cardiovascular diagnostics reviewed. Reviewed. I independently reviewed all cardiac tracing. I independently reviewed relevant and available cardiac diagnostic tests ECG, CXR, Echo, Stress test, Device interrogation, Holter, CT scan. Outside medical records via Care everywhere reviewed and summarized in H&P above.     Complex medical condition with multiple medical problems affecting prognosis and outcome of EP interventions       - The patient is counseled to follow a low salt diet to assure blood pressure remains controlled for cardiovascular risk factor modification.   - The patient is counseled to avoid excess caffeine, and energy drinks as this may exacerbated ectopy and arrhythmia. - The patient is counseled to get regular exercise 3-5 times per week to control cardiovascular risk factors. All questions and concerns were addressed to the patient/family. Alternatives to my treatment were discussed. I have discussed the above stated plan and the patient verbalized understanding and agreed with the plan. Scribe attestation: This note was scribed in the presence of Eugenia Figueroa MD by Ray Fierro RN    I, Dr. Eugenia Figueroa personally performed the services described in this documentation as scribed by RN in my presence, and it is both accurate and complete.      NOTE: This report was transcribed using voice recognition software. Every effort was made to ensure accuracy, however, inadvertent computerized transcription errors may be present.      Eugenia Figueroa MD, [facility], [address] [place] [address]   Office: [phone]  Fax: [phone]

## 2021-10-21 ENCOUNTER — OFFICE VISIT (OUTPATIENT)
Dept: CARDIOLOGY CLINIC | Age: 56
End: 2021-10-21
Payer: COMMERCIAL

## 2021-10-21 VITALS
HEIGHT: 70 IN | WEIGHT: 157.8 LBS | OXYGEN SATURATION: 98 % | SYSTOLIC BLOOD PRESSURE: 137 MMHG | HEART RATE: 84 BPM | DIASTOLIC BLOOD PRESSURE: 88 MMHG | BODY MASS INDEX: 22.59 KG/M2

## 2021-10-21 DIAGNOSIS — I50.22 CHRONIC SYSTOLIC CHF (CONGESTIVE HEART FAILURE) (HCC): ICD-10-CM

## 2021-10-21 DIAGNOSIS — E78.5 HYPERLIPIDEMIA, UNSPECIFIED HYPERLIPIDEMIA TYPE: ICD-10-CM

## 2021-10-21 DIAGNOSIS — I10 ESSENTIAL HYPERTENSION: ICD-10-CM

## 2021-10-21 DIAGNOSIS — I42.0 DILATED CARDIOMYOPATHY (HCC): Primary | ICD-10-CM

## 2021-10-21 PROCEDURE — G8427 DOCREV CUR MEDS BY ELIG CLIN: HCPCS | Performed by: INTERNAL MEDICINE

## 2021-10-21 PROCEDURE — G8420 CALC BMI NORM PARAMETERS: HCPCS | Performed by: INTERNAL MEDICINE

## 2021-10-21 PROCEDURE — 3017F COLORECTAL CA SCREEN DOC REV: CPT | Performed by: INTERNAL MEDICINE

## 2021-10-21 PROCEDURE — G8484 FLU IMMUNIZE NO ADMIN: HCPCS | Performed by: INTERNAL MEDICINE

## 2021-10-21 PROCEDURE — 93000 ELECTROCARDIOGRAM COMPLETE: CPT | Performed by: INTERNAL MEDICINE

## 2021-10-21 PROCEDURE — 99215 OFFICE O/P EST HI 40 MIN: CPT | Performed by: INTERNAL MEDICINE

## 2021-10-21 PROCEDURE — 4004F PT TOBACCO SCREEN RCVD TLK: CPT | Performed by: INTERNAL MEDICINE

## 2021-10-21 RX ORDER — CARVEDILOL 12.5 MG/1
12.5 TABLET ORAL 2 TIMES DAILY WITH MEALS
Qty: 60 TABLET | Refills: 0 | Status: CANCELLED | OUTPATIENT
Start: 2021-10-21

## 2021-10-21 RX ORDER — SACUBITRIL AND VALSARTAN 24; 26 MG/1; MG/1
1 TABLET, FILM COATED ORAL 2 TIMES DAILY
Qty: 60 TABLET | Refills: 1 | Status: CANCELLED | OUTPATIENT
Start: 2021-10-21

## 2021-10-21 RX ORDER — SACUBITRIL AND VALSARTAN 24; 26 MG/1; MG/1
1 TABLET, FILM COATED ORAL 2 TIMES DAILY
Qty: 60 TABLET | Refills: 1 | Status: SHIPPED | OUTPATIENT
Start: 2021-10-21 | End: 2022-01-17 | Stop reason: SDUPTHER

## 2021-10-21 RX ORDER — CARVEDILOL 25 MG/1
25 TABLET ORAL 2 TIMES DAILY
Qty: 180 TABLET | Refills: 3 | Status: SHIPPED | OUTPATIENT
Start: 2021-10-21 | End: 2021-10-21 | Stop reason: SDUPTHER

## 2021-10-21 RX ORDER — CARVEDILOL 25 MG/1
25 TABLET ORAL 2 TIMES DAILY
Qty: 180 TABLET | Refills: 3 | Status: SHIPPED | OUTPATIENT
Start: 2021-10-21 | End: 2022-05-05 | Stop reason: SDUPTHER

## 2021-11-19 ENCOUNTER — TELEPHONE (OUTPATIENT)
Dept: CARDIOLOGY CLINIC | Age: 56
End: 2021-11-19

## 2021-11-19 NOTE — TELEPHONE ENCOUNTER
Pt called wanting to speak to [facility] pt stated it was a personnel matter, when ask if it was about medications pt stated no it just something he needs to talk to her about.     Pls advise thank you

## 2021-11-24 NOTE — TELEPHONE ENCOUNTER
Please see and advise. STEPHANI Deal Np Fp Nurse Msg [place]  Cc: Joseline Renee NP  I received a call from Rajani at the [facility]. They received an order for CT abdomen/pelvis for this patient, but the referral only has CT abdomen. Could I please received an updated referral so I begin working on the STAT request?   Thank you!   Meena WOODSON

## 2021-12-02 ENCOUNTER — HOSPITAL ENCOUNTER (OUTPATIENT)
Age: 56
Discharge: HOME | End: 2021-12-02
Payer: COMMERCIAL

## 2021-12-02 ENCOUNTER — OFFICE VISIT (OUTPATIENT)
Dept: CARDIOLOGY CLINIC | Age: 56
End: 2021-12-02
Payer: COMMERCIAL

## 2021-12-02 VITALS
OXYGEN SATURATION: 98 % | BODY MASS INDEX: 23.34 KG/M2 | DIASTOLIC BLOOD PRESSURE: 78 MMHG | HEIGHT: 70 IN | SYSTOLIC BLOOD PRESSURE: 112 MMHG | HEART RATE: 78 BPM | WEIGHT: 163 LBS

## 2021-12-02 DIAGNOSIS — I50.22 CHRONIC SYSTOLIC CHF (CONGESTIVE HEART FAILURE) (HCC): Primary | ICD-10-CM

## 2021-12-02 DIAGNOSIS — Z91.199 NONCOMPLIANCE: ICD-10-CM

## 2021-12-02 DIAGNOSIS — E78.5 HYPERLIPIDEMIA, UNSPECIFIED HYPERLIPIDEMIA TYPE: ICD-10-CM

## 2021-12-02 DIAGNOSIS — F10.10 ALCOHOL ABUSE: ICD-10-CM

## 2021-12-02 DIAGNOSIS — E55.9 VITAMIN D DEFICIENCY: ICD-10-CM

## 2021-12-02 DIAGNOSIS — I10 ESSENTIAL HYPERTENSION: ICD-10-CM

## 2021-12-02 DIAGNOSIS — F17.200 SMOKER: ICD-10-CM

## 2021-12-02 DIAGNOSIS — I50.22 CHRONIC SYSTOLIC CHF (CONGESTIVE HEART FAILURE) (HCC): ICD-10-CM

## 2021-12-02 LAB
ANION GAP SERPL CALCULATED.3IONS-SCNC: 13 MMOL/L (ref 3–16)
BUN BLDV-MCNC: 19 MG/DL (ref 7–20)
CALCIUM SERPL-MCNC: 8.7 MG/DL (ref 8.3–10.6)
CHLORIDE BLD-SCNC: 107 MMOL/L (ref 99–110)
CO2: 24 MMOL/L (ref 21–32)
CREAT SERPL-MCNC: 1.2 MG/DL (ref 0.9–1.3)
GFR AFRICAN AMERICAN: >60
GFR NON-AFRICAN AMERICAN: >60
GLUCOSE BLD-MCNC: 120 MG/DL (ref 70–99)
POTASSIUM SERPL-SCNC: 4.8 MMOL/L (ref 3.5–5.1)
PRO-BNP: 4540 PG/ML (ref 0–124)
SODIUM BLD-SCNC: 144 MMOL/L (ref 136–145)
VITAMIN D 25-HYDROXY: 7.9 NG/ML

## 2021-12-02 PROCEDURE — 80048 BASIC METABOLIC PNL TOTAL CA: CPT

## 2021-12-02 PROCEDURE — G8420 CALC BMI NORM PARAMETERS: HCPCS | Performed by: CLINICAL NURSE SPECIALIST

## 2021-12-02 PROCEDURE — 4004F PT TOBACCO SCREEN RCVD TLK: CPT | Performed by: CLINICAL NURSE SPECIALIST

## 2021-12-02 PROCEDURE — 82306 VITAMIN D 25 HYDROXY: CPT

## 2021-12-02 PROCEDURE — G8427 DOCREV CUR MEDS BY ELIG CLIN: HCPCS | Performed by: CLINICAL NURSE SPECIALIST

## 2021-12-02 PROCEDURE — 83880 ASSAY OF NATRIURETIC PEPTIDE: CPT

## 2021-12-02 PROCEDURE — 3017F COLORECTAL CA SCREEN DOC REV: CPT | Performed by: CLINICAL NURSE SPECIALIST

## 2021-12-02 PROCEDURE — 36415 COLL VENOUS BLD VENIPUNCTURE: CPT

## 2021-12-02 PROCEDURE — 99214 OFFICE O/P EST MOD 30 MIN: CPT | Performed by: CLINICAL NURSE SPECIALIST

## 2021-12-02 PROCEDURE — G8484 FLU IMMUNIZE NO ADMIN: HCPCS | Performed by: CLINICAL NURSE SPECIALIST

## 2021-12-02 RX ORDER — ERGOCALCIFEROL 1.25 MG/1
50000 CAPSULE ORAL WEEKLY
Qty: 12 CAPSULE | Refills: 1 | Status: SHIPPED | OUTPATIENT
Start: 2021-12-02 | End: 2022-06-27 | Stop reason: SDUPTHER

## 2021-12-02 RX ORDER — FUROSEMIDE 20 MG/1
20 TABLET ORAL DAILY
Qty: 45 TABLET | Refills: 1 | Status: SHIPPED | OUTPATIENT
Start: 2021-12-02 | End: 2022-01-17 | Stop reason: SDUPTHER

## 2021-12-02 RX ORDER — ATORVASTATIN CALCIUM 40 MG/1
40 TABLET, FILM COATED ORAL DAILY
Qty: 30 TABLET | Refills: 1 | Status: SHIPPED | OUTPATIENT
Start: 2021-12-02 | End: 2022-01-17 | Stop reason: SDUPTHER

## 2021-12-02 NOTE — PROGRESS NOTES
[facility]  Progress Note    Primary Care Doctor:  CHICHI Lane CNP    Chief Complaint   Patient presents with    Congestive Heart Failure    Hypertension    Fatigue    Shortness of Breath        History of Present Illness:  64 y.o. male with history of non compliance with medications, uncontrolled HTN, alcohol and smokes  2/24-27/2020 for acute sHF with LVEF of 15-20%, diuresed; uncontrolled HTN due to running out of insurance and stopping his medications; ABRAN on CKD  entresto 4/14/2021    I had the pleasure of seeing Lynn Bertrand in follow up for sHF. He is ambulatory and by his self. His weight has gone from 157-163. He has taken an extra lasix a couple times which helps. He saw Dr Leela Isaac and is thinking about ICD in Jan.  He denies any chest pain, palpitations, lightheadedness or edema. Biggest issue is weak. He continues to drink alcohol and ate more over the holidays    Past Medical History:   has a past medical history of Asthma. Surgical History:   has no past surgical history on file. Social History:   reports that he has been smoking cigars. He has never used smokeless tobacco. He reports current alcohol use of about 4.2 standard drinks of alcohol per week. He reports that he does not use drugs. Family History:   History reviewed. No pertinent family history. Home Medications:  Prior to Admission medications    Medication Sig Start Date End Date Taking?  Authorizing Provider   atorvastatin (LIPITOR) 40 MG tablet Take 1 tablet by mouth daily 10/21/21  Yes CHICHI Carias CNP   furosemide (LASIX) 20 MG tablet Take 1 tablet by mouth daily And an extra 20 mg with increased sob 10/21/21  Yes CHICHI Carias CNP   sacubitril-valsartan (ENTRESTO) 24-26 MG per tablet Take 1 tablet by mouth 2 times daily 10/21/21  Yes CHICHI Carias CNP   carvedilol (COREG) 25 MG tablet Take 1 tablet by mouth 2 times daily 10/21/21  Yes CHICHI Carias CNP Allergies:  Patient has no known allergies. Review of Systems:   · Constitutional: there has been no unanticipated weight loss. There's been no change in energy level, sleep pattern, or activity level. · Eyes: No visual changes or diplopia. No scleral icterus. · ENT: No Headaches, hearing loss or vertigo. No mouth sores or sore throat. · Cardiovascular: Reviewed in HPI  · Respiratory: No cough or wheezing, no sputum production. No hematemesis. · Gastrointestinal: No abdominal pain, appetite loss, blood in stools. No change in bowel or bladder habits. · Genitourinary: No dysuria, trouble voiding, or hematuria. · Musculoskeletal:  No gait disturbance, weakness or joint complaints. · Integumentary: No rash or pruritis. · Neurological: No headache, diplopia, change in muscle strength, numbness or tingling. No change in gait, balance, coordination, mood, affect, memory, mentation, behavior. · Psychiatric: No anxiety, no depression. · Endocrine: No malaise, fatigue or temperature intolerance. No excessive thirst, fluid intake, or urination. No tremor. · Hematologic/Lymphatic: No abnormal bruising or bleeding, blood clots or swollen lymph nodes. · Allergic/Immunologic: No nasal congestion or hives. Physical Examination:    Vitals:    12/02/21 1141   BP: 112/78   Pulse: 78   SpO2: 98%   Weight: 163 lb (73.9 kg)   Height: 5' 10\" (1.778 m)        Constitutional and General Appearance: Warm and dry, no apparent distress, normal coloration  HEENT:  Normocephalic, atraumatic  Respiratory:  · Normal excursion and expansion without use of accessory muscles  · Resp Auscultation: Normal breath sounds without dullness  Cardiovascular:  · The apical impulses not displaced  · Heart tones are crisp and normal  · JVP normal cm H2O  · Regular rate and rhythm  · Peripheral pulses are symmetrical and full  · There is no clubbing, cyanosis of the extremities.   · no edema  · Pedal Pulses: 2+ and equal Abdomen:  · No masses or tenderness  · Liver/Spleen: No Abnormalities Noted  Neurological/Psychiatric:  · Alert and oriented in all spheres  · Moves all extremities well  · Exhibits normal gait balance and coordination  · No abnormalities of mood, affect, memory, mentation, or behavior are noted    Lab Data:    CBC:   Lab Results   Component Value Date    WBC 7.5 03/24/2021    WBC 7.2 10/15/2020    WBC 7.1 02/25/2020    RBC 4.35 03/24/2021    RBC 4.75 10/15/2020    RBC 4.45 02/25/2020    HGB 14.2 03/24/2021    HGB 15.8 10/15/2020    HGB 14.6 02/25/2020    HCT 42.5 03/24/2021    HCT 46.2 10/15/2020    HCT 43.2 02/25/2020    MCV 97.7 03/24/2021    MCV 97.3 10/15/2020    MCV 97.1 02/25/2020    RDW 14.9 03/24/2021    RDW 15.1 10/15/2020    RDW 15.4 02/25/2020     03/24/2021     10/15/2020     02/25/2020     BMP:  Lab Results   Component Value Date     09/14/2021     03/24/2021     03/03/2021    K 5.0 09/14/2021    K 4.3 03/24/2021    K 4.5 03/03/2021    K 4.3 02/24/2020     09/14/2021     03/24/2021     03/03/2021    CO2 27 09/14/2021    CO2 24 03/24/2021    CO2 25 03/03/2021    BUN 16 09/14/2021    BUN 18 03/24/2021    BUN 12 03/03/2021    CREATININE 1.2 09/14/2021    CREATININE 1.1 03/24/2021    CREATININE 1.0 03/03/2021     BNP:   Lab Results   Component Value Date    PROBNP 2,982 09/14/2021    PROBNP 4,355 03/24/2021    PROBNP 1,806 03/03/2021     Cardiac Imaging:  Echo 9/14/2021   Summary   -Left ventricular cavity size is severely dilated with normal left   ventricular wall thickness.   -Overall left ventricular systolic function appears severely reduced. Ejection fraction is visually estimated to be 15-20%. There is severe   diffuse hypokinesis. (Jimenez's EF: 18%, Heart model EF: 19%). -Global longitudinal strain: -5% (abnormal). -The right ventricle is moderately enlarged.  Right ventricular systolic   function is mild-moderately reduced. 2/27/2020 Cardiac Cath : Dr Lana Patterson  Anatomy:   LM-Normal   LAD-Normal  Cx-Normal   OM- Normal   RCA-Dominant  RPDA- Normal      LVEDP- 10     Impression  ~Coronary Angiography w/ Normal Coronaries  ~Normal LVEDP  ~Non ischemic Cardiomyopathy    Echo 2/25/2020  Summary   Left ventricular cavity size is mild to moderately dilated. There is mild concentric left ventricular hypertrophy. Overall, left ventricular systolic function is severely depressed. Ejection fraction is visually estimated to be 15-20%. (Jimenez's=19%)   Severe global hypokinesis. Grade II diastolic dysfunction with elevated LV filling pressures. Mild to moderate mitral regurgitation. The left atrium is mildly dilated. Mild to moderate tricuspid regurgitation with PASP of 25 mmHg    Assessment:    1. Chronic systolic heart failure (HCC) on arni, bb; hold on aldosterone antagonist due to compliance history   2. Essential hypertension    3. Noncompliance    4. Smoker    5. Alcohol abuse   6. Hyperlipidemia  7. Vitamin d deficiency    Plan:   1. Take an extra 20 mg of lasix for the next 2 days   2. Start farxiga 10 mg daily on Saturday  3. Continue all other medications  4. Check blood work today and then in 2 weeks  5. RTO in January  6. Stop smoking  7. Do cardiac rehab    Can consider digoxin in follow up visit and aldactone    I appreciate the opportunity of cooperating in the care of this individual.    CHICHI Weber - CNS, CNS, 12/2/2021, 11:45 AM    QUALITY MEASURES  1. Tobacco Cessation Counseling: Yes  2. Retake of BP if >140/90:   NA  3. Documentation to PCP/referring for new patient:  Sent to PCP at close of office visit  4. CAD patient on anti-platelet: NA  5. CAD patient on STATIN therapy:  Yes  6.  Patient with CHF and aFib on anticoagulation:  NA

## 2021-12-02 NOTE — PATIENT INSTRUCTIONS
1.  Take an extra 20 mg of lasix for the next 2 days   2. Start farxiga 10 mg daily on Saturday  3. Continue all other medications  4. Check blood work today and then in 2 weeks  5. RTO in January  6. Stop smoking  7.   Do cardiac rehab

## 2021-12-06 ENCOUNTER — TELEPHONE (OUTPATIENT)
Dept: CARDIOLOGY CLINIC | Age: 56
End: 2021-12-06

## 2021-12-06 NOTE — TELEPHONE ENCOUNTER
----- Message from CHICHI Larsen - CNS sent at 12/2/2021  9:16 PM EST -----  Vitamin d is very low  He needs to start vitamin d 00050 once a week, script sent to his pharmacy  Also his fluid level was up and I asked him to increase his lasix for a couple days and to start 101 Dates , make sure he is doing this  Labs in 2 weeks he has an order  Thanks

## 2021-12-09 ENCOUNTER — TELEPHONE (OUTPATIENT)
Dept: CARDIOLOGY CLINIC | Age: 56
End: 2021-12-09

## 2021-12-09 DIAGNOSIS — I10 ESSENTIAL HYPERTENSION: Primary | ICD-10-CM

## 2021-12-09 DIAGNOSIS — R06.02 SOB (SHORTNESS OF BREATH): ICD-10-CM

## 2021-12-09 NOTE — TELEPHONE ENCOUNTER
I spoke with pt and relayed lab results per MMK. Pt verbalized understanding. He stated that he took Lasix for a couple days, then started [place]. . He stated that his weight is good . His swelling is increasing, but denies any increase in SOB.

## 2021-12-09 NOTE — TELEPHONE ENCOUNTER
Tried to reach patient, [facility] for patient to call back about the medication change.

## 2021-12-29 ENCOUNTER — HOSPITAL ENCOUNTER (OUTPATIENT)
Age: 56
Discharge: HOME | End: 2021-12-29
Payer: COMMERCIAL

## 2021-12-29 DIAGNOSIS — R06.02 SOB (SHORTNESS OF BREATH): ICD-10-CM

## 2021-12-29 DIAGNOSIS — I10 ESSENTIAL HYPERTENSION: ICD-10-CM

## 2021-12-29 LAB
ANION GAP SERPL CALCULATED.3IONS-SCNC: 11 MMOL/L (ref 3–16)
BUN BLDV-MCNC: 15 MG/DL (ref 7–20)
CALCIUM SERPL-MCNC: 8.8 MG/DL (ref 8.3–10.6)
CHLORIDE BLD-SCNC: 104 MMOL/L (ref 99–110)
CO2: 25 MMOL/L (ref 21–32)
CREAT SERPL-MCNC: 1.2 MG/DL (ref 0.9–1.3)
GFR AFRICAN AMERICAN: >60
GFR NON-AFRICAN AMERICAN: >60
GLUCOSE BLD-MCNC: 136 MG/DL (ref 70–99)
POTASSIUM SERPL-SCNC: 4.4 MMOL/L (ref 3.5–5.1)
PRO-BNP: 2538 PG/ML (ref 0–124)
SODIUM BLD-SCNC: 140 MMOL/L (ref 136–145)

## 2021-12-29 PROCEDURE — 36415 COLL VENOUS BLD VENIPUNCTURE: CPT

## 2021-12-29 PROCEDURE — 83880 ASSAY OF NATRIURETIC PEPTIDE: CPT

## 2021-12-29 PROCEDURE — 80048 BASIC METABOLIC PNL TOTAL CA: CPT

## 2021-12-30 ENCOUNTER — TELEPHONE (OUTPATIENT)
Dept: CARDIOLOGY CLINIC | Age: 56
End: 2021-12-30

## 2021-12-30 NOTE — TELEPHONE ENCOUNTER
----- Message from CHICHI Penn CNP sent at 12/30/2021  8:38 AM EST -----  Labs looking better, no new orders.  Nahum Sol

## 2022-01-17 ENCOUNTER — OFFICE VISIT (OUTPATIENT)
Dept: CARDIOLOGY CLINIC | Age: 57
End: 2022-01-17
Payer: COMMERCIAL

## 2022-01-17 ENCOUNTER — HOSPITAL ENCOUNTER (OUTPATIENT)
Dept: ONCOLOGY | Age: 57
Setting detail: INFUSION SERIES
Discharge: HOME | End: 2022-01-17
Payer: COMMERCIAL

## 2022-01-17 VITALS
TEMPERATURE: 99.8 F | SYSTOLIC BLOOD PRESSURE: 151 MMHG | RESPIRATION RATE: 16 BRPM | HEART RATE: 101 BPM | DIASTOLIC BLOOD PRESSURE: 102 MMHG

## 2022-01-17 VITALS
WEIGHT: 165.7 LBS | HEIGHT: 70 IN | DIASTOLIC BLOOD PRESSURE: 78 MMHG | SYSTOLIC BLOOD PRESSURE: 116 MMHG | OXYGEN SATURATION: 94 % | BODY MASS INDEX: 23.72 KG/M2 | HEART RATE: 104 BPM

## 2022-01-17 DIAGNOSIS — Z91.199 NONCOMPLIANCE: ICD-10-CM

## 2022-01-17 DIAGNOSIS — E55.9 VITAMIN D DEFICIENCY: ICD-10-CM

## 2022-01-17 DIAGNOSIS — F17.200 SMOKER: ICD-10-CM

## 2022-01-17 DIAGNOSIS — I50.22 CHRONIC SYSTOLIC CHF (CONGESTIVE HEART FAILURE) (HCC): ICD-10-CM

## 2022-01-17 DIAGNOSIS — I10 ESSENTIAL HYPERTENSION: ICD-10-CM

## 2022-01-17 DIAGNOSIS — F10.10 ALCOHOL ABUSE: ICD-10-CM

## 2022-01-17 DIAGNOSIS — I50.22 CHRONIC SYSTOLIC CHF (CONGESTIVE HEART FAILURE) (HCC): Primary | ICD-10-CM

## 2022-01-17 DIAGNOSIS — E78.5 HYPERLIPIDEMIA, UNSPECIFIED HYPERLIPIDEMIA TYPE: ICD-10-CM

## 2022-01-17 LAB
ANION GAP SERPL CALCULATED.3IONS-SCNC: 12 MMOL/L (ref 3–16)
BUN BLDV-MCNC: 17 MG/DL (ref 7–20)
CALCIUM SERPL-MCNC: 9.1 MG/DL (ref 8.3–10.6)
CHLORIDE BLD-SCNC: 107 MMOL/L (ref 99–110)
CO2: 22 MMOL/L (ref 21–32)
CREAT SERPL-MCNC: 1.1 MG/DL (ref 0.9–1.3)
GFR AFRICAN AMERICAN: >60
GFR NON-AFRICAN AMERICAN: >60
GLUCOSE BLD-MCNC: 111 MG/DL (ref 70–99)
HCT VFR BLD CALC: 42.2 % (ref 40.5–52.5)
HEMOGLOBIN: 14 G/DL (ref 13.5–17.5)
MCH RBC QN AUTO: 32.4 PG (ref 26–34)
MCHC RBC AUTO-ENTMCNC: 33.1 G/DL (ref 31–36)
MCV RBC AUTO: 97.9 FL (ref 80–100)
PDW BLD-RTO: 15.6 % (ref 12.4–15.4)
PLATELET # BLD: 100 K/UL (ref 135–450)
PLATELET SLIDE REVIEW: ABNORMAL
PMV BLD AUTO: 10.2 FL (ref 5–10.5)
POTASSIUM SERPL-SCNC: 4.4 MMOL/L (ref 3.5–5.1)
PRO-BNP: 5931 PG/ML (ref 0–124)
RBC # BLD: 4.31 M/UL (ref 4.2–5.9)
SLIDE REVIEW: ABNORMAL
SODIUM BLD-SCNC: 141 MMOL/L (ref 136–145)
WBC # BLD: 6 K/UL (ref 4–11)

## 2022-01-17 PROCEDURE — 80048 BASIC METABOLIC PNL TOTAL CA: CPT

## 2022-01-17 PROCEDURE — 85027 COMPLETE CBC AUTOMATED: CPT

## 2022-01-17 PROCEDURE — 93000 ELECTROCARDIOGRAM COMPLETE: CPT | Performed by: CLINICAL NURSE SPECIALIST

## 2022-01-17 PROCEDURE — 6360000002 HC RX W HCPCS: Performed by: CLINICAL NURSE SPECIALIST

## 2022-01-17 PROCEDURE — 96374 THER/PROPH/DIAG INJ IV PUSH: CPT

## 2022-01-17 PROCEDURE — 3017F COLORECTAL CA SCREEN DOC REV: CPT | Performed by: CLINICAL NURSE SPECIALIST

## 2022-01-17 PROCEDURE — G8420 CALC BMI NORM PARAMETERS: HCPCS | Performed by: CLINICAL NURSE SPECIALIST

## 2022-01-17 PROCEDURE — 4004F PT TOBACCO SCREEN RCVD TLK: CPT | Performed by: CLINICAL NURSE SPECIALIST

## 2022-01-17 PROCEDURE — G8427 DOCREV CUR MEDS BY ELIG CLIN: HCPCS | Performed by: CLINICAL NURSE SPECIALIST

## 2022-01-17 PROCEDURE — 99211 OFF/OP EST MAY X REQ PHY/QHP: CPT

## 2022-01-17 PROCEDURE — 83880 ASSAY OF NATRIURETIC PEPTIDE: CPT

## 2022-01-17 PROCEDURE — G8484 FLU IMMUNIZE NO ADMIN: HCPCS | Performed by: CLINICAL NURSE SPECIALIST

## 2022-01-17 PROCEDURE — 99215 OFFICE O/P EST HI 40 MIN: CPT | Performed by: CLINICAL NURSE SPECIALIST

## 2022-01-17 RX ORDER — FUROSEMIDE 10 MG/ML
120 INJECTION INTRAMUSCULAR; INTRAVENOUS ONCE
Status: COMPLETED | OUTPATIENT
Start: 2022-01-17 | End: 2022-01-17

## 2022-01-17 RX ORDER — FUROSEMIDE 20 MG/1
20 TABLET ORAL DAILY
Qty: 90 TABLET | Refills: 0 | Status: SHIPPED | OUTPATIENT
Start: 2022-01-17 | End: 2022-01-18

## 2022-01-17 RX ORDER — SACUBITRIL AND VALSARTAN 24; 26 MG/1; MG/1
1 TABLET, FILM COATED ORAL 2 TIMES DAILY
Qty: 180 TABLET | Refills: 0 | Status: SHIPPED | OUTPATIENT
Start: 2022-01-17 | End: 2022-03-30 | Stop reason: ALTCHOICE

## 2022-01-17 RX ORDER — ATORVASTATIN CALCIUM 40 MG/1
40 TABLET, FILM COATED ORAL DAILY
Qty: 90 TABLET | Refills: 0 | Status: SHIPPED | OUTPATIENT
Start: 2022-01-17 | End: 2022-09-21 | Stop reason: SDUPTHER

## 2022-01-17 RX ADMIN — FUROSEMIDE 120 MG: 10 INJECTION, SOLUTION INTRAMUSCULAR; INTRAVENOUS at 12:47

## 2022-01-17 NOTE — PROGRESS NOTES
by mouth once a week 12/2/21  Yes CHICHI Moreno   sacubitril-valsartan (ENTRESTO) 24-26 MG per tablet Take 1 tablet by mouth 2 times daily 10/21/21  Yes CHICHI Moise CNP   carvedilol (COREG) 25 MG tablet Take 1 tablet by mouth 2 times daily 10/21/21  Yes CHICHI Moise - CNP        Allergies:  Patient has no known allergies. Review of Systems:   · Constitutional: there has been no unanticipated weight loss. There's been no change in energy level, sleep pattern, or activity level. · Eyes: No visual changes or diplopia. No scleral icterus. · ENT: No Headaches, hearing loss or vertigo. No mouth sores or sore throat. · Cardiovascular: Reviewed in HPI  · Respiratory: No cough or wheezing, no sputum production. No hematemesis. · Gastrointestinal: No abdominal pain, appetite loss, blood in stools. No change in bowel or bladder habits. · Genitourinary: No dysuria, trouble voiding, or hematuria. · Musculoskeletal:  No gait disturbance, weakness or joint complaints. · Integumentary: No rash or pruritis. · Neurological: No headache, diplopia, change in muscle strength, numbness or tingling. No change in gait, balance, coordination, mood, affect, memory, mentation, behavior. · Psychiatric: No anxiety, no depression. · Endocrine: No malaise, fatigue or temperature intolerance. No excessive thirst, fluid intake, or urination. No tremor. · Hematologic/Lymphatic: No abnormal bruising or bleeding, blood clots or swollen lymph nodes. · Allergic/Immunologic: No nasal congestion or hives.     Physical Examination:    Vitals:    01/17/22 1147   BP: 116/78   Pulse: 104   SpO2: 94%   Weight: 170 lb 6.4 oz (77.3 kg)   Height: 5' 10\" (1.778 m)        Constitutional and General Appearance: Warm and dry, no apparent distress, normal coloration  HEENT:  Normocephalic, atraumatic  Respiratory:  · Normal excursion and expansion without use of accessory muscles  · Resp Auscultation: Normal breath sounds without dullness  Cardiovascular:  · The apical impulses not displaced  · Heart tones are crisp and normal  · JVP normal cm H2O  · Regular rate and rhythm  · Peripheral pulses are symmetrical and full  · There is no clubbing, cyanosis of the extremities.   · no edema  · Pedal Pulses: 2+ and equal   Abdomen: bloated  · No masses or tenderness  · Liver/Spleen: No Abnormalities Noted  Neurological/Psychiatric:  · Alert and oriented in all spheres  · Moves all extremities well  · Exhibits normal gait balance and coordination  · No abnormalities of mood, affect, memory, mentation, or behavior are noted    Lab Data:    CBC:   Lab Results   Component Value Date    WBC 7.5 03/24/2021    WBC 7.2 10/15/2020    WBC 7.1 02/25/2020    RBC 4.35 03/24/2021    RBC 4.75 10/15/2020    RBC 4.45 02/25/2020    HGB 14.2 03/24/2021    HGB 15.8 10/15/2020    HGB 14.6 02/25/2020    HCT 42.5 03/24/2021    HCT 46.2 10/15/2020    HCT 43.2 02/25/2020    MCV 97.7 03/24/2021    MCV 97.3 10/15/2020    MCV 97.1 02/25/2020    RDW 14.9 03/24/2021    RDW 15.1 10/15/2020    RDW 15.4 02/25/2020     03/24/2021     10/15/2020     02/25/2020     BMP:  Lab Results   Component Value Date     12/29/2021     12/02/2021     09/14/2021    K 4.4 12/29/2021    K 4.8 12/02/2021    K 5.0 09/14/2021    K 4.3 02/24/2020     12/29/2021     12/02/2021     09/14/2021    CO2 25 12/29/2021    CO2 24 12/02/2021    CO2 27 09/14/2021    BUN 15 12/29/2021    BUN 19 12/02/2021    BUN 16 09/14/2021    CREATININE 1.2 12/29/2021    CREATININE 1.2 12/02/2021    CREATININE 1.2 09/14/2021     BNP:   Lab Results   Component Value Date    PROBNP 2,538 12/29/2021    PROBNP 4,540 12/02/2021    PROBNP 2,982 09/14/2021     Cardiac Imaging:  Echo 9/14/2021   Summary   -Left ventricular cavity size is severely dilated with normal left   ventricular wall thickness.   -Overall left ventricular systolic function appears severely patient on anti-platelet: NA  5. CAD patient on STATIN therapy:  Yes  6.  Patient with CHF and aFib on anticoagulation:  NA

## 2022-01-17 NOTE — PATIENT INSTRUCTIONS
1.  IV lasix and labs today in infusion center  2. I will call after I see your labs  3. Might consider adding digoxin and aldactone   4.   RTO in 1 week

## 2022-01-17 NOTE — LETTER
[address] [place] [address] Cardiology [place] [address] [address] [address]  Phone: [phone]  Fax: [phone] CHICHI Stratton        January 17, 2022     Patient: Bailey Gordon   YOB: 1965   Date of Visit: 1/17/2022       To Whom It May Concern: It is my medical opinion that Bailey Gordon be off work 1/17 and 1/18. He was seen in the office today 1/17/2021. If you have any questions or concerns, please don't hesitate to call.     Sincerely,        CHICHI Lester

## 2022-01-18 ENCOUNTER — TELEPHONE (OUTPATIENT)
Dept: CARDIOLOGY CLINIC | Age: 57
End: 2022-01-18

## 2022-01-18 DIAGNOSIS — I50.22 CHRONIC SYSTOLIC CHF (CONGESTIVE HEART FAILURE) (HCC): ICD-10-CM

## 2022-01-18 RX ORDER — FUROSEMIDE 20 MG/1
40 TABLET ORAL DAILY
Qty: 180 TABLET | Refills: 0 | Status: SHIPPED | OUTPATIENT
Start: 2022-01-18 | End: 2022-05-05 | Stop reason: SDUPTHER

## 2022-01-18 NOTE — TELEPHONE ENCOUNTER
I spoke with pt and relayed lab results per NPRG. Pt verbalized understanding. He stated that someone had already spoke with him , with the same message and that he is doing better.

## 2022-01-18 NOTE — TELEPHONE ENCOUNTER
----- Message from CHICHI Ardon - CNS sent at 1/18/2022  9:43 AM EST -----  Please see how he is doing as I gave him IV lasix yesterday  His fluid level was really elevated  Rest of labs good  I want him to increase his lasix to 40 mg daily  thanks

## 2022-01-19 LAB — SARS-COV-2: DETECTED

## 2022-01-20 ENCOUNTER — TELEPHONE (OUTPATIENT)
Dept: CARDIOLOGY CLINIC | Age: 57
End: 2022-01-20

## 2022-01-20 NOTE — TELEPHONE ENCOUNTER
I have instructed him to go to the ER and be evaluated as he is drinking but not eating, having diarrhea and breathing is not improved    He would like his positive covid test faxed to [phone] attn [address] [place] [address]

## 2022-01-20 NOTE — TELEPHONE ENCOUNTER
Patient called in requesting a direct call from [facility] [place]. He would not disclose any information at this time.  Attempted to gain more information and patient wouldn't say anything more but \"I just need to talk to Dr. Landen Dewitt"

## 2022-01-21 ENCOUNTER — HOSPITAL ENCOUNTER (EMERGENCY)
Age: 57
Discharge: LEFT BEFORE DISCHARGE | End: 2022-01-21
Attending: EMERGENCY MEDICINE
Payer: COMMERCIAL

## 2022-01-21 ENCOUNTER — APPOINTMENT (OUTPATIENT)
Dept: CT IMAGING | Age: 57
End: 2022-01-21
Payer: COMMERCIAL

## 2022-01-21 ENCOUNTER — APPOINTMENT (OUTPATIENT)
Dept: GENERAL RADIOLOGY | Age: 57
End: 2022-01-21
Payer: COMMERCIAL

## 2022-01-21 VITALS
HEART RATE: 98 BPM | RESPIRATION RATE: 18 BRPM | SYSTOLIC BLOOD PRESSURE: 144 MMHG | DIASTOLIC BLOOD PRESSURE: 84 MMHG | TEMPERATURE: 98.9 F | OXYGEN SATURATION: 98 %

## 2022-01-21 DIAGNOSIS — U07.1 COVID-19: Primary | ICD-10-CM

## 2022-01-21 DIAGNOSIS — Z53.29 LEFT AGAINST MEDICAL ADVICE: ICD-10-CM

## 2022-01-21 DIAGNOSIS — R77.8 ELEVATED TROPONIN: ICD-10-CM

## 2022-01-21 DIAGNOSIS — J18.9 PNEUMONIA DUE TO INFECTIOUS ORGANISM, UNSPECIFIED LATERALITY, UNSPECIFIED PART OF LUNG: ICD-10-CM

## 2022-01-21 PROBLEM — I50.23 ACUTE ON CHRONIC SYSTOLIC (CONGESTIVE) HEART FAILURE (HCC): Status: ACTIVE | Noted: 2022-01-21

## 2022-01-21 LAB
A/G RATIO: 1.1 (ref 1.1–2.2)
ALBUMIN SERPL-MCNC: 3.4 G/DL (ref 3.4–5)
ALP BLD-CCNC: 69 U/L (ref 40–129)
ALT SERPL-CCNC: 55 U/L (ref 10–40)
ANION GAP SERPL CALCULATED.3IONS-SCNC: 14 MMOL/L (ref 3–16)
ANISOCYTOSIS: ABNORMAL
AST SERPL-CCNC: 52 U/L (ref 15–37)
ATYPICAL LYMPHOCYTE RELATIVE PERCENT: 1 % (ref 0–6)
BASOPHILS ABSOLUTE: 0 K/UL (ref 0–0.2)
BASOPHILS RELATIVE PERCENT: 0 %
BILIRUB SERPL-MCNC: 0.8 MG/DL (ref 0–1)
BUN BLDV-MCNC: 17 MG/DL (ref 7–20)
C-REACTIVE PROTEIN: 18 MG/L (ref 0–5.1)
CALCIUM SERPL-MCNC: 8.6 MG/DL (ref 8.3–10.6)
CHLORIDE BLD-SCNC: 102 MMOL/L (ref 99–110)
CO2: 23 MMOL/L (ref 21–32)
CREAT SERPL-MCNC: 1.1 MG/DL (ref 0.9–1.3)
EKG ATRIAL RATE: 83 BPM
EKG DIAGNOSIS: NORMAL
EKG P AXIS: 66 DEGREES
EKG P-R INTERVAL: 140 MS
EKG Q-T INTERVAL: 400 MS
EKG QRS DURATION: 96 MS
EKG QTC CALCULATION (BAZETT): 470 MS
EKG R AXIS: -49 DEGREES
EKG T AXIS: 247 DEGREES
EKG VENTRICULAR RATE: 83 BPM
EOSINOPHILS ABSOLUTE: 0 K/UL (ref 0–0.6)
EOSINOPHILS RELATIVE PERCENT: 0 %
GFR AFRICAN AMERICAN: >60
GFR NON-AFRICAN AMERICAN: >60
GLUCOSE BLD-MCNC: 111 MG/DL (ref 70–99)
HCT VFR BLD CALC: 41.4 % (ref 40.5–52.5)
HEMOGLOBIN: 13.8 G/DL (ref 13.5–17.5)
LACTIC ACID, SEPSIS: 1.1 MMOL/L (ref 0.4–1.9)
LYMPHOCYTES ABSOLUTE: 1.2 K/UL (ref 1–5.1)
LYMPHOCYTES RELATIVE PERCENT: 25 %
MACROCYTES: ABNORMAL
MCH RBC QN AUTO: 32.3 PG (ref 26–34)
MCHC RBC AUTO-ENTMCNC: 33.2 G/DL (ref 31–36)
MCV RBC AUTO: 97.1 FL (ref 80–100)
MONOCYTES ABSOLUTE: 0.8 K/UL (ref 0–1.3)
MONOCYTES RELATIVE PERCENT: 17 %
NEUTROPHILS ABSOLUTE: 2.6 K/UL (ref 1.7–7.7)
NEUTROPHILS RELATIVE PERCENT: 57 %
OVALOCYTES: ABNORMAL
PDW BLD-RTO: 15.6 % (ref 12.4–15.4)
PLATELET # BLD: 111 K/UL (ref 135–450)
PLATELET SLIDE REVIEW: ABNORMAL
PMV BLD AUTO: 9.6 FL (ref 5–10.5)
POLYCHROMASIA: ABNORMAL
POTASSIUM REFLEX MAGNESIUM: 4.1 MMOL/L (ref 3.5–5.1)
PRO-BNP: 1763 PG/ML (ref 0–124)
PROCALCITONIN: 0.25 NG/ML (ref 0–0.15)
RBC # BLD: 4.27 M/UL (ref 4.2–5.9)
SLIDE REVIEW: ABNORMAL
SODIUM BLD-SCNC: 139 MMOL/L (ref 136–145)
TEAR DROP CELLS: ABNORMAL
TOTAL PROTEIN: 6.5 G/DL (ref 6.4–8.2)
TROPONIN: 0.03 NG/ML
WBC # BLD: 4.6 K/UL (ref 4–11)

## 2022-01-21 PROCEDURE — 71260 CT THORAX DX C+: CPT

## 2022-01-21 PROCEDURE — 93005 ELECTROCARDIOGRAM TRACING: CPT | Performed by: PHYSICIAN ASSISTANT

## 2022-01-21 PROCEDURE — 99283 EMERGENCY DEPT VISIT LOW MDM: CPT

## 2022-01-21 PROCEDURE — 96365 THER/PROPH/DIAG IV INF INIT: CPT

## 2022-01-21 PROCEDURE — 83880 ASSAY OF NATRIURETIC PEPTIDE: CPT

## 2022-01-21 PROCEDURE — 93010 ELECTROCARDIOGRAM REPORT: CPT | Performed by: INTERNAL MEDICINE

## 2022-01-21 PROCEDURE — 94640 AIRWAY INHALATION TREATMENT: CPT

## 2022-01-21 PROCEDURE — 87040 BLOOD CULTURE FOR BACTERIA: CPT

## 2022-01-21 PROCEDURE — 2580000003 HC RX 258: Performed by: PHYSICIAN ASSISTANT

## 2022-01-21 PROCEDURE — 85025 COMPLETE CBC W/AUTO DIFF WBC: CPT

## 2022-01-21 PROCEDURE — 6370000000 HC RX 637 (ALT 250 FOR IP): Performed by: PHYSICIAN ASSISTANT

## 2022-01-21 PROCEDURE — 80053 COMPREHEN METABOLIC PANEL: CPT

## 2022-01-21 PROCEDURE — 86140 C-REACTIVE PROTEIN: CPT

## 2022-01-21 PROCEDURE — 94760 N-INVAS EAR/PLS OXIMETRY 1: CPT

## 2022-01-21 PROCEDURE — 96375 TX/PRO/DX INJ NEW DRUG ADDON: CPT

## 2022-01-21 PROCEDURE — 1200000000 HC SEMI PRIVATE

## 2022-01-21 PROCEDURE — 6360000004 HC RX CONTRAST MEDICATION: Performed by: PHYSICIAN ASSISTANT

## 2022-01-21 PROCEDURE — 36415 COLL VENOUS BLD VENIPUNCTURE: CPT

## 2022-01-21 PROCEDURE — 84145 PROCALCITONIN (PCT): CPT

## 2022-01-21 PROCEDURE — 84484 ASSAY OF TROPONIN QUANT: CPT

## 2022-01-21 PROCEDURE — 83605 ASSAY OF LACTIC ACID: CPT

## 2022-01-21 PROCEDURE — 6360000002 HC RX W HCPCS: Performed by: PHYSICIAN ASSISTANT

## 2022-01-21 PROCEDURE — 71045 X-RAY EXAM CHEST 1 VIEW: CPT

## 2022-01-21 RX ORDER — ONDANSETRON 4 MG/1
4 TABLET, ORALLY DISINTEGRATING ORAL EVERY 8 HOURS PRN
Status: CANCELLED | OUTPATIENT
Start: 2022-01-21

## 2022-01-21 RX ORDER — SODIUM CHLORIDE 9 MG/ML
25 INJECTION, SOLUTION INTRAVENOUS PRN
Status: CANCELLED | OUTPATIENT
Start: 2022-01-21

## 2022-01-21 RX ORDER — ATORVASTATIN CALCIUM 80 MG/1
40 TABLET, FILM COATED ORAL DAILY
Status: CANCELLED | OUTPATIENT
Start: 2022-01-21

## 2022-01-21 RX ORDER — SODIUM CHLORIDE 0.9 % (FLUSH) 0.9 %
5-40 SYRINGE (ML) INJECTION PRN
Status: CANCELLED | OUTPATIENT
Start: 2022-01-21

## 2022-01-21 RX ORDER — SODIUM CHLORIDE 0.9 % (FLUSH) 0.9 %
5-40 SYRINGE (ML) INJECTION EVERY 12 HOURS SCHEDULED
Status: CANCELLED | OUTPATIENT
Start: 2022-01-21

## 2022-01-21 RX ORDER — INSULIN LISPRO 100 [IU]/ML
0-3 INJECTION, SOLUTION INTRAVENOUS; SUBCUTANEOUS NIGHTLY
Status: CANCELLED | OUTPATIENT
Start: 2022-01-21

## 2022-01-21 RX ORDER — AZITHROMYCIN 250 MG/1
TABLET, FILM COATED ORAL
Qty: 1 PACKET | Refills: 0 | Status: SHIPPED | OUTPATIENT
Start: 2022-01-21 | End: 2022-01-21 | Stop reason: SDUPTHER

## 2022-01-21 RX ORDER — DEXTROSE MONOHYDRATE 50 MG/ML
100 INJECTION, SOLUTION INTRAVENOUS PRN
Status: CANCELLED | OUTPATIENT
Start: 2022-01-21

## 2022-01-21 RX ORDER — FUROSEMIDE 10 MG/ML
40 INJECTION INTRAMUSCULAR; INTRAVENOUS 2 TIMES DAILY
Status: CANCELLED | OUTPATIENT
Start: 2022-01-21

## 2022-01-21 RX ORDER — IPRATROPIUM BROMIDE AND ALBUTEROL SULFATE 2.5; .5 MG/3ML; MG/3ML
1 SOLUTION RESPIRATORY (INHALATION) ONCE
Status: COMPLETED | OUTPATIENT
Start: 2022-01-21 | End: 2022-01-21

## 2022-01-21 RX ORDER — NICOTINE POLACRILEX 4 MG
15 LOZENGE BUCCAL PRN
Status: CANCELLED | OUTPATIENT
Start: 2022-01-21

## 2022-01-21 RX ORDER — DEXTROSE MONOHYDRATE 25 G/50ML
12.5 INJECTION, SOLUTION INTRAVENOUS PRN
Status: CANCELLED | OUTPATIENT
Start: 2022-01-21

## 2022-01-21 RX ORDER — ASPIRIN 81 MG/1
324 TABLET, CHEWABLE ORAL ONCE
Status: COMPLETED | OUTPATIENT
Start: 2022-01-21 | End: 2022-01-21

## 2022-01-21 RX ORDER — AZITHROMYCIN 250 MG/1
TABLET, FILM COATED ORAL
Qty: 1 PACKET | Refills: 0 | Status: SHIPPED | OUTPATIENT
Start: 2022-01-21 | End: 2022-01-31

## 2022-01-21 RX ORDER — ACETAMINOPHEN 325 MG/1
650 TABLET ORAL EVERY 6 HOURS PRN
Status: CANCELLED | OUTPATIENT
Start: 2022-01-21

## 2022-01-21 RX ORDER — CARVEDILOL 25 MG/1
25 TABLET ORAL 2 TIMES DAILY
Status: CANCELLED | OUTPATIENT
Start: 2022-01-21

## 2022-01-21 RX ORDER — POLYETHYLENE GLYCOL 3350 17 G/17G
17 POWDER, FOR SOLUTION ORAL DAILY PRN
Status: CANCELLED | OUTPATIENT
Start: 2022-01-21

## 2022-01-21 RX ORDER — ONDANSETRON 2 MG/ML
4 INJECTION INTRAMUSCULAR; INTRAVENOUS EVERY 6 HOURS PRN
Status: CANCELLED | OUTPATIENT
Start: 2022-01-21

## 2022-01-21 RX ORDER — ERGOCALCIFEROL 1.25 MG/1
50000 CAPSULE ORAL WEEKLY
Status: CANCELLED | OUTPATIENT
Start: 2022-01-21

## 2022-01-21 RX ORDER — INSULIN LISPRO 100 [IU]/ML
0-6 INJECTION, SOLUTION INTRAVENOUS; SUBCUTANEOUS
Status: CANCELLED | OUTPATIENT
Start: 2022-01-21

## 2022-01-21 RX ORDER — ACETAMINOPHEN 650 MG/1
650 SUPPOSITORY RECTAL EVERY 6 HOURS PRN
Status: CANCELLED | OUTPATIENT
Start: 2022-01-21

## 2022-01-21 RX ADMIN — AZITHROMYCIN MONOHYDRATE 500 MG: 500 INJECTION, POWDER, LYOPHILIZED, FOR SOLUTION INTRAVENOUS at 13:06

## 2022-01-21 RX ADMIN — Medication 1000 MG: at 13:06

## 2022-01-21 RX ADMIN — IOPAMIDOL 75 ML: 755 INJECTION, SOLUTION INTRAVENOUS at 11:54

## 2022-01-21 RX ADMIN — ASPIRIN 81 MG 324 MG: 81 TABLET ORAL at 13:01

## 2022-01-21 RX ADMIN — IPRATROPIUM BROMIDE AND ALBUTEROL SULFATE 1 AMPULE: .5; 3 SOLUTION RESPIRATORY (INHALATION) at 11:13

## 2022-01-21 ASSESSMENT — ENCOUNTER SYMPTOMS
RECTAL PAIN: 0
WHEEZING: 1
BLOOD IN STOOL: 0
DIARRHEA: 1
ABDOMINAL PAIN: 0
COLOR CHANGE: 0
BACK PAIN: 0
ANAL BLEEDING: 0
COUGH: 1
SHORTNESS OF BREATH: 1
NAUSEA: 0
CONSTIPATION: 0
VOMITING: 0
CHEST TIGHTNESS: 0
ABDOMINAL DISTENTION: 0
PHOTOPHOBIA: 0

## 2022-01-21 ASSESSMENT — HEART SCORE: ECG: 0

## 2022-01-21 NOTE — Clinical Note
Patient Class: Inpatient [101]   REQUIRED: Diagnosis: Acute on chronic systolic (congestive) heart failure ([facility] [[id number]]   Estimated Length of Stay: Estimated stay of more than 2 midnights

## 2022-01-21 NOTE — ED PROVIDER NOTES
This patient was seen by the Mid-Level Provider. I have seen and examined the patient, agree with the workup, evaluation, management and diagnosis. Care plan has been discussed. My assessment reveals a 59-year-old male who presents with a cough and congestion. This is a 59-year-old male who presents after being COVID-positive with a cough and congestion. Patient's primary care physician advised him to come to the emergency department for further care. The patient has a history of cardiomyopathy. Radiology results:    CT CHEST PULMONARY EMBOLISM W CONTRAST   Final Result   1. Negative for pulmonary embolus. 2. Mild central airways disease with left basilar subsegmental pneumonia. Pedro Awkarie       RECOMMENDATIONS:   Unavailable         XR CHEST PORTABLE   Final Result   Moderate to severe cardiomegaly               LABS:    Labs Reviewed   CBC WITH AUTO DIFFERENTIAL - Abnormal; Notable for the following components:       Result Value    RDW 15.6 (*)     Platelets 724 (*)     Anisocytosis Occasional (*)     Macrocytes Occasional (*)     Polychromasia Occasional (*)     Ovalocytes Occasional (*)     Tear Drop Cells Occasional (*)     All other components within normal limits    Narrative:     Performed at:  [facility]  [address] [facility],  [address], [address]   Phone [phone]   COMPREHENSIVE METABOLIC PANEL W/ REFLEX TO MG FOR LOW K - Abnormal; Notable for the following components:    Glucose 111 (*)     ALT 55 (*)     AST 52 (*)     All other components within normal limits    Narrative:     Performed at:  [facility]  [address] [facility],  [address], [address]   Phone [phone]   TROPONIN - Abnormal; Notable for the following components:    Troponin 0.03 (*)     All other components within normal limits    Narrative:     Performed at:  [facility]  [address] [facility],  [address], [address]   Phone [phone] BRAIN NATRIURETIC PEPTIDE - Abnormal; Notable for the following components:    Pro-BNP 1,763 (*)     All other components within normal limits    Narrative:     Performed at:  [facility] [address] [facility] [employer] [address] [employer]   Phone [phone]   PROCALCITONIN - Abnormal; Notable for the following components:    Procalcitonin 0.25 (*)     All other components within normal limits    Narrative:     Performed at:  [facility] [address],  [place], [facility] [employer]   Phone [phone]   C-REACTIVE PROTEIN - Abnormal; Notable for the following components:    CRP 18.0 (*)     All other components within normal limits    Narrative:     Performed at:  [facility] [address] [facility] [place], [facility] [employer] [address] [employer]   Phone [phone]   CULTURE, BLOOD 1   CULTURE, BLOOD 2   LACTATE, SEPSIS    Narrative:     Performed at:  [facility] [address],  [place], [facility] [employer]   Phone [phone]   LACTATE, SEPSIS           EKG:    Sinus rhythm at a rate of 83 beats a minute with no acute ST elevations or depressions or pathologic Q waves. Patient did have some T wave inversions in the lateral precordial leads. Exam:    Well-nourished male in no acute distress. Abdomen is soft and benign with no guarding or rebound. Chest was clear to auscultation bilaterally. Medical decision making:    Well-nourished male in no acute distress. He was neurologically alert with no focal motor or sensory deficits throughout. The patient's work-up did show pneumonia and an elevated troponin. Given the patient's significant risk factors he will be admitted for further care and IV antibiotics. FINAL IMPRESSION:    1. COVID-19    2. Pneumonia due to infectious organism, unspecified laterality, unspecified part of lung    3.  Elevated troponin           Roselyn Guallpa MD  01/21/22 9458      Addendum: After we admitted the patient and started treatment he is leaving [address] He is already been seen by the hospitalist and myself. I went back into the room and discussed the findings with the patient including his abnormal findings such as his elevated troponin and pneumonia. I made it clear that he could go home and die from these findings. He was given the risks of leaving against my advice. He understands the risks. He understands he is welcome to return if he changes his mind and should. He appeared to be competent to make these decisions.   Eduardo Vasquez MD  01/21/22 0295

## 2022-01-21 NOTE — ED NOTES
Pt stating he wants to leave. Dr. Cheyenne Patterson bedside to explain risks of leaving. Pt verbalized understanding. AMA form signed. Pt ambulated out of ER exit with steady gait.      Caryl Chatterjee RN  01/21/22 9601

## 2022-01-21 NOTE — ED PROVIDER NOTES
[facility]        Pt Name: Nancy Babcock  MRN: [id number]  [place] 1965  Date of evaluation: 1/21/2022  Provider: ANTONIA Marte  PCP: CHICHI Rodríguez CNP  Note Started: 10:41 AM EST        I have seen and evaluated this patient with my supervising physician Jamaal Umana MD.    [address] [place] [address]       Chief Complaint   Patient presents with    Positive For Covid-19     patient states he came to the er because he is covid positive and having congestion. states his pcp advised him to come here for rx        HISTORY OF PRESENT ILLNESS   (Location, Timing/Onset, Context/Setting, Quality, Duration, Modifying Factors, Severity, Associated Signs and Symptoms)  Note limiting factors. Chief Complaint: COVID+/SOB     Nancy Babcock is a 64 y.o. male with past medical history of tobacco use, CHF, hypertension who presents to the ED with complaint of known COVID-19 infection. Patient states he has been feeling poorly for the past week. States he tested positive for COVID-19 on Monday. He states he has had feelings of shortness of breath, fatigue, decreased energy and cough. He states cough is nonproductive. He states he has some chest congestion and feels like he needs to \"cough up some phlegm\". He states he was seen by cardiology on the 17th for symptoms and tested positive for COVID-19. He was also told that he was fluid overloaded. Patient states he received some Lasix at the infusion center and states he was told increase his Lasix dosage to 40 mg a day. He has been doing this and he states that his fluid level has decreased significantly. He denies any pedal edema, weight gain or bloating/abdominal distention. He states he has not had much appetite and has had some diarrhea. He states he has had some feelings of fever and chills.   States diarrhea, fever, chills and decreased oral intake have since improved but states he continues to have shortness of breath with cough. Dates he believes he needs a prescription for cough medication and came to the ED for further evaluation and treatment. Denies any rashes or lesions. Denies headache, lightheadedness/dizziness, syncope, near syncope, pleuritic pain, orthopnea, pedal edema or calf tenderness. Denies history of DVT or PE. Denies any blood thinning medication usage. Nursing Notes were all reviewed and agreed with or any disagreements were addressed in the HPI. REVIEW OF SYSTEMS    (2-9 systems for level 4, 10 or more for level 5)     Review of Systems   Constitutional: Positive for activity change, appetite change, chills, fatigue and fever. Negative for diaphoresis. Eyes: Negative for photophobia and visual disturbance. Respiratory: Positive for cough, shortness of breath and wheezing. Negative for chest tightness. Cardiovascular: Negative. Negative for chest pain, palpitations and leg swelling. Gastrointestinal: Positive for diarrhea. Negative for abdominal distention, abdominal pain, anal bleeding, blood in stool, constipation, nausea, rectal pain and vomiting. Genitourinary: Negative for decreased urine volume, difficulty urinating, dysuria, flank pain, frequency, hematuria and urgency. Musculoskeletal: Negative for arthralgias, back pain, myalgias, neck pain and neck stiffness. Skin: Negative for color change, pallor, rash and wound. Neurological: Negative for dizziness, light-headedness and headaches. Positives and Pertinent negatives as per HPI. Except as noted above in the ROS, all other systems were reviewed and negative. PAST MEDICAL HISTORY     Past Medical History:   Diagnosis Date    Asthma          SURGICAL HISTORY   No past surgical history on file.       CURRENTMEDICATIONS       Previous Medications    ATORVASTATIN (LIPITOR) 40 MG TABLET    Take 1 tablet by mouth daily    CARVEDILOL (COREG) 25 MG TABLET    Take 1 tablet by mouth 2 times daily    DAPAGLIFLOZIN (FARXIGA) 10 MG TABLET    Take 1 tablet by mouth every morning    FUROSEMIDE (LASIX) 20 MG TABLET    Take 2 tablets by mouth daily And an extra 20 mg with increased sob    SACUBITRIL-VALSARTAN (ENTRESTO) 24-26 MG PER TABLET    Take 1 tablet by mouth 2 times daily    VITAMIN D (ERGOCALCIFEROL) 1.25 MG (40554 UT) CAPS CAPSULE    Take 1 capsule by mouth once a week         ALLERGIES     Patient has no known allergies. FAMILYHISTORY     No family history on file. SOCIAL HISTORY       Social History     Tobacco Use    Smoking status: Current Every Day Smoker     Types: Cigars    Smokeless tobacco: Never Used    Tobacco comment: 2 cigars/day   Vaping Use    Vaping Use: Former    Substances: Always   Substance Use Topics    Alcohol use: Yes     Alcohol/week: 4.2 standard drinks     Types: 5 Standard drinks or equivalent per week     Comment: 0cc    Drug use: No       SCREENINGS      Heart Score for chest pain patients  History: Moderately Suspicious  ECG: Normal  Patient Age: > 39 and < 65 years  *Risk factors for Atherosclerotic disease: Cigarette smoking,Hypertension  Risk Factors: 1 or 2 risk factors  Troponin: > 1 and < 3X normal limit  Heart Score Total: 4      PHYSICAL EXAM    (up to 7 for level 4, 8 or more for level 5)     ED Triage Vitals [01/21/22 1026]   BP Temp Temp Source Pulse Resp SpO2 Height Weight   134/87 98.9 °F (37.2 °C) Oral 98 18 99 % -- --       Physical Exam  Constitutional:       General: He is not in acute distress. Appearance: Normal appearance. He is well-developed. He is not ill-appearing, toxic-appearing or diaphoretic. HENT:      Head: Normocephalic and atraumatic. Right Ear: External ear normal.      Left Ear: External ear normal.   Eyes:      General:         Right eye: No discharge. Left eye: No discharge.       Conjunctiva/sclera: Conjunctivae normal.   Cardiovascular:      Rate and Rhythm: Normal rate and regular rhythm. Pulses: Normal pulses. Heart sounds: Normal heart sounds. No murmur heard. No friction rub. No gallop. Comments: 2+ radial pulses bilaterally. There is no pedal edema. No calf tenderness. No JVD  Pulmonary:      Effort: Pulmonary effort is normal. No respiratory distress. Breath sounds: No stridor. Rhonchi present. No wheezing or rales. Comments: Rhonchorous lung sounds throughout without wheezing. No tachypnea. No conversational dyspnea. No hypoxia  Chest:      Chest wall: No tenderness. Abdominal:      General: Abdomen is flat. Bowel sounds are normal.      Palpations: Abdomen is soft. There is no mass. Tenderness: There is no abdominal tenderness. There is no right CVA tenderness, left CVA tenderness, guarding or rebound. Hernia: No hernia is present. Musculoskeletal:         General: Normal range of motion. Cervical back: Normal range of motion and neck supple. No rigidity or tenderness. Lymphadenopathy:      Cervical: No cervical adenopathy. Skin:     General: Skin is warm and dry. Coloration: Skin is not pale. Findings: No erythema or rash. Neurological:      Mental Status: He is alert and oriented to person, place, and time.    Psychiatric:         Behavior: Behavior normal.         DIAGNOSTIC RESULTS   LABS:    Labs Reviewed   CBC WITH AUTO DIFFERENTIAL - Abnormal; Notable for the following components:       Result Value    RDW 15.6 (*)     Platelets 760 (*)     Anisocytosis Occasional (*)     Macrocytes Occasional (*)     Polychromasia Occasional (*)     Ovalocytes Occasional (*)     Tear Drop Cells Occasional (*)     All other components within normal limits    Narrative:     Performed at:  [facility] [address], [place], [address]   Phone [phone]   COMPREHENSIVE METABOLIC PANEL W/ REFLEX TO MG FOR LOW K - Abnormal; Notable for the following components:    Glucose 111 (*)     ALT 55 (*)     AST 52 (*)     All other components within normal limits    Narrative:     Performed at:  [facility]  [email or web address],  [place], [address]   Phone [phone]   TROPONIN - Abnormal; Notable for the following components:    Troponin 0.03 (*)     All other components within normal limits    Narrative:     Performed at:  [facility]  [email or web address],  [place], [address]   Phone [phone]  - Abnormal; Notable for the following components:    Pro-BNP 1,763 (*)     All other components within normal limits    Narrative:     Performed at:  [facility]  [email or web address],  [place], [address]   Phone [phone]   CULTURE, BLOOD 1   CULTURE, BLOOD 2   LACTATE, SEPSIS    Narrative:     Performed at:  [facility]  [email or web address],  [place], [address]   Phone [phone]   LACTATE, SEPSIS   PROCALCITONIN   C-REACTIVE PROTEIN       When ordered only abnormal lab results are displayed. All other labs were within normal range or not returned as of this dictation. EKG: When ordered, EKG's are interpreted by the Emergency Department Physician in the absence of a cardiologist.  Please see their note for interpretation of EKG. RADIOLOGY:   Non-plain film images such as CT, Ultrasound and MRI are read by the radiologist. Plain radiographic images are visualized and preliminarily interpreted by the ED Provider with the below findings:        Interpretation per the Radiologist below, if available at the time of this note:    CT CHEST PULMONARY EMBOLISM W CONTRAST   Final Result   1. Negative for pulmonary embolus. 2. Mild central airways disease with left basilar subsegmental pneumonia. Fransisco Bowles RECOMMENDATIONS:   Unavailable         XR CHEST PORTABLE   Final Result   Moderate to severe cardiomegaly           No results found.         PROCEDURES   Unless otherwise noted below, none     Procedures    CRITICAL CARE TIME       CONSULTS:  None      EMERGENCY DEPARTMENT COURSE and DIFFERENTIAL DIAGNOSIS/MDM:   Vitals:    Vitals:    01/21/22 1026 01/21/22 1115 01/21/22 1151   BP: 134/87  129/78   Pulse: 98     Resp: 18 18    Temp: 98.9 °F (37.2 °C)     TempSrc: Oral     SpO2: 99% 99% 96%       Patient was given the following medications:  Medications   cefTRIAXone (ROCEPHIN) 1000 mg in sterile water 10 mL IV syringe (1,000 mg IntraVENous Given 1/21/22 1306)     And   azithromycin (ZITHROMAX) 500 mg in D5W 250ml Vial Mate (500 mg IntraVENous New Bag 1/21/22 1306)   ipratropium-albuterol (DUONEB) nebulizer solution 1 ampule (1 ampule Inhalation Given 1/21/22 1113)   iopamidol (ISOVUE-370) 76 % injection 75 mL (75 mLs IntraVENous Given 1/21/22 1154)   aspirin chewable tablet 324 mg (324 mg Oral Given 1/21/22 1301)           Patient is a 59-year-old male who presents to the ED with complaint of COVID-19. States he tested positive for COVID-19 on Monday. He has been feeling poorly for the past week. He states he has had subjective improvement of fever, chills, diarrhea and decreased oral intake but has had increasing cough and feelings of shortness of breath/fatigue. States he feels generally weak. States he feels like he has no energy. Patient was seen by cardiology earlier this week and diagnosed with some fluid overload. He did receive dose of IV Lasix through the infusion center. He has increased his Lasix dosage at home. Patient states his fluid level has improved significantly with this but he still has increasing weakness and fatigue. Came to the ED for further evaluation and treatment. Patient not tachycardic. He is not tachypneic. Not hypoxic. No conversational dyspnea. Lungs show rhonchorous lung sounds throughout. He was given DuoNeb treatment here in the ED. IV established and blood work obtained.   CBC showed normal white count, hemoglobin and platelets of 004. There is normal lymphocytes. CMP with ALT of 55 and AST of 52. Otherwise unremarkable. Troponin was elevated 0.03. Patient has no history of CAD. Last cardiac catheterization per review of records back in February 2020 with normal coronaries at that time. BNP is 1700. BNP significantly improved from earlier this week with IV Lasix on outpatient basis through the infusion center and also increasing his oral Lasix at home. Chest x-ray shows some cardiomegaly but otherwise unremarkable. Given patient's known COVID-19 infection with elevated troponin CT of the chest was obtained to evaluate for PE.  CT showed no evidence of pulmonary embolism. There does appear to be some central airway disease with left basilar subsegmental pneumonia. Patient was started on antibiotics with Rocephin and azithromycin for concern for underlying pneumonia. He has elevated troponin with known COVID-19 infection and pneumonia. Believe he would benefit from admission for further evaluation by cardiology and trending of troponin. He denies any chest pain at this time. He was given aspirin here in the emergency department. Case discussed with hospitalist service. FINAL IMPRESSION      1. COVID-19    2. Pneumonia due to infectious organism, unspecified laterality, unspecified part of lung    3. Elevated troponin          DISPOSITION/PLAN   DISPOSITION Decision To Admit 01/21/2022 01:09:29 PM      PATIENT REFERRED TO:  No follow-up provider specified.     DISCHARGE MEDICATIONS:  New Prescriptions    No medications on file       DISCONTINUED MEDICATIONS:  Discontinued Medications    No medications on file              (Please note that portions of this note were completed with a voice recognition program.  Efforts were made to edit the dictations but occasionally words are mis-transcribed.)    ANTONIA Watkins (electronically signed)          ANTONIA Benitez  01/21/22 0862

## 2022-01-21 NOTE — PROGRESS NOTES
CLINICAL PHARMACY NOTE: MEDS TO BEDS    Total # of Prescriptions Filled: 1   The following medications were delivered to the patient:  · Azithromycin 250mg    Additional Documentation:    Medication was picked up in the Outpatient Pharmacy by patient    Isabela Mujica

## 2022-01-21 NOTE — H&P
Hospital Medicine History & Physical      PCP: CHICHI Hoover CNP    Date of Admission: 1/21/2022    Date of Service: Pt seen/examined on 1/21/2022 and Admitted to Inpatient with expected LOS greater than two midnights due to medical therapy. Chief Complaint: Cough and dyspnea      History Of Present Illness:  64 y.o. male with past medical history of nonischemic cardiomyopathy with EF of 15 to 20% as per echocardiogram September 2021, hypertension, CKD presents from home with above complaints. Patient appears to be a poor historian. To many questions patient answers can to just look at my chart. Patient was tested positive for COVID-19 on 1/17/2022. He is fully vaccinated with Geddit vaccine. He was seen by cardiology here at [facility] and was deemed to be fluid overloaded. He has had several visits to infusion center for IV Lasix. He stated that his lower extremity swelling has improved however his shortness of breath did not get better. After he spoke to cardiology again over the phone they recommended for him to come into the ER. At the ED today patient is maintaining good oxygen saturations to 98% on room air, CT chest negative for pulmonary embolus, has mild central airway disease with left basilar subsegmental pneumonia. His BNP today is 1763 which is less than 5931 on January 17. Overall appears to be near his baseline. Troponin is elevated at 0.03. Hospitalist has been consulted for admission. Past Medical History:          Diagnosis Date    Asthma        Past Surgical History:      No past surgical history on file. Medications Prior to Admission:      Prior to Admission medications    Medication Sig Start Date End Date Taking?  Authorizing Provider   azithromycin (ZITHROMAX) 250 MG tablet Take 2 tablets (500 mg) on Day 1, followed by 1 tablet (250 mg) once daily on Days 2 through 5. 1/21/22 1/31/22 Yes Yancy Tubbs MD   furosemide (LASIX) 20 MG tablet Take 2 tablets by mouth daily And an extra 20 mg with increased sob 1/18/22   Azul Aquino, APRN - CNS   sacubitril-valsartan (ENTRESTO) 24-26 MG per tablet Take 1 tablet by mouth 2 times daily 1/17/22   Azul Aquino APRN - CNS   atorvastatin (LIPITOR) 40 MG tablet Take 1 tablet by mouth daily 1/17/22   Azul Aquino, APRN - CNS   dapagliflozin (FARXIGA) 10 MG tablet Take 1 tablet by mouth every morning 12/2/21   Azul [facility] APRN - CNS   vitamin D (ERGOCALCIFEROL) 1.25 MG (89263 UT) CAPS capsule Take 1 capsule by mouth once a week 12/2/21   Azul Aquino, APRN [facility]   carvedilol (COREG) 25 MG tablet Take 1 tablet by mouth 2 times daily 10/21/21   Lucetta Katja, APRN - CNP       Allergies:  Patient has no known allergies. Social History:      The patient currently lives home    TOBACCO:   reports that he has been smoking cigars. He has never used smokeless tobacco.  ETOH:   reports current alcohol use of about 4.2 standard drinks of alcohol per week. E-Cigarettes/Vaping Use     Questions Responses    E-Cigarette/Vaping Use Former User    Start Date     Passive Exposure     Quit Date     Counseling Given     Comments Mini or Cigalike            Family History:       Reviewed in detail and negative for DM, CAD, Cancer, CVA. Positive as follows:    No family history on file. REVIEW OF SYSTEMS:   Pertinent positives as noted in the HPI. All other systems reviewed and negative. PHYSICAL EXAM PERFORMED:    BP (!) 144/84   Pulse 98   Temp 98.9 °F (37.2 °C) (Oral)   Resp 18   SpO2 98%     General appearance:  No apparent distress, appears stated age and cooperative. HEENT:  Normal cephalic, atraumatic without obvious deformity. Pupils equal, round, and reactive to light. Extra ocular muscles intact. Conjunctivae/corneas clear. Neck: Supple, with full range of motion. No jugular venous distention. Trachea midline. Respiratory: Prolonged expiratory phase, diffuse rhonchi and wheezing. Cardiovascular:  Regular rate and rhythm with normal S1/S2 without murmurs, rubs or gallops. Abdomen: Soft, non-tender, non-distended with normal bowel sounds. Musculoskeletal:  No clubbing, cyanosis or edema bilaterally. Full range of motion without deformity. Skin: Skin color, texture, turgor normal.  No rashes or lesions. Neurologic:  Neurovascularly intact without any focal sensory/motor deficits. Cranial nerves: II-XII intact, grossly non-focal.  Psychiatric:  Alert and oriented, thought content appropriate, normal insight  Capillary Refill: Brisk,< 3 seconds   Peripheral Pulses: +2 palpable, equal bilaterally       Labs:     Recent Labs     01/21/22  1048   WBC 4.6   HGB 13.8   HCT 41.4   *     Recent Labs     01/21/22  1048      K 4.1      CO2 23   BUN 17   CREATININE 1.1   CALCIUM 8.6     Recent Labs     01/21/22  1048   AST 52*   ALT 55*   BILITOT 0.8   ALKPHOS 69     No results for input(s): INR in the last 72 hours. Recent Labs     01/21/22  1048   TROPONINI 0.03*       Urinalysis:    No results found for: [place], BACTERIA, RBCUA, BLOODU, [address], [place] [address]    Radiology:     CXR: I have reviewed the CXR with the following interpretation: Cardiomegaly, no other findings. Please see CT chest report  EKG:  I have reviewed the EKG with the following interpretation: Sinus rhythm with PACs and other abnormalities. CT CHEST PULMONARY EMBOLISM W CONTRAST   Final Result   1. Negative for pulmonary embolus. 2. Mild central airways disease with left basilar subsegmental pneumonia. Loretta Phillips RECOMMENDATIONS:   Unavailable         XR CHEST PORTABLE   Final Result   Moderate to severe cardiomegaly             ASSESSMENT:    Active Hospital Problems    Diagnosis Date Noted    Acute on chronic systolic (congestive) heart failure ([facility] [address] [I50.23] 01/21/2022         PLAN:    Acute on chronic systolic congestive heart failure  No history of NICM  Last known EF 15 to 20%  IV Lasix. He  Strict I's and O's  Continue beta-blocker and [facility]  Cardiology consultation    Elevated troponin  In view of demand ischemia? Cardiology consulted    COVID-19 infection  Follow vaccinated  Intermittently symptomatic? Currently maintaining good saturations on room air    Pneumonia?   Per CT  Procalcitonin only slightly elevated  Received a dose of Rocephin and azithromycin in the ED  Will monitor procalcitonin  Hold further antibiotic for now    DVT Prophylaxis: Lovenox  Diet: No diet orders on file  Code Status: Prior      Electronically signed by Annika Ryan MD on 1/21/2022 at 2:21 PM

## 2022-01-25 ENCOUNTER — TELEPHONE (OUTPATIENT)
Dept: CARDIOLOGY CLINIC | Age: 57
End: 2022-01-25

## 2022-01-25 LAB
BLOOD CULTURE, ROUTINE: NORMAL
CULTURE, BLOOD 2: NORMAL

## 2022-01-25 NOTE — TELEPHONE ENCOUNTER
----- Message from CHICHI Ceja - CNS sent at 1/25/2022  1:17 PM EST -----  Please call and schedule a follow up with me end of next week  Thanks  rg

## 2022-01-25 NOTE — TELEPHONE ENCOUNTER
Pt called back stating [facility] just called him and would like [facility] to call pt back.       Shaye Fleming   [phone]    Pls advise thank you

## 2022-01-27 NOTE — DISCHARGE SUMMARY
Hospital Medicine Discharge Summary    Patient ID: Chris Guevara      Patient's PCP: Ana María Alexandre, APRN - CNP    Admit Date: 1/21/2022     Discharge Date: 1/21/2022      Admitting Physician: No admitting provider for patient encounter. Discharge Physician: Ricky Delgado MD     Discharge Diagnoses: Active Hospital Problems    Diagnosis     Acute on chronic systolic (congestive) heart failure (HCC) [I50.23]        The patient was seen and examined on day of discharge and this discharge summary is in conjunction with any daily progress note from day of discharge. Hospital Course:       64 y.o. male with past medical history of nonischemic cardiomyopathy with EF of 15 to 20% as per echocardiogram September 2021, hypertension, CKD presents from home with above complaints. Patient appears to be a poor historian. To many questions patient answers can to just look at my chart. Patient was tested positive for COVID-19 on 1/17/2022. He is fully vaccinated with [employer] vaccine. He was seen by cardiology here at [facility] and was deemed to be fluid overloaded. He has had several visits to infusion center for IV Lasix. He stated that his lower extremity swelling has improved however his shortness of breath did not get better. After he spoke to cardiology again over the phone they recommended for him to come into the ER. At the ED today patient is maintaining good oxygen saturations to 98% on room air, CT chest negative for pulmonary embolus, has mild central airway disease with left basilar subsegmental pneumonia. His BNP today is 1763 which is less than 5931 on January 17. Overall appears to be near his baseline. Troponin is elevated at 0.03. Hospitalist has been consulted for admission.     Patient left AMA from ED    Physical Exam Performed:     BP (!) 144/84   Pulse 98   Temp 98.9 °F (37.2 °C) (Oral)   Resp 18   SpO2 98%       General appearance:  No apparent distress, appears stated age and cooperative. HEENT:  Normal cephalic, atraumatic without obvious deformity. Pupils equal, round, and reactive to light. Extra ocular muscles intact. Conjunctivae/corneas clear. Neck: Supple, with full range of motion. No jugular venous distention. Trachea midline. Respiratory:  Normal respiratory effort. Clear to auscultation, bilaterally without Rales/Wheezes/Rhonchi. Cardiovascular:  Regular rate and rhythm with normal S1/S2 without murmurs, rubs or gallops. Abdomen: Soft, non-tender, non-distended with normal bowel sounds. Musculoskeletal:  No clubbing, cyanosis or edema bilaterally. Full range of motion without deformity. Skin: Skin color, texture, turgor normal.  No rashes or lesions. Neurologic:  Neurovascularly intact without any focal sensory/motor deficits. Cranial nerves: II-XII intact, grossly non-focal.  Psychiatric:  Alert and oriented, thought content appropriate, normal insight  Capillary Refill: Brisk,< 3 seconds   Peripheral Pulses: +2 palpable, equal bilaterally       Labs: For convenience and continuity at follow-up the following most recent labs are provided:      CBC:    Lab Results   Component Value Date    WBC 4.6 01/21/2022    HGB 13.8 01/21/2022    HCT 41.4 01/21/2022     01/21/2022       Renal:    Lab Results   Component Value Date     01/21/2022    K 4.1 01/21/2022     01/21/2022    CO2 23 01/21/2022    BUN 17 01/21/2022    CREATININE 1.1 01/21/2022    CALCIUM 8.6 01/21/2022         Significant Diagnostic Studies    Radiology:   CT CHEST PULMONARY EMBOLISM W CONTRAST   Final Result   1. Negative for pulmonary embolus. 2. Mild central airways disease with left basilar subsegmental pneumonia. Gary Junior RECOMMENDATIONS:   Unavailable         XR CHEST PORTABLE   Final Result   Moderate to severe cardiomegaly                Consults:     None    Disposition: AMA    Condition at Discharge:  AMA    Discharge Instructions/Follow-up:      Code Status:  Prior Activity: activity as tolerated    Diet: AMA      Discharge Medications:     Discharge Medication List as of 1/21/2022  2:31 PM           Details   azithromycin (ZITHROMAX) 250 MG tablet Take 2 tablets (500 mg) on Day 1, followed by 1 tablet (250 mg) once daily on Days 2 through 5., Disp-1 packet, R-0Print              Details   furosemide (LASIX) 20 MG tablet Take 2 tablets by mouth daily And an extra 20 mg with increased sob, Disp-180 tablet, R-0Normal      sacubitril-valsartan (ENTRESTO) 24-26 MG per tablet Take 1 tablet by mouth 2 times daily, Disp-180 tablet, R-0Normal      atorvastatin (LIPITOR) 40 MG tablet Take 1 tablet by mouth daily, Disp-90 tablet, R-0Normal      dapagliflozin (FARXIGA) 10 MG tablet Take 1 tablet by mouth every morning, Disp-30 tablet, R-1Normal      vitamin D (ERGOCALCIFEROL) 1.25 MG (53494 UT) CAPS capsule Take 1 capsule by mouth once a week, Disp-12 capsule, R-1Normal      carvedilol (COREG) 25 MG tablet Take 1 tablet by mouth 2 times daily, Disp-180 tablet, R-3Normal             Time Spent on discharge is more than 15 minutes in the examination, evaluation, counseling and review of medications and discharge plan.       Signed:    Electronically signed by Patricia Briceno MD on 1/27/2022 at 2:48 PM

## 2022-02-08 ENCOUNTER — HOSPITAL ENCOUNTER (OUTPATIENT)
Age: 57
Discharge: HOME | End: 2022-02-08
Payer: COMMERCIAL

## 2022-02-08 ENCOUNTER — OFFICE VISIT (OUTPATIENT)
Dept: CARDIOLOGY CLINIC | Age: 57
End: 2022-02-08
Payer: COMMERCIAL

## 2022-02-08 VITALS
HEART RATE: 60 BPM | SYSTOLIC BLOOD PRESSURE: 124 MMHG | BODY MASS INDEX: 22.29 KG/M2 | DIASTOLIC BLOOD PRESSURE: 90 MMHG | WEIGHT: 155.7 LBS | HEIGHT: 70 IN | OXYGEN SATURATION: 100 %

## 2022-02-08 DIAGNOSIS — E78.5 HYPERLIPIDEMIA, UNSPECIFIED HYPERLIPIDEMIA TYPE: ICD-10-CM

## 2022-02-08 DIAGNOSIS — F17.200 SMOKER: ICD-10-CM

## 2022-02-08 DIAGNOSIS — I10 ESSENTIAL HYPERTENSION: ICD-10-CM

## 2022-02-08 DIAGNOSIS — E55.9 VITAMIN D DEFICIENCY: ICD-10-CM

## 2022-02-08 DIAGNOSIS — I50.22 CHRONIC SYSTOLIC CHF (CONGESTIVE HEART FAILURE) (HCC): Primary | ICD-10-CM

## 2022-02-08 DIAGNOSIS — I50.22 CHRONIC SYSTOLIC CHF (CONGESTIVE HEART FAILURE) (HCC): ICD-10-CM

## 2022-02-08 DIAGNOSIS — F10.10 ALCOHOL ABUSE: ICD-10-CM

## 2022-02-08 LAB
ANION GAP SERPL CALCULATED.3IONS-SCNC: 16 MMOL/L (ref 3–16)
BUN BLDV-MCNC: 12 MG/DL (ref 7–20)
CALCIUM SERPL-MCNC: 9.4 MG/DL (ref 8.3–10.6)
CHLORIDE BLD-SCNC: 105 MMOL/L (ref 99–110)
CO2: 20 MMOL/L (ref 21–32)
CREAT SERPL-MCNC: 1 MG/DL (ref 0.9–1.3)
GFR AFRICAN AMERICAN: >60
GFR NON-AFRICAN AMERICAN: >60
GLUCOSE BLD-MCNC: 94 MG/DL (ref 70–99)
POTASSIUM SERPL-SCNC: 5 MMOL/L (ref 3.5–5.1)
PRO-BNP: 4060 PG/ML (ref 0–124)
SODIUM BLD-SCNC: 141 MMOL/L (ref 136–145)

## 2022-02-08 PROCEDURE — 36415 COLL VENOUS BLD VENIPUNCTURE: CPT

## 2022-02-08 PROCEDURE — 99214 OFFICE O/P EST MOD 30 MIN: CPT | Performed by: CLINICAL NURSE SPECIALIST

## 2022-02-08 PROCEDURE — 80048 BASIC METABOLIC PNL TOTAL CA: CPT

## 2022-02-08 PROCEDURE — 3017F COLORECTAL CA SCREEN DOC REV: CPT | Performed by: CLINICAL NURSE SPECIALIST

## 2022-02-08 PROCEDURE — G8420 CALC BMI NORM PARAMETERS: HCPCS | Performed by: CLINICAL NURSE SPECIALIST

## 2022-02-08 PROCEDURE — 83880 ASSAY OF NATRIURETIC PEPTIDE: CPT

## 2022-02-08 PROCEDURE — G8484 FLU IMMUNIZE NO ADMIN: HCPCS | Performed by: CLINICAL NURSE SPECIALIST

## 2022-02-08 PROCEDURE — 4004F PT TOBACCO SCREEN RCVD TLK: CPT | Performed by: CLINICAL NURSE SPECIALIST

## 2022-02-08 PROCEDURE — G8427 DOCREV CUR MEDS BY ELIG CLIN: HCPCS | Performed by: CLINICAL NURSE SPECIALIST

## 2022-02-08 RX ORDER — SPIRONOLACTONE 25 MG/1
12.5 TABLET ORAL DAILY
Qty: 15 TABLET | Refills: 1 | Status: SHIPPED | OUTPATIENT
Start: 2022-02-08 | End: 2022-06-27 | Stop reason: SDUPTHER

## 2022-02-08 NOTE — PROGRESS NOTES
[facility]  Progress Note    Primary Care Doctor:  CHICHI Bartlett CNP    Chief Complaint   Patient presents with    Congestive Heart Failure     No complaints     Hypertension        History of Present Illness:  64 y.o. male with history of non compliance with medications, uncontrolled HTN, alcohol and smokes  2/24-27/2020 for acute sHF with LVEF of 15-20%, diuresed; uncontrolled HTN due to running out of insurance and stopping his medications; ABRAN on CKD  entresto 4/14/2021    I had the pleasure of seeing Mc Benjamin in follow up for sHF. He is ambulatory and by his self. He tested positive for covid the day I saw him and he went to the ER but left AMA. He is feeling much better today. Weight is down and shortness of breath resolved. He is taking all his medications and has even stopped alcohol for at least 5 weeks. He has been off work for a couple weeks due to shortness of breath. He did receive covid vaccine just not boosted. Past Medical History:   has a past medical history of Asthma. Surgical History:   has no past surgical history on file. Social History:   reports that he has been smoking cigars. He has never used smokeless tobacco. He reports current alcohol use of about 4.2 standard drinks of alcohol per week. He reports that he does not use drugs. Family History:   History reviewed. No pertinent family history. Home Medications:  Prior to Admission medications    Medication Sig Start Date End Date Taking?  Authorizing Provider   furosemide (LASIX) 20 MG tablet Take 2 tablets by mouth daily And an extra 20 mg with increased sob 1/18/22  Yes CHICHI Robles - CNS   sacubitril-valsartan (ENTRESTO) 24-26 MG per tablet Take 1 tablet by mouth 2 times daily 1/17/22  Yes CHICHI Robles - CNS   atorvastatin (LIPITOR) 40 MG tablet Take 1 tablet by mouth daily 1/17/22  Yes CHICHI Robles - CNS   dapagliflozin (FARXIGA) 10 MG tablet Take 1 tablet by mouth every morning 12/2/21  Yes CHICHI Leal   vitamin D (ERGOCALCIFEROL) 1.25 MG (26752 UT) CAPS capsule Take 1 capsule by mouth once a week 12/2/21  Yes CHICHI Leal   carvedilol (COREG) 25 MG tablet Take 1 tablet by mouth 2 times daily 10/21/21  Yes CHICHI Story CNP        Allergies:  Patient has no known allergies. Review of Systems:   · Constitutional: there has been no unanticipated weight loss. There's been no change in energy level, sleep pattern, or activity level. · Eyes: No visual changes or diplopia. No scleral icterus. · ENT: No Headaches, hearing loss or vertigo. No mouth sores or sore throat. · Cardiovascular: Reviewed in HPI  · Respiratory: No cough or wheezing, no sputum production. No hematemesis. · Gastrointestinal: No abdominal pain, appetite loss, blood in stools. No change in bowel or bladder habits. · Genitourinary: No dysuria, trouble voiding, or hematuria. · Musculoskeletal:  No gait disturbance, weakness or joint complaints. · Integumentary: No rash or pruritis. · Neurological: No headache, diplopia, change in muscle strength, numbness or tingling. No change in gait, balance, coordination, mood, affect, memory, mentation, behavior. · Psychiatric: No anxiety, no depression. · Endocrine: No malaise, fatigue or temperature intolerance. No excessive thirst, fluid intake, or urination. No tremor. · Hematologic/Lymphatic: No abnormal bruising or bleeding, blood clots or swollen lymph nodes. · Allergic/Immunologic: No nasal congestion or hives.     Physical Examination:    Vitals:    02/08/22 1221   BP: (!) 124/90   Site: Left Upper Arm   Position: Sitting   Cuff Size: Medium Adult   Pulse: 60   Weight: 155 lb 11.2 oz (70.6 kg)   Height: 5' 10\" (1.778 m)        Constitutional and General Appearance: Warm and dry, no apparent distress, normal coloration  HEENT:  Normocephalic, atraumatic  Respiratory:  · Normal excursion and expansion without use of accessory muscles  · Resp Auscultation: Normal breath sounds without dullness  Cardiovascular:  · The apical impulses not displaced  · Heart tones are crisp and normal  · JVP normal cm H2O  · Regular rate and rhythm  · Peripheral pulses are symmetrical and full  · There is no clubbing, cyanosis of the extremities.   · no edema  · Pedal Pulses: 2+ and equal   Abdomen:   · No masses or tenderness  · Liver/Spleen: No Abnormalities Noted  Neurological/Psychiatric:  · Alert and oriented in all spheres  · Moves all extremities well  · Exhibits normal gait balance and coordination  · No abnormalities of mood, affect, memory, mentation, or behavior are noted    Lab Data:    CBC:   Lab Results   Component Value Date    WBC 4.6 01/21/2022    WBC 6.0 01/17/2022    WBC 7.5 03/24/2021    RBC 4.27 01/21/2022    RBC 4.31 01/17/2022    RBC 4.35 03/24/2021    HGB 13.8 01/21/2022    HGB 14.0 01/17/2022    HGB 14.2 03/24/2021    HCT 41.4 01/21/2022    HCT 42.2 01/17/2022    HCT 42.5 03/24/2021    MCV 97.1 01/21/2022    MCV 97.9 01/17/2022    MCV 97.7 03/24/2021    RDW 15.6 01/21/2022    RDW 15.6 01/17/2022    RDW 14.9 03/24/2021     01/21/2022     01/17/2022     03/24/2021     BMP:  Lab Results   Component Value Date     01/21/2022     01/17/2022     12/29/2021    K 4.1 01/21/2022    K 4.4 01/17/2022    K 4.4 12/29/2021    K 4.8 12/02/2021    K 4.3 02/24/2020     01/21/2022     01/17/2022     12/29/2021    CO2 23 01/21/2022    CO2 22 01/17/2022    CO2 25 12/29/2021    BUN 17 01/21/2022    BUN 17 01/17/2022    BUN 15 12/29/2021    CREATININE 1.1 01/21/2022    CREATININE 1.1 01/17/2022    CREATININE 1.2 12/29/2021     BNP:   Lab Results   Component Value Date    PROBNP 1,763 01/21/2022    PROBNP 5,931 01/17/2022    PROBNP 2,538 12/29/2021     Cardiac Imaging:  Echo 9/14/2021   Summary   -Left ventricular cavity size is severely dilated with normal left ventricular wall thickness.   -Overall left ventricular systolic function appears severely reduced. Ejection fraction is visually estimated to be 15-20%. There is severe   diffuse hypokinesis. (Jimenez's EF: 18%, Heart model EF: 19%). -Global longitudinal strain: -5% (abnormal). -The right ventricle is moderately enlarged. Right ventricular systolic   function is mild-moderately reduced. 2/27/2020 Cardiac Cath : Dr Sancho Delcid  Anatomy:   LM-Normal   LAD-Normal  Cx-Normal   OM- Normal   RCA-Dominant  RPDA- Normal      LVEDP- 10     Impression  ~Coronary Angiography w/ Normal Coronaries  ~Normal LVEDP  ~Non ischemic Cardiomyopathy    Echo 2/25/2020  Summary   Left ventricular cavity size is mild to moderately dilated. There is mild concentric left ventricular hypertrophy. Overall, left ventricular systolic function is severely depressed. Ejection fraction is visually estimated to be 15-20%. (Jimenez's=19%)   Severe global hypokinesis. Grade II diastolic dysfunction with elevated LV filling pressures. Mild to moderate mitral regurgitation. The left atrium is mildly dilated. Mild to moderate tricuspid regurgitation with PASP of 25 mmHg    Assessment:    1. Chronic systolic heart failure (HCC) on arni, BB and farxiga   2. Essential hypertension    3. Vitamin d deficiency   4. Smoker    5. Alcohol abuse   6. Hyperlipidemia      Plan:   1. Start aldactone (spironolactone) 12.5 mg once a day (half of a pill)  2. Increase entresto to full pill 24-26 mg twice a day  3. Check blood work today and in 2 weeks  4. Continue all other current medications  5. RTO in 3 weeks      I appreciate the opportunity of cooperating in the care of this individual.    CHICHI Burciaga Mai - CNS, CNS, 2/8/2022, 12:26 PM    QUALITY MEASURES  1. Tobacco Cessation Counseling: Yes  2. Retake of BP if >140/90:   NA  3. Documentation to PCP/referring for new patient:  Sent to PCP at close of office visit  4.  CAD patient on anti-platelet: NA  5. CAD patient on STATIN therapy:  Yes  6.  Patient with CHF and aFib on anticoagulation:  NA

## 2022-02-08 NOTE — PATIENT INSTRUCTIONS
1.  Start aldactone (spironolactone) 12.5 mg once a day (half of a pill)  2. Increase entresto to full pill 24-26 mg twice a day  3. Check blood work today and in 2 weeks  4. Continue all other current medications  5.   RTO in 3 weeks

## 2022-02-09 ENCOUNTER — TELEPHONE (OUTPATIENT)
Dept: CARDIOLOGY CLINIC | Age: 57
End: 2022-02-09

## 2022-02-09 NOTE — TELEPHONE ENCOUNTER
----- Message from CHICHI Jama - CNS sent at 2/9/2022  8:25 AM EST -----  Fluid level is back up  I want him to increase his lasix to 40 mg twice a day for a week while his other changes that I made in the office kick in  thanks

## 2022-03-01 ENCOUNTER — TELEPHONE (OUTPATIENT)
Dept: INTERNAL MEDICINE CLINIC | Age: 57
End: 2022-03-01

## 2022-03-01 NOTE — TELEPHONE ENCOUNTER
Nope I did not! And I won't change the schedule to get this leroy in earlier. He tested positive for covid, left AMA and has CHF. I don't even see a reason to tenorio his appointment.       Key Galvan MD  FACP

## 2022-03-01 NOTE — TELEPHONE ENCOUNTER
Please Advise    Pt would like to know if he can be seen sooner then May said Ubaldo Sevilla talk to his heart Doctor and he said he would take him as a Pt

## 2022-03-04 ENCOUNTER — OFFICE VISIT (OUTPATIENT)
Dept: CARDIOLOGY CLINIC | Age: 57
End: 2022-03-04
Payer: COMMERCIAL

## 2022-03-04 ENCOUNTER — HOSPITAL ENCOUNTER (OUTPATIENT)
Age: 57
Discharge: HOME | End: 2022-03-04
Payer: COMMERCIAL

## 2022-03-04 VITALS
HEART RATE: 54 BPM | OXYGEN SATURATION: 99 % | DIASTOLIC BLOOD PRESSURE: 80 MMHG | WEIGHT: 155 LBS | BODY MASS INDEX: 22.19 KG/M2 | SYSTOLIC BLOOD PRESSURE: 120 MMHG | HEIGHT: 70 IN

## 2022-03-04 DIAGNOSIS — F17.200 SMOKER: ICD-10-CM

## 2022-03-04 DIAGNOSIS — F10.10 ALCOHOL ABUSE: ICD-10-CM

## 2022-03-04 DIAGNOSIS — I50.22 CHRONIC SYSTOLIC HEART FAILURE (HCC): Primary | ICD-10-CM

## 2022-03-04 DIAGNOSIS — I10 ESSENTIAL HYPERTENSION: ICD-10-CM

## 2022-03-04 DIAGNOSIS — E55.9 VITAMIN D DEFICIENCY: ICD-10-CM

## 2022-03-04 DIAGNOSIS — I50.22 CHRONIC SYSTOLIC HEART FAILURE (HCC): ICD-10-CM

## 2022-03-04 DIAGNOSIS — E78.5 HYPERLIPIDEMIA, UNSPECIFIED HYPERLIPIDEMIA TYPE: ICD-10-CM

## 2022-03-04 LAB
ANION GAP SERPL CALCULATED.3IONS-SCNC: 17 MMOL/L (ref 3–16)
BUN BLDV-MCNC: 16 MG/DL (ref 7–20)
CALCIUM SERPL-MCNC: 9.8 MG/DL (ref 8.3–10.6)
CHLORIDE BLD-SCNC: 103 MMOL/L (ref 99–110)
CO2: 21 MMOL/L (ref 21–32)
CREAT SERPL-MCNC: 1.1 MG/DL (ref 0.9–1.3)
GFR AFRICAN AMERICAN: >60
GFR NON-AFRICAN AMERICAN: >60
GLUCOSE BLD-MCNC: 107 MG/DL (ref 70–99)
POTASSIUM SERPL-SCNC: 4.8 MMOL/L (ref 3.5–5.1)
PRO-BNP: 654 PG/ML (ref 0–124)
SODIUM BLD-SCNC: 141 MMOL/L (ref 136–145)

## 2022-03-04 PROCEDURE — 4004F PT TOBACCO SCREEN RCVD TLK: CPT | Performed by: CLINICAL NURSE SPECIALIST

## 2022-03-04 PROCEDURE — 99214 OFFICE O/P EST MOD 30 MIN: CPT | Performed by: CLINICAL NURSE SPECIALIST

## 2022-03-04 PROCEDURE — 83880 ASSAY OF NATRIURETIC PEPTIDE: CPT

## 2022-03-04 PROCEDURE — G8484 FLU IMMUNIZE NO ADMIN: HCPCS | Performed by: CLINICAL NURSE SPECIALIST

## 2022-03-04 PROCEDURE — 3017F COLORECTAL CA SCREEN DOC REV: CPT | Performed by: CLINICAL NURSE SPECIALIST

## 2022-03-04 PROCEDURE — G8420 CALC BMI NORM PARAMETERS: HCPCS | Performed by: CLINICAL NURSE SPECIALIST

## 2022-03-04 PROCEDURE — 36415 COLL VENOUS BLD VENIPUNCTURE: CPT

## 2022-03-04 PROCEDURE — 80048 BASIC METABOLIC PNL TOTAL CA: CPT

## 2022-03-04 PROCEDURE — G8427 DOCREV CUR MEDS BY ELIG CLIN: HCPCS | Performed by: CLINICAL NURSE SPECIALIST

## 2022-03-04 NOTE — PATIENT INSTRUCTIONS
1. Check blood work today  2. Continue current medications  3. Stay off work until I see you March 30th  4.   Check echo with appt march 30th

## 2022-03-04 NOTE — PROGRESS NOTES
[facility]  Progress Note    Primary Care Doctor:  No primary care provider on file. Chief Complaint   Patient presents with    Follow-up    Congestive Heart Failure        History of Present Illness:  64 y.o. male with history of non compliance with medications, uncontrolled HTN, alcohol and smokes  2/24-27/2020 for acute sHF with LVEF of 15-20%, diuresed; uncontrolled HTN due to running out of insurance and stopping his medications; ABRAN on CKD  entresto 4/14/2021    I had the pleasure of seeing Elsi Going in follow up for sHF. He is ambulatory and by his self. He is feeling much better. No shortness of breath, palpitations, chest pain or edema/bloating. He has been taking all of his medications. He is going to stay off work until his follow up visit in March. He finally has a PCP. He admits to drinking only a couple beers since Jan.    Past Medical History:   has a past medical history of Asthma. Surgical History:   has no past surgical history on file. Social History:   reports that he has been smoking cigars. He has never used smokeless tobacco. He reports current alcohol use of about 4.2 standard drinks of alcohol per week. He reports that he does not use drugs. Family History:   History reviewed. No pertinent family history. Home Medications:  Prior to Admission medications    Medication Sig Start Date End Date Taking?  Authorizing Provider   spironolactone (ALDACTONE) 25 MG tablet Take 0.5 tablets by mouth daily 2/8/22  Yes CHICHI Aguilar   furosemide (LASIX) 20 MG tablet Take 2 tablets by mouth daily And an extra 20 mg with increased sob 1/18/22  Yes CHICHI Aguilar   sacubitril-valsartan (ENTRESTO) 24-26 MG per tablet Take 1 tablet by mouth 2 times daily 1/17/22  Yes CHICHI Aguilar   atorvastatin (LIPITOR) 40 MG tablet Take 1 tablet by mouth daily 1/17/22  Yes CHICHI Aguilar   dapagliflozin (FARXIGA) 10 MG tablet Take 1 tablet by mouth every morning 12/2/21  Yes CHICHI Hernández   vitamin D (ERGOCALCIFEROL) 1.25 MG (07321 UT) CAPS capsule Take 1 capsule by mouth once a week 12/2/21  Yes CHICHI Hernández   carvedilol (COREG) 25 MG tablet Take 1 tablet by mouth 2 times daily 10/21/21  Yes CHICHI Sampson CNP        Allergies:  Patient has no known allergies. Review of Systems:   · Constitutional: there has been no unanticipated weight loss. There's been no change in energy level, sleep pattern, or activity level. · Eyes: No visual changes or diplopia. No scleral icterus. · ENT: No Headaches, hearing loss or vertigo. No mouth sores or sore throat. · Cardiovascular: Reviewed in HPI  · Respiratory: No cough or wheezing, no sputum production. No hematemesis. · Gastrointestinal: No abdominal pain, appetite loss, blood in stools. No change in bowel or bladder habits. · Genitourinary: No dysuria, trouble voiding, or hematuria. · Musculoskeletal:  No gait disturbance, weakness or joint complaints. · Integumentary: No rash or pruritis. · Neurological: No headache, diplopia, change in muscle strength, numbness or tingling. No change in gait, balance, coordination, mood, affect, memory, mentation, behavior. · Psychiatric: No anxiety, no depression. · Endocrine: No malaise, fatigue or temperature intolerance. No excessive thirst, fluid intake, or urination. No tremor. · Hematologic/Lymphatic: No abnormal bruising or bleeding, blood clots or swollen lymph nodes. · Allergic/Immunologic: No nasal congestion or hives.     Physical Examination:    Vitals:    03/04/22 1303   BP: 120/80   Site: Left Upper Arm   Position: Sitting   Cuff Size: Medium Adult   Pulse: 54   SpO2: 99%   Weight: 155 lb (70.3 kg)   Height: 5' 10\" (1.778 m)        Constitutional and General Appearance: Warm and dry, no apparent distress, normal coloration  HEENT:  Normocephalic, atraumatic  Respiratory:  · Normal excursion and expansion without use of accessory muscles  · Resp Auscultation: Normal breath sounds without dullness  Cardiovascular:  · The apical impulses not displaced  · Heart tones are crisp and normal  · JVP normal cm H2O  · Regular rate and rhythm  · Peripheral pulses are symmetrical and full  · There is no clubbing, cyanosis of the extremities.   · no edema  · Pedal Pulses: 2+ and equal   Abdomen:   · No masses or tenderness  · Liver/Spleen: No Abnormalities Noted  Neurological/Psychiatric:  · Alert and oriented in all spheres  · Moves all extremities well  · Exhibits normal gait balance and coordination  · No abnormalities of mood, affect, memory, mentation, or behavior are noted    Lab Data:    CBC:   Lab Results   Component Value Date    WBC 4.6 01/21/2022    WBC 6.0 01/17/2022    WBC 7.5 03/24/2021    RBC 4.27 01/21/2022    RBC 4.31 01/17/2022    RBC 4.35 03/24/2021    HGB 13.8 01/21/2022    HGB 14.0 01/17/2022    HGB 14.2 03/24/2021    HCT 41.4 01/21/2022    HCT 42.2 01/17/2022    HCT 42.5 03/24/2021    MCV 97.1 01/21/2022    MCV 97.9 01/17/2022    MCV 97.7 03/24/2021    RDW 15.6 01/21/2022    RDW 15.6 01/17/2022    RDW 14.9 03/24/2021     01/21/2022     01/17/2022     03/24/2021     BMP:  Lab Results   Component Value Date     02/08/2022     01/21/2022     01/17/2022    K 5.0 02/08/2022    K 4.1 01/21/2022    K 4.4 01/17/2022    K 4.4 12/29/2021    K 4.3 02/24/2020     02/08/2022     01/21/2022     01/17/2022    CO2 20 02/08/2022    CO2 23 01/21/2022    CO2 22 01/17/2022    BUN 12 02/08/2022    BUN 17 01/21/2022    BUN 17 01/17/2022    CREATININE 1.0 02/08/2022    CREATININE 1.1 01/21/2022    CREATININE 1.1 01/17/2022     BNP:   Lab Results   Component Value Date    PROBNP 4,060 02/08/2022    PROBNP 1,763 01/21/2022    PROBNP 5,931 01/17/2022     Cardiac Imaging:  Echo 9/14/2021   Summary   -Left ventricular cavity size is severely dilated with normal left   ventricular wall thickness.   -Overall left ventricular systolic function appears severely reduced. Ejection fraction is visually estimated to be 15-20%. There is severe   diffuse hypokinesis. (Jimenez's EF: 18%, Heart model EF: 19%). -Global longitudinal strain: -5% (abnormal). -The right ventricle is moderately enlarged. Right ventricular systolic   function is mild-moderately reduced. 2/27/2020 Cardiac Cath : Dr Manuel Pryor  Anatomy:   LM-Normal   LAD-Normal  Cx-Normal   OM- Normal   RCA-Dominant  RPDA- Normal      LVEDP- 10     Impression  ~Coronary Angiography w/ Normal Coronaries  ~Normal LVEDP  ~Non ischemic Cardiomyopathy    Echo 2/25/2020  Summary   Left ventricular cavity size is mild to moderately dilated. There is mild concentric left ventricular hypertrophy. Overall, left ventricular systolic function is severely depressed. Ejection fraction is visually estimated to be 15-20%. (Jimenez's=19%)   Severe global hypokinesis. Grade II diastolic dysfunction with elevated LV filling pressures. Mild to moderate mitral regurgitation. The left atrium is mildly dilated. Mild to moderate tricuspid regurgitation with PASP of 25 mmHg    Assessment:    1. Chronic systolic heart failure (HCC) on arni, BB and farxiga   2. Essential hypertension    3. Vitamin d deficiency   4. Smoker    5. Alcohol abuse   6. Hyperlipidemia      Plan:   1. Check blood work today  2. Continue current medications  3. Stay off work until I see you March 30th  4. Check echo with appt march 30th    I appreciate the opportunity of cooperating in the care of this individual.    CHICHI Thomas - CNS, CNS, 3/4/2022, 1:10 PM    QUALITY MEASURES  1. Tobacco Cessation Counseling: Yes  2. Retake of BP if >140/90:   NA  3. Documentation to PCP/referring for new patient:  Sent to PCP at close of office visit  4. CAD patient on anti-platelet: NA  5. CAD patient on STATIN therapy:  Yes  6.  Patient with CHF and aFib on anticoagulation:  NA

## 2022-03-07 ENCOUNTER — TELEPHONE (OUTPATIENT)
Dept: CARDIOLOGY CLINIC | Age: 57
End: 2022-03-07

## 2022-03-07 NOTE — TELEPHONE ENCOUNTER
----- Message from CHICHI Hernández sent at 3/7/2022  8:38 AM EST -----  Tell him Im impressed and happy with his labs  His fluid level went from 4060 to 654  Keep smiling  Thanks [facility]  Colorectal Surgery  Discharge Summary      Patient Name: Sushant Cruz  MRN: [id number]  Admission Date: 7/3/2017  Hospital Length of Stay: 3 days  Discharge Date and Time: 7/6/17  Attending Physician: Dr. Schwartz   Discharging Provider: Aurora Coreas NP  Primary Care Provider: Bentley Lyon MD     HPI: This is an 88-year-old gentleman with finding consistent with colon adenocarcinoma on a diagnostic colonoscopy for anemia.  Metastatic workup was negative. Given these findings he was consented for a Right hemicolectomy.      Procedure(s) (LRB):  COLECTOMY-RIGHT (N/A)  CYSTOSCOPY  REPAIR-HERNIA-INGUINAL  (Left)     Hospital Course: Underwent above surgery 7/3/17.  Once bowel function resumed diet was slowly advanced.  On discharge day he is dewey low fiber diet, inc line healing well, positive for bm with flatus, adequate pain control with oral meds, amb in thomas without diff and AF, VS stable, discharge home and fu 2 weeks with Dr. Schwartz         Significant Diagnostic Studies: Labs:   BMP:   Recent Labs  Lab 07/05/17  0447 07/06/17  0416   GLU 94 109   * 136   K 4.1 4.1    111*   CO2 20* 22*   BUN 17 15   CREATININE 0.9 0.8   CALCIUM 9.1 9.1    and CBC   Recent Labs  Lab 07/05/17  0447 07/06/17  0416   WBC 5.97 6.03   HGB 10.6* 10.2*   HCT 32.6* 31.4*    206       Pending Diagnostic Studies:     None        Final Active Diagnoses:    Diagnosis Date Noted POA    PRINCIPAL PROBLEM:  Cancer of right colon [C18.2] 07/03/2017 Yes      Problems Resolved During this Admission:    Diagnosis Date Noted Date Resolved POA      Discharged Condition: good    Disposition: Home or Self Care    Follow Up:  Follow-up Information     Robin Schwartz MD In 2 weeks.    Specialty:  Colon and Rectal Surgery  Contact information:  [address] [place] [address] [phone]                 Patient Instructions:     Diet general   Order Comments: No fresh fruit or fresh vegetables, no  nuts, popcorn, grapes or raisins for 2 weeks low fiber diet     Lifting restrictions   Order Comments: No lifting greater then 5 pounds  May shower, no tub bath     Call MD for:  persistent nausea and vomiting or diarrhea     Call MD for:  severe uncontrolled pain     Call MD for:  redness, tenderness, or signs of infection (pain, swelling, redness, odor or green/yellow discharge around incision site)     Call MD for:  difficulty breathing or increased cough     Call MD for:  severe persistent headache     Call MD for:  worsening rash     Call MD for:  persistent dizziness, light-headedness, or visual disturbances     Call MD for:  increased confusion or weakness     Call MD for:   Order Comments: Call for temp above 101       Medications:  Reconciled Home Medications:   Discharge Medication List as of 7/6/2017  9:45 AM      START taking these medications    Details   hydrocodone-acetaminophen 5-325mg (NORCO) 5-325 mg per tablet Take 1 tablet by mouth every 4 (four) hours as needed., Starting Thu 7/6/2017, Print         CONTINUE these medications which have NOT CHANGED    Details   cyanocobalamin 2000 MCG tablet Take 2,000 mcg by mouth once daily., Historical Med      DABIGATRAN ETEXILATE MESYLATE (PRADAXA ORAL) Take by mouth., Historical Med      ferrous sulfate 325 mg (65 mg iron) Tab tablet Take 325 mg by mouth 3 (three) times daily., Historical Med      metoprolol succinate (TOPROL-XL) 25 MG 24 hr tablet TK 1 T PO QD, Historical Med         STOP taking these medications       metronidazole (FLAGYL) 250 MG tablet Comments:   Reason for Stopping:               Aurora Coreas NP  Colorectal Surgery  [facility]

## 2022-03-09 ENCOUNTER — TELEPHONE (OUTPATIENT)
Dept: CARDIOLOGY CLINIC | Age: 57
End: 2022-03-09

## 2022-03-09 NOTE — TELEPHONE ENCOUNTER
Spoke to patient the [facility] paperwork we received is for COVID. Paperwork is in your folder.

## 2022-03-14 ENCOUNTER — TELEPHONE (OUTPATIENT)
Dept: CARDIOLOGY CLINIC | Age: 57
End: 2022-03-14

## 2022-03-14 NOTE — TELEPHONE ENCOUNTER
RAI.  Patient dropped off [facility] paperwork to be completed. Gave to Riley.    joseph

## 2022-03-14 NOTE — TELEPHONE ENCOUNTER
Talked with pt and let him know that  I would give to [facility] [place] that her MA would fill out rest of paper work.

## 2022-03-21 ENCOUNTER — TELEPHONE (OUTPATIENT)
Dept: CARDIOLOGY CLINIC | Age: 57
End: 2022-03-21

## 2022-03-23 ENCOUNTER — TELEPHONE (OUTPATIENT)
Dept: CARDIOLOGY CLINIC | Age: 57
End: 2022-03-23

## 2022-03-23 NOTE — TELEPHONE ENCOUNTER
Pt needing the office notes form 3/4/22, faxed to [employer], [phone], for his FMLA. Needs faxed today.

## 2022-03-30 ENCOUNTER — HOSPITAL ENCOUNTER (OUTPATIENT)
Dept: NON INVASIVE DIAGNOSTICS | Age: 57
Discharge: HOME | End: 2022-03-30
Payer: COMMERCIAL

## 2022-03-30 ENCOUNTER — OFFICE VISIT (OUTPATIENT)
Dept: CARDIOLOGY CLINIC | Age: 57
End: 2022-03-30
Payer: COMMERCIAL

## 2022-03-30 VITALS
HEIGHT: 70 IN | SYSTOLIC BLOOD PRESSURE: 130 MMHG | OXYGEN SATURATION: 97 % | DIASTOLIC BLOOD PRESSURE: 74 MMHG | BODY MASS INDEX: 22.85 KG/M2 | WEIGHT: 159.6 LBS | HEART RATE: 64 BPM

## 2022-03-30 DIAGNOSIS — I50.22 CHRONIC SYSTOLIC HEART FAILURE (HCC): Primary | ICD-10-CM

## 2022-03-30 DIAGNOSIS — I10 ESSENTIAL HYPERTENSION: ICD-10-CM

## 2022-03-30 DIAGNOSIS — F10.10 ALCOHOL ABUSE: ICD-10-CM

## 2022-03-30 DIAGNOSIS — E55.9 VITAMIN D DEFICIENCY: ICD-10-CM

## 2022-03-30 DIAGNOSIS — I50.22 CHRONIC SYSTOLIC HEART FAILURE (HCC): ICD-10-CM

## 2022-03-30 DIAGNOSIS — E78.5 HYPERLIPIDEMIA, UNSPECIFIED HYPERLIPIDEMIA TYPE: ICD-10-CM

## 2022-03-30 LAB
LV EF: 18 %
LVEF MODALITY: NORMAL

## 2022-03-30 PROCEDURE — 3017F COLORECTAL CA SCREEN DOC REV: CPT | Performed by: CLINICAL NURSE SPECIALIST

## 2022-03-30 PROCEDURE — G8484 FLU IMMUNIZE NO ADMIN: HCPCS | Performed by: CLINICAL NURSE SPECIALIST

## 2022-03-30 PROCEDURE — G8427 DOCREV CUR MEDS BY ELIG CLIN: HCPCS | Performed by: CLINICAL NURSE SPECIALIST

## 2022-03-30 PROCEDURE — 4004F PT TOBACCO SCREEN RCVD TLK: CPT | Performed by: CLINICAL NURSE SPECIALIST

## 2022-03-30 PROCEDURE — 99214 OFFICE O/P EST MOD 30 MIN: CPT | Performed by: CLINICAL NURSE SPECIALIST

## 2022-03-30 PROCEDURE — 93306 TTE W/DOPPLER COMPLETE: CPT

## 2022-03-30 PROCEDURE — G8420 CALC BMI NORM PARAMETERS: HCPCS | Performed by: CLINICAL NURSE SPECIALIST

## 2022-03-30 NOTE — PATIENT INSTRUCTIONS
1.  Increase entresto 49-51 mg twice a day  2. Ok to return to work  3. Continue current medications  4. Check blood work in 1 month  5. RTO in 3 months with Dr Yelena Brumfield  6.   Seriously think about a defibrillator

## 2022-03-30 NOTE — PROGRESS NOTES
tablet by mouth daily 1/17/22  Yes CHICHI Bernal   dapagliflozin (FARXIGA) 10 MG tablet Take 1 tablet by mouth every morning 12/2/21  Yes CHICHI Bernal   vitamin D (ERGOCALCIFEROL) 1.25 MG (63081 UT) CAPS capsule Take 1 capsule by mouth once a week 12/2/21  Yes CHICHI Bernal   carvedilol (COREG) 25 MG tablet Take 1 tablet by mouth 2 times daily 10/21/21  Yes CHICHI Olguin - CNP        Allergies:  Patient has no known allergies. Review of Systems:   · Constitutional: there has been no unanticipated weight loss. There's been no change in energy level, sleep pattern, or activity level. · Eyes: No visual changes or diplopia. No scleral icterus. · ENT: No Headaches, hearing loss or vertigo. No mouth sores or sore throat. · Cardiovascular: Reviewed in HPI  · Respiratory: No cough or wheezing, no sputum production. No hematemesis. · Gastrointestinal: No abdominal pain, appetite loss, blood in stools. No change in bowel or bladder habits. · Genitourinary: No dysuria, trouble voiding, or hematuria. · Musculoskeletal:  No gait disturbance, weakness or joint complaints. · Integumentary: No rash or pruritis. · Neurological: No headache, diplopia, change in muscle strength, numbness or tingling. No change in gait, balance, coordination, mood, affect, memory, mentation, behavior. · Psychiatric: No anxiety, no depression. · Endocrine: No malaise, fatigue or temperature intolerance. No excessive thirst, fluid intake, or urination. No tremor. · Hematologic/Lymphatic: No abnormal bruising or bleeding, blood clots or swollen lymph nodes. · Allergic/Immunologic: No nasal congestion or hives.     Physical Examination:    Vitals:    03/30/22 1424   BP: 130/74   Site: Left Upper Arm   Position: Sitting   Cuff Size: Medium Adult   Pulse: 64   SpO2: 97%   Weight: 159 lb 9.6 oz (72.4 kg)   Height: 5' 10\" (1.778 m)        Constitutional and General Appearance: Warm and dry, no apparent distress, normal coloration  HEENT:  Normocephalic, atraumatic  Respiratory:  · Normal excursion and expansion without use of accessory muscles  · Resp Auscultation: Normal breath sounds without dullness  Cardiovascular:  · The apical impulses not displaced  · Heart tones are crisp and normal  · JVP normal cm H2O  · Regular rate and rhythm  · Peripheral pulses are symmetrical and full  · There is no clubbing, cyanosis of the extremities.   · no edema  · Pedal Pulses: 2+ and equal   Abdomen:   · No masses or tenderness  · Liver/Spleen: No Abnormalities Noted  Neurological/Psychiatric:  · Alert and oriented in all spheres  · Moves all extremities well  · Exhibits normal gait balance and coordination  · No abnormalities of mood, affect, memory, mentation, or behavior are noted    Lab Data:    CBC:   Lab Results   Component Value Date    WBC 4.6 01/21/2022    WBC 6.0 01/17/2022    WBC 7.5 03/24/2021    RBC 4.27 01/21/2022    RBC 4.31 01/17/2022    RBC 4.35 03/24/2021    HGB 13.8 01/21/2022    HGB 14.0 01/17/2022    HGB 14.2 03/24/2021    HCT 41.4 01/21/2022    HCT 42.2 01/17/2022    HCT 42.5 03/24/2021    MCV 97.1 01/21/2022    MCV 97.9 01/17/2022    MCV 97.7 03/24/2021    RDW 15.6 01/21/2022    RDW 15.6 01/17/2022    RDW 14.9 03/24/2021     01/21/2022     01/17/2022     03/24/2021     BMP:  Lab Results   Component Value Date     03/04/2022     02/08/2022     01/21/2022    K 4.8 03/04/2022    K 5.0 02/08/2022    K 4.1 01/21/2022    K 4.4 01/17/2022    K 4.3 02/24/2020     03/04/2022     02/08/2022     01/21/2022    CO2 21 03/04/2022    CO2 20 02/08/2022    CO2 23 01/21/2022    BUN 16 03/04/2022    BUN 12 02/08/2022    BUN 17 01/21/2022    CREATININE 1.1 03/04/2022    CREATININE 1.0 02/08/2022    CREATININE 1.1 01/21/2022     BNP:   Lab Results   Component Value Date    PROBNP 654 03/04/2022    PROBNP 4,060 02/08/2022    PROBNP 1,763 01/21/2022 Cardiac Imaging:  Echo 3/30/22  Left ventricular size is severely increased. Severely dilated left ventricle. increased left ventricular wall thickness. Severely decreased ejection fraction. EF is estimated to be 15-20% GLS= -5  Moderate mitral regurgitation is present. The right ventricle is enlarged. Right ventricular systolic function is mildly reduced . TAPSE= 1.99 RV S'= 9.99  Mild tricuspid regurgitation. Systolic pulmonary artery pressure (SPAP) estimated at 21 mmHg (RA pressure 3 mmHg). Echo 9/14/2021   Summary   -Left ventricular cavity size is severely dilated with normal left   ventricular wall thickness.   -Overall left ventricular systolic function appears severely reduced. Ejection fraction is visually estimated to be 15-20%. There is severe   diffuse hypokinesis. (Jimenez's EF: 18%, Heart model EF: 19%). -Global longitudinal strain: -5% (abnormal). -The right ventricle is moderately enlarged. Right ventricular systolic   function is mild-moderately reduced. 2/27/2020 Cardiac Cath : Dr Monique Amaya  Anatomy:   LM-Normal   LAD-Normal  Cx-Normal   OM- Normal   RCA-Dominant  RPDA- Normal      LVEDP- 10     Impression  ~Coronary Angiography w/ Normal Coronaries  ~Normal LVEDP  ~Non ischemic Cardiomyopathy    Echo 2/25/2020  Summary   Left ventricular cavity size is mild to moderately dilated. There is mild concentric left ventricular hypertrophy. Overall, left ventricular systolic function is severely depressed. Ejection fraction is visually estimated to be 15-20%. (Jimenez's=19%)   Severe global hypokinesis. Grade II diastolic dysfunction with elevated LV filling pressures. Mild to moderate mitral regurgitation. The left atrium is mildly dilated. Mild to moderate tricuspid regurgitation with PASP of 25 mmHg    Assessment:    1. Chronic systolic heart failure (HCC) on arni, BB, aldosterone antagonist and farxiga   2. Essential hypertension    3. Vitamin d deficiency   4.  Smoker 5. Alcohol abuse   6. Hyperlipidemia      Plan:   1. Increase entresto 49-51 mg twice a day  2. Ok to return to work  3. Continue current medications  4. Check blood work in 1 month  5. RTO in 3 months with Dr Claire Leroy  6. Seriously think about a defibrillator. He has declined this several times due to the size of the device. May consider adding digoxin at next visit  Request him to see Dr Claire Leroy next visit for any other suggestions    I appreciate the opportunity of cooperating in the care of this individual.    Ad Bond, CHICHI - CNS, CNS, 3/30/2022, 2:34 PM    QUALITY MEASURES  1. Tobacco Cessation Counseling: Yes  2. Retake of BP if >140/90:   NA  3. Documentation to PCP/referring for new patient:  Sent to PCP at close of office visit  4. CAD patient on anti-platelet: NA  5. CAD patient on STATIN therapy:  Yes  6.  Patient with CHF and aFib on anticoagulation:  NA

## 2022-03-30 NOTE — LETTER
[address] [place] [address] Cardiology [place] [address] [address] [address]  Phone: [phone]  Fax: [phone] CHICHI Stratton        March 30, 2022     Patient: Laurie Membreno   YOB: 1965   Date of Visit: 3/30/2022       To Whom It May Concern: It is my medical opinion that Laurie Membreno return to work on 4/4/2022 without any restrictions. If you have any questions or concerns, please don't hesitate to call.     Sincerely,        CHICHI Caal

## 2022-05-05 RX ORDER — FUROSEMIDE 20 MG/1
40 TABLET ORAL DAILY
Qty: 180 TABLET | Refills: 0 | Status: SHIPPED | OUTPATIENT
Start: 2022-05-05

## 2022-05-05 RX ORDER — CARVEDILOL 25 MG/1
25 TABLET ORAL 2 TIMES DAILY
Qty: 180 TABLET | Refills: 3 | Status: SHIPPED | OUTPATIENT
Start: 2022-05-05

## 2022-05-05 NOTE — TELEPHONE ENCOUNTER
Medication Refill    Medication needing refilled:  dapagliflozin (FARXIGA)  furosemide (LASIX)  carvedilol (COREG)    Dosage of the medication:  10 MG tablet   10 MG tablet  25 MG tablet     How are you taking this medication (QD, BID, TID, QID, PRN):  Take 1 tablet by mouth every morning  Take 2 tablets by mouth daily And an extra 20 mg with increased sob  Take 1 tablet by mouth 2 times daily    30 or 90 day supply called in:  30 tablet  30 tablet  180 tablet    When will you run out of your medication:  Patient is currently out    Which Pharmacy are we sending the medication to?:    [facility], [address] [place] [address], [place] [address]   Phone:  [phone]  Fax:  [phone]      Patient ask that someone can call him and let him know when these have been sent to the pharmacy.    Patient can be reached at [phone]

## 2022-05-05 NOTE — TELEPHONE ENCOUNTER
Prescription refill    Last OV:03/30/2022    Last Refill:12/02/2021    Labs:03/04/2022    Future Appt:06/27/2022

## 2022-05-25 ENCOUNTER — OFFICE VISIT (OUTPATIENT)
Dept: INTERNAL MEDICINE CLINIC | Age: 57
End: 2022-05-25
Payer: COMMERCIAL

## 2022-05-25 VITALS
WEIGHT: 158 LBS | SYSTOLIC BLOOD PRESSURE: 132 MMHG | HEART RATE: 58 BPM | BODY MASS INDEX: 22.67 KG/M2 | TEMPERATURE: 97.7 F | OXYGEN SATURATION: 98 % | DIASTOLIC BLOOD PRESSURE: 82 MMHG

## 2022-05-25 DIAGNOSIS — U07.1 COVID: ICD-10-CM

## 2022-05-25 DIAGNOSIS — Z12.11 COLON CANCER SCREENING: ICD-10-CM

## 2022-05-25 DIAGNOSIS — Z13.1 DIABETES MELLITUS SCREENING: ICD-10-CM

## 2022-05-25 DIAGNOSIS — Z12.5 PROSTATE CANCER SCREENING: ICD-10-CM

## 2022-05-25 DIAGNOSIS — Z11.4 SCREENING FOR HUMAN IMMUNODEFICIENCY VIRUS: ICD-10-CM

## 2022-05-25 DIAGNOSIS — F17.200 COMPULSIVE TOBACCO USER SYNDROME: ICD-10-CM

## 2022-05-25 DIAGNOSIS — Z11.59 NEED FOR HEPATITIS C SCREENING TEST: ICD-10-CM

## 2022-05-25 DIAGNOSIS — I10 ESSENTIAL HYPERTENSION: ICD-10-CM

## 2022-05-25 DIAGNOSIS — I50.22 CHRONIC SYSTOLIC CHF (CONGESTIVE HEART FAILURE) (HCC): Primary | ICD-10-CM

## 2022-05-25 PROBLEM — I50.23 ACUTE ON CHRONIC SYSTOLIC (CONGESTIVE) HEART FAILURE (HCC): Status: RESOLVED | Noted: 2022-01-21 | Resolved: 2022-05-25

## 2022-05-25 PROBLEM — I50.21 ACUTE SYSTOLIC CHF (CONGESTIVE HEART FAILURE), NYHA CLASS 3 (HCC): Status: RESOLVED | Noted: 2020-02-27 | Resolved: 2022-05-25

## 2022-05-25 LAB
A/G RATIO: 1.8 (ref 1.1–2.2)
ALBUMIN SERPL-MCNC: 4.9 G/DL (ref 3.4–5)
ALP BLD-CCNC: 76 U/L (ref 40–129)
ALT SERPL-CCNC: 18 U/L (ref 10–40)
ANION GAP SERPL CALCULATED.3IONS-SCNC: 16 MMOL/L (ref 3–16)
AST SERPL-CCNC: 24 U/L (ref 15–37)
BASOPHILS ABSOLUTE: 0 K/UL (ref 0–0.2)
BASOPHILS RELATIVE PERCENT: 0.4 %
BILIRUB SERPL-MCNC: 0.8 MG/DL (ref 0–1)
BUN BLDV-MCNC: 13 MG/DL (ref 7–20)
CALCIUM SERPL-MCNC: 9.1 MG/DL (ref 8.3–10.6)
CHLORIDE BLD-SCNC: 105 MMOL/L (ref 99–110)
CHOLESTEROL, TOTAL: 124 MG/DL (ref 0–199)
CO2: 21 MMOL/L (ref 21–32)
CREAT SERPL-MCNC: 0.9 MG/DL (ref 0.9–1.3)
EOSINOPHILS ABSOLUTE: 0.1 K/UL (ref 0–0.6)
EOSINOPHILS RELATIVE PERCENT: 2.5 %
GFR AFRICAN AMERICAN: >60
GFR NON-AFRICAN AMERICAN: >60
GLUCOSE BLD-MCNC: 97 MG/DL (ref 70–99)
HCT VFR BLD CALC: 44.6 % (ref 40.5–52.5)
HDLC SERPL-MCNC: 71 MG/DL (ref 40–60)
HEMOGLOBIN: 15.1 G/DL (ref 13.5–17.5)
HEPATITIS C ANTIBODY INTERPRETATION: NORMAL
LDL CHOLESTEROL CALCULATED: 43 MG/DL
LYMPHOCYTES ABSOLUTE: 2.2 K/UL (ref 1–5.1)
LYMPHOCYTES RELATIVE PERCENT: 43 %
MCH RBC QN AUTO: 32.5 PG (ref 26–34)
MCHC RBC AUTO-ENTMCNC: 33.7 G/DL (ref 31–36)
MCV RBC AUTO: 96.4 FL (ref 80–100)
MONOCYTES ABSOLUTE: 0.5 K/UL (ref 0–1.3)
MONOCYTES RELATIVE PERCENT: 9.4 %
NEUTROPHILS ABSOLUTE: 2.2 K/UL (ref 1.7–7.7)
NEUTROPHILS RELATIVE PERCENT: 44.7 %
PDW BLD-RTO: 16.1 % (ref 12.4–15.4)
PLATELET # BLD: 101 K/UL (ref 135–450)
PLATELET SLIDE REVIEW: ABNORMAL
PMV BLD AUTO: 10.2 FL (ref 5–10.5)
POTASSIUM SERPL-SCNC: 4.5 MMOL/L (ref 3.5–5.1)
PROSTATE SPECIFIC ANTIGEN: 0.92 NG/ML (ref 0–4)
RBC # BLD: 4.63 M/UL (ref 4.2–5.9)
SLIDE REVIEW: ABNORMAL
SODIUM BLD-SCNC: 142 MMOL/L (ref 136–145)
T4 FREE: 1.1 NG/DL (ref 0.9–1.8)
TOTAL PROTEIN: 7.6 G/DL (ref 6.4–8.2)
TRIGL SERPL-MCNC: 52 MG/DL (ref 0–150)
VLDLC SERPL CALC-MCNC: 10 MG/DL
WBC # BLD: 5 K/UL (ref 4–11)

## 2022-05-25 PROCEDURE — G8420 CALC BMI NORM PARAMETERS: HCPCS | Performed by: INTERNAL MEDICINE

## 2022-05-25 PROCEDURE — G8427 DOCREV CUR MEDS BY ELIG CLIN: HCPCS | Performed by: INTERNAL MEDICINE

## 2022-05-25 PROCEDURE — 3017F COLORECTAL CA SCREEN DOC REV: CPT | Performed by: INTERNAL MEDICINE

## 2022-05-25 PROCEDURE — 99204 OFFICE O/P NEW MOD 45 MIN: CPT | Performed by: INTERNAL MEDICINE

## 2022-05-25 PROCEDURE — 4004F PT TOBACCO SCREEN RCVD TLK: CPT | Performed by: INTERNAL MEDICINE

## 2022-05-25 SDOH — ECONOMIC STABILITY: FOOD INSECURITY: WITHIN THE PAST 12 MONTHS, YOU WORRIED THAT YOUR FOOD WOULD RUN OUT BEFORE YOU GOT MONEY TO BUY MORE.: NEVER TRUE

## 2022-05-25 SDOH — ECONOMIC STABILITY: FOOD INSECURITY: WITHIN THE PAST 12 MONTHS, THE FOOD YOU BOUGHT JUST DIDN'T LAST AND YOU DIDN'T HAVE MONEY TO GET MORE.: NEVER TRUE

## 2022-05-25 ASSESSMENT — ENCOUNTER SYMPTOMS
COUGH: 0
DIARRHEA: 0
CONSTIPATION: 0
BLOOD IN STOOL: 0
SHORTNESS OF BREATH: 0

## 2022-05-25 ASSESSMENT — PATIENT HEALTH QUESTIONNAIRE - PHQ9
SUM OF ALL RESPONSES TO PHQ QUESTIONS 1-9: 0
2. FEELING DOWN, DEPRESSED OR HOPELESS: 0
SUM OF ALL RESPONSES TO PHQ QUESTIONS 1-9: 0
1. LITTLE INTEREST OR PLEASURE IN DOING THINGS: 0
SUM OF ALL RESPONSES TO PHQ9 QUESTIONS 1 & 2: 0

## 2022-05-25 ASSESSMENT — SOCIAL DETERMINANTS OF HEALTH (SDOH): HOW HARD IS IT FOR YOU TO PAY FOR THE VERY BASICS LIKE FOOD, HOUSING, MEDICAL CARE, AND HEATING?: NOT HARD AT ALL

## 2022-05-25 NOTE — PROGRESS NOTES
Patient comes to the office today for a new patient examination and to establish with a new primary care physician. Patient has a past medical history that is listed below in the past medical history. Currently, the patient is compliant with all health maintenance issues with the exception of colonoscopy, depression screening, shingles/pneumonia vaccination amongst other things. Her medical problems include:    Patient Active Problem List   Diagnosis    Heart failure, unspecified ([facility]    Chronic systolic CHF (congestive heart failure) ([facility]    Essential hypertension    Smoker    Noncompliance     Past Medical History:   Diagnosis Date    Acute on chronic systolic (congestive) heart failure ([facility] 4/47/5732    Acute systolic CHF (congestive heart failure), NYHA class 3 ([facility] 2/27/2020    Asthma      Past Surgical History:   Procedure Laterality Date    HERNIA REPAIR       Family History   Problem Relation Age of Onset    High Blood Pressure Mother      Social History     Socioeconomic History    Marital status: Single     Spouse name: Not on file    Number of children: Not on file    Years of education: Not on file    Highest education level: Not on file   Occupational History    Not on file   Tobacco Use    Smoking status: Current Every Day Smoker     Types: Cigars    Smokeless tobacco: Never Used    Tobacco comment: 2 cigars/day   Vaping Use    Vaping Use: Former    Substances: Always   Substance and Sexual Activity    Alcohol use:  Yes     Alcohol/week: 4.2 standard drinks     Types: 5 Standard drinks or equivalent per week     Comment: 0cc    Drug use: No    Sexual activity: Yes     Partners: Female   Other Topics Concern    Not on file   Social History Narrative    Not on file     Social Determinants of Health     Financial Resource Strain: Low Risk     Difficulty of Paying Living Expenses: Not hard at all   Food Insecurity: No Food Insecurity    Worried About Running Out of [place] in the Last Year: Never true    Ran Out of Food in the Last Year: Never true   Transportation Needs:     Lack of Transportation (Medical): Not on file    Lack of Transportation (Non-Medical):  Not on file   Physical Activity:     Days of Exercise per Week: Not on file    Minutes of Exercise per Session: Not on file   Stress:     Feeling of Stress : Not on file   Social Connections:     Frequency of Communication with Friends and Family: Not on file    Frequency of Social Gatherings with Friends and Family: Not on file    Attends Spiritism Services: Not on file    Active Member of Clubs or Organizations: Not on file    Attends Club or Organization Meetings: Not on file    Marital Status: Not on file   Intimate Partner Violence:     Fear of Current or Ex-Partner: Not on file    Emotionally Abused: Not on file    Physically Abused: Not on file    Sexually Abused: Not on file   Housing Stability:     Unable to Pay for Housing in the Last Year: Not on file    Number of [place] in the Last Year: Not on file    Unstable Housing in the Last Year: Not on file        Current Outpatient Medications:     carvedilol (COREG) 25 MG tablet, Take 1 tablet by mouth 2 times daily, Disp: 180 tablet, Rfl: 3    dapagliflozin (FARXIGA) 10 MG tablet, Take 1 tablet by mouth every morning, Disp: 30 tablet, Rfl: 1    furosemide (LASIX) 20 MG tablet, Take 2 tablets by mouth daily And an extra 20 mg with increased sob, Disp: 180 tablet, Rfl: 0    sacubitril-valsartan (ENTRESTO) 49-51 MG per tablet, Take 1 tablet by mouth 2 times daily, Disp: 60 tablet, Rfl: 2    spironolactone (ALDACTONE) 25 MG tablet, Take 0.5 tablets by mouth daily, Disp: 15 tablet, Rfl: 1    atorvastatin (LIPITOR) 40 MG tablet, Take 1 tablet by mouth daily, Disp: 90 tablet, Rfl: 0    vitamin D (ERGOCALCIFEROL) 1.25 MG (23160 UT) CAPS capsule, Take 1 capsule by mouth once a week, Disp: 12 capsule, Rfl: 1     No Known Allergies     Review of Systems   Constitutional: Negative for chills and fatigue. HENT: Negative for congestion. Respiratory: Negative for cough and shortness of breath. Cardiovascular: Negative for chest pain and palpitations. Gastrointestinal: Negative for blood in stool, constipation and diarrhea. Genitourinary: Negative for dysuria and urgency. Psychiatric/Behavioral: Negative for behavioral problems, sleep disturbance and suicidal ideas. Vitals:    05/25/22 1043   BP: 132/82   Pulse: 58   Temp: 97.7 °F (36.5 °C)   SpO2: 98%       Physical Exam  Vitals reviewed. Constitutional:       General: He is not in acute distress. Appearance: Normal appearance. He is well-developed. He is not diaphoretic. HENT:      Head: Normocephalic and atraumatic. Right Ear: External ear normal.      Left Ear: External ear normal.      Nose: No nasal deformity or rhinorrhea. Mouth/Throat:      Mouth: No lacerations or oral lesions. Dentition: Does not have dentures. Pharynx: No oropharyngeal exudate or uvula swelling. Neck:      Thyroid: No thyroid mass or thyromegaly. Vascular: Normal carotid pulses. No carotid bruit, hepatojugular reflux or JVD. Trachea: Trachea normal. No tracheal tenderness or tracheal deviation. Cardiovascular:      Rate and Rhythm: Normal rate and regular rhythm. Chest Wall: PMI is not displaced. Pulses: Normal pulses. Heart sounds: Normal heart sounds, S1 normal and S2 normal. Heart sounds not distant. No murmur heard. No systolic murmur is present. No diastolic murmur is present. No friction rub. No gallop. No S3 or S4 sounds. Pulmonary:      Effort: Pulmonary effort is normal.      Breath sounds: No stridor. Abdominal:      General: Bowel sounds are normal. There is no distension or abdominal bruit. Palpations: Abdomen is soft. There is no shifting dullness or mass. Tenderness: There is no abdominal tenderness.    Musculoskeletal: Cervical back: Full passive range of motion without pain, normal range of motion and neck supple. No edema or rigidity. Lymphadenopathy:      Cervical: No cervical adenopathy. Skin:     General: Skin is warm and dry. Coloration: Skin is not pale. Findings: No rash. Neurological:      Mental Status: He is alert and oriented to person, place, and time. Cranial Nerves: No cranial nerve deficit. Psychiatric:         Mood and Affect: Mood is not anxious. Speech: Speech normal.         Behavior: Behavior normal. Behavior is not agitated. Thought Content: Thought content normal.         Judgment: Judgment normal.       Assessment/Plan:  Krystle Donald was seen today for established new doctor. Diagnoses and all orders for this visit:    Chronic systolic CHF (congestive heart failure) ([facility]    Essential hypertension  -     Comprehensive Metabolic Panel; Future  -     CBC with Auto Differential; Future  -     Lipid Panel; Future  -     T4, Free; Future    Prostate cancer screening  -     PSA Screening; Future    Screening for human immunodeficiency virus  -     HIV Screen; Future    Need for hepatitis C screening test  -     Hepatitis C Antibody; Future    Diabetes mellitus screening  -     Hemoglobin A1C; Future    Colon cancer screening  -     COLONOSCOPY (Screening);  Future  -     Jama Tong MD, Colorectal Surgery, [place] Montalvo MD  FACP

## 2022-05-26 LAB
ESTIMATED AVERAGE GLUCOSE: 131.2 MG/DL
HBA1C MFR BLD: 6.2 %
HIV AG/AB: NORMAL
HIV ANTIGEN: NORMAL
HIV-1 ANTIBODY: NORMAL
HIV-2 AB: NORMAL

## 2022-05-26 NOTE — TELEPHONE ENCOUNTER
----- Message from CHICHI Perez sent at 12/2/2021  9:16 PM EST -----  Vitamin d is very low  He needs to start vitamin d 23074 once a week, script sent to his pharmacy  Also his fluid level was up and I asked him to increase his lasix for a couple days and to start 101 Dates , make sure he is doing this  Labs in 2 weeks he has an order  Thanks [[email or web address]] : A three generation pedigree was obtained.  Mr. Melo reports that he had a heart murmur as a child.  He was also diagnosed with a thickening of his heart valves and a cardiomyopathy.  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  He also has some hypercholesterolemia, for which he is also followed by his cardiologist.  Mr. Melo has a brother with trisomy 21 (his mom was over 40 when she had him).  Nargis's maternal grandfather likely  of congestive heart failure (no autopsy) but he had several other medical complications such as diabetes, elevated cholesterol and hypertension.  Mr. Melo is of Ashkenazi Islam ancestry and Ms. Morris is of German/[place]/Brittish ancestry.

## 2022-06-22 NOTE — PROGRESS NOTES
[facility]  Advanced CHF/Pulmonary Hypertension   Cardiac Evaluation      Lynn Bertrand  YOB: 1965    Date of Office Visit:  6/27/22    Chief Complaint   Patient presents with    Congestive Heart Failure        History of Present Illness:  Lynn Bertrand is a 64 y.o. male who presents from referral from Dr. Mila Burgos and Grecia Frazier CNP for consultation and management of hypertension and Systolic CHF. He has been noncompliant with medications in the past; He did lose insurance at the beginning of [place] pandemic. He only occasionally drinks beer and has cut out smoking. Hospitalized 2/24-27/2020 for acute sHF with LVEF of 15-20%, diuresed; uncontrolled HTN due to running out of insurance and stopping his medications; ABRAN on CKD. Went to ER 1/2022 for fluid overload and positive COVID test. Left AMA. EF remains 15-20% despite compliance with medical therapy. He has discussed ICD placement with EP, though has declined due to size of device. He has established with  [facility] for PCP. He states he feels better than 1 year ago, edema has improved. He states his breathing has been okay. He did run out of refills for Spironolactone and Vitamin D. Denies chest pain/syncope/palpitations.        No Known Allergies  Current Outpatient Medications   Medication Sig Dispense Refill    carvedilol (COREG) 25 MG tablet Take 1 tablet by mouth 2 times daily 180 tablet 3    dapagliflozin (FARXIGA) 10 MG tablet Take 1 tablet by mouth every morning 30 tablet 1    furosemide (LASIX) 20 MG tablet Take 2 tablets by mouth daily And an extra 20 mg with increased sob (Patient taking differently: Take 20 mg by mouth daily And an extra 20 mg with increased sob) 180 tablet 0    sacubitril-valsartan (ENTRESTO) 49-51 MG per tablet Take 1 tablet by mouth 2 times daily 60 tablet 2    atorvastatin (LIPITOR) 40 MG tablet Take 1 tablet by mouth daily 90 tablet 0    spironolactone (ALDACTONE) 25 MG tablet Take 0.5 tablets by mouth daily (Patient not taking: Reported on 6/27/2022) 15 tablet 1    vitamin D (ERGOCALCIFEROL) 1.25 MG (03152 UT) CAPS capsule Take 1 capsule by mouth once a week (Patient not taking: Reported on 6/27/2022) 12 capsule 1     No current facility-administered medications for this visit. Past Medical History:   Diagnosis Date    Acute on chronic systolic (congestive) heart failure ([facility] [address] 0/27/6294    Acute systolic CHF (congestive heart failure), NYHA class 3 ([facility] 2/27/2020    Asthma      Past Surgical History:   Procedure Laterality Date    HERNIA REPAIR       Family History   Problem Relation Age of Onset    High Blood Pressure Mother      Social History     Socioeconomic History    Marital status: Single     Spouse name: Not on file    Number of children: Not on file    Years of education: Not on file    Highest education level: Not on file   Occupational History    Not on file   Tobacco Use    Smoking status: Current Every Day Smoker     Types: Cigars    Smokeless tobacco: Never Used    Tobacco comment: 2 cigars/day   Vaping Use    Vaping Use: Former    Substances: Always   Substance and Sexual Activity    Alcohol use: Yes     Alcohol/week: 4.2 standard drinks     Types: 5 Standard drinks or equivalent per week     Comment: 0cc    Drug use: No    Sexual activity: Yes     Partners: Female   Other Topics Concern    Not on file   Social History Narrative    Not on file     Social Determinants of Health     Financial Resource Strain: Low Risk     Difficulty of Paying Living Expenses: Not hard at all   Food Insecurity: No Food Insecurity    Worried About [address] in the Last Year: Never true    [address] in the Last Year: Never true   Transportation Needs:     Lack of Transportation (Medical): Not on file    Lack of Transportation (Non-Medical):  Not on file   Physical Activity:     Days of Exercise per Week: Not on file    Minutes of Exercise per Session: Not on file   Stress:     Feeling of Stress : Not on file   Social Connections:     Frequency of Communication with Friends and Family: Not on file    Frequency of Social Gatherings with Friends and Family: Not on file    Attends Gnosticist Services: Not on file    Active Member of Clubs or Organizations: Not on file    Attends Club or Organization Meetings: Not on file    Marital Status: Not on file   Intimate Partner Violence:     Fear of Current or Ex-Partner: Not on file    Emotionally Abused: Not on file    Physically Abused: Not on file    Sexually Abused: Not on file   Housing Stability:     Unable to Pay for Housing in the Last Year: Not on file    Number of [place] in the Last Year: Not on file    Unstable Housing in the Last Year: Not on file       Review of Systems:   Constitutional: there has been no unanticipated weight loss. There's been no change in energy level, sleep pattern, or activity level. Eyes: No visual changes or diplopia. No scleral icterus. ENT: No Headaches, hearing loss or vertigo. No mouth sores or sore throat. Cardiovascular: Reviewed in HPI  Respiratory: No cough or wheezing, no sputum production. No hematemesis. Gastrointestinal: No abdominal pain, appetite loss, blood in stools. No change in bowel or bladder habits. Genitourinary: No dysuria, trouble voiding, or hematuria. Musculoskeletal:  No gait disturbance, weakness or joint complaints. Integumentary: No rash or pruritis. Neurological: No headache, diplopia, change in muscle strength, numbness or tingling. No change in gait, balance, coordination, mood, affect, memory, mentation, behavior. Psychiatric: No anxiety, no depression. Endocrine: No malaise, fatigue or temperature intolerance. No excessive thirst, fluid intake, or urination. No tremor. Hematologic/Lymphatic: No abnormal bruising or bleeding, blood clots or swollen lymph nodes. Allergic/Immunologic: No nasal congestion or hives.     Physical Examination:    Vitals:    06/27/22 1114 06/27/22 1116   BP: (!) 142/96 (!) 144/98   Pulse: 73    SpO2: 98%    Weight: 162 lb (73.5 kg)    Height: 5' 10\" (1.778 m)      Body mass index is 23.24 kg/m². Wt Readings from Last 3 Encounters:   06/27/22 162 lb (73.5 kg)   05/25/22 158 lb (71.7 kg)   03/30/22 159 lb 9.6 oz (72.4 kg)     BP Readings from Last 3 Encounters:   06/27/22 (!) 144/98   05/25/22 132/82   03/30/22 130/74     Constitutional and General Appearance:   WD/WN in NAD  HEENT:  NC/AT  SERVANDO  No problems with hearing  Skin:  Warm, dry  Respiratory:  Normal excursion and expansion without use of accessory muscles  Resp Auscultation: Normal breath sounds without dullness  Cardiovascular: The apical impulses not displaced  Heart tones are crisp and normal  Cervical veins are not engorged  The carotid upstroke is normal in amplitude and contour without delay or bruit  JVP less than 8 cm H2O  RRR with nl S1 and S2 without m,r,g  Peripheral pulses are symmetrical and full  There is no clubbing, cyanosis of the extremities. No edema  Femoral Arteries: 2+ and equal  Pedal Pulses: 2+ and equal   Neck:  No thyromegaly  Abdomen:  No masses or tenderness  Liver/Spleen: No Abnormalities Noted  Neurological/Psychiatric:  Alert and oriented in all spheres  Moves all extremities well  Exhibits normal gait balance and coordination  No abnormalities of mood, affect, memory, mentation, or behavior are noted    Diagnostics:       Echo 3/30/22  Left ventricular size is severely increased. Severely dilated left ventricle. increased left ventricular wall thickness. Severely decreased ejection fraction. EF is estimated to be 15-20% GLS= -5  Moderate mitral regurgitation is present. The right ventricle is enlarged. Right ventricular systolic function is mildly reduced . TAPSE= 1.99 RV S'= 9.99  Mild tricuspid regurgitation. Systolic pulmonary artery pressure (SPAP) estimated at 21 mmHg (RA pressure 3 mmHg).     Echo 09/14/2021    Summary   -Left ventricular cavity size is severely dilated with normal left   ventricular wall thickness.   -Overall left ventricular systolic function appears severely reduced. Ejection fraction is visually estimated to be 15-20%. There is severe   diffuse hypokinesis. (Jimenez's EF: 18%, Heart model EF: 19%). -Global longitudinal strain: -5% (abnormal). -The right ventricle is moderately enlarged. Right ventricular systolic   function is mild-moderately reduced. Echo:  2/15/20  Summary   Left ventricular cavity size is mild to moderately dilated. There is mild concentric left ventricular hypertrophy. Overall, left ventricular systolic function is severely depressed. Ejection fraction is visually estimated to be 15-20%. (Jimenez's=19%)   Severe global hypokinesis. Grade II diastolic dysfunction with elevated LV filling pressures. Mild to moderate mitral regurgitation. The left atrium is mildly dilated. Mild to moderate tricuspid regurgitation with PASP of 25 mmHg. [facility]:  2/27/20 (Dr. Quinn Skaggs)  Cardiac Cath : Anatomy:   LM-Normal   LAD-Normal  Cx-Normal   OM- Normal   RCA-Dominant  RPDA- Normal    LVEDP- 10   Impression  ~Coronary Angiography w/ Normal Coronaries  ~Normal LVEDP  ~Non ischemic Cardiomyopathy    Labs were reviewed including labs from other hospital systems through [facility]. Cardiac testing was reviewed including echos, nuclear scans, cardiac catheterization, including from other hospital systems through [facility]. Assessment:    1. Chronic systolic heart failure ([address]    2. Essential hypertension    3. Hyperlipidemia, unspecified hyperlipidemia type    4. SOB (shortness of breath)         1.  Chronic Systolic CHF  Normal cors by 2/2020 [facility]  EF 15-20% (2/2020) --> EF 15-20% (9/2021)--> 15-20% (3/2022)  On Coreg 12.5 mg BID for over a year---> increased to 25 mg 10/2021  Entresto started 4/2021  [place] started 12/2021  Spironolactone added 2/2022- he ran out of refills, so has not been taking recently  Discussed AICD placement with JUSTEN 10/2021; he has declined in the past due to size of device. We discussed recommendation at length today. He is willing to see Dr. Leela Isaac again to discuss further. Refills sent for Spironolactone and Vitamin D  Increase Entresto to 97/103 mg bid  Labwork (CMP, BNP, CBC, Vit D)  Echo in 4 months    2. Hypertension  BP (!) 144/98   Pulse 73   Ht 5' 10\" (1.778 m)   Wt 162 lb (73.5 kg)   SpO2 98%   BMI 23.24 kg/m²    Eleavted today- states he missed medications yesterday and took his AM pills late today. He feels he does not need to go up on medication doses. Increase Entresto to 97/103 mg bid    3. Tobacco dependence   Continued cessation discussed    4. Hyperlipidemia  - On Lipitor  - Lipids 03/03/2021 TC 89, HDL 55, LDL 24, TG 49   5/2022:  TG 52 HDL 71 LDL 43    5. ETOH  Only occasional per his report    Plan:  1. Refills sent for Spironolactone and Vitamin D  2. Consider meeting with Dr. Leela Isaac to discuss ICD again; we can decide this after your next echo  3. Increase Entresto to 97/103 mg bid. You can finish your current bottle first.  4. Labwork (CMP, BNP, CBC, Vit D)  5. Follow up with Alina Thomas in 4 months with a Limited Echo the same day      Time Based Itemization  A total of 60 minutes was spent on today's patient encounter. If applicable, non-patient-facing activities:  ( x)Preparing to see the patient and reviewing records  ( ) Individual interpretation of results  ( ) Discussion or coordination of care with other health care professionals  ( x) Ordering of unique tests, medications, or procedures  ( x) Documentation within the EHR      I appreciate the opportunity of cooperating in the care of this patient. Buzz Lam M.D., [address] [facility]: This note was scribed in the presence of Dr. Amador Salinas MD by Evin Schmitt RN.   The scribe's documentation has been prepared under my direction and personally reviewed by me in its entirety. I confirm that the note above accurately reflects all work, treatment, procedures, and medical decision making performed by me.

## 2022-06-24 ENCOUNTER — TELEPHONE (OUTPATIENT)
Dept: CARDIOLOGY CLINIC | Age: 57
End: 2022-06-24

## 2022-06-24 NOTE — TELEPHONE ENCOUNTER
Please put 2 boxes of farxiga and a bottle of 49-51 mg entresto at the   He will be here to  at 430 pm today

## 2022-06-24 NOTE — TELEPHONE ENCOUNTER
Pt called stating he needs to speak a few minutes of NPRG time, when ask if there are any problems or issues the pt stated no, he just needs to speak to [facility].     Pls advise thank you     Cisco Aguilar  [phone]

## 2022-06-27 ENCOUNTER — OFFICE VISIT (OUTPATIENT)
Dept: CARDIOLOGY CLINIC | Age: 57
End: 2022-06-27
Payer: COMMERCIAL

## 2022-06-27 ENCOUNTER — HOSPITAL ENCOUNTER (OUTPATIENT)
Age: 57
Discharge: HOME | End: 2022-06-27
Payer: COMMERCIAL

## 2022-06-27 VITALS
HEIGHT: 70 IN | SYSTOLIC BLOOD PRESSURE: 144 MMHG | BODY MASS INDEX: 23.19 KG/M2 | DIASTOLIC BLOOD PRESSURE: 98 MMHG | WEIGHT: 162 LBS | OXYGEN SATURATION: 98 % | HEART RATE: 73 BPM

## 2022-06-27 DIAGNOSIS — R06.02 SOB (SHORTNESS OF BREATH): ICD-10-CM

## 2022-06-27 DIAGNOSIS — E78.5 HYPERLIPIDEMIA, UNSPECIFIED HYPERLIPIDEMIA TYPE: ICD-10-CM

## 2022-06-27 DIAGNOSIS — I10 ESSENTIAL HYPERTENSION: ICD-10-CM

## 2022-06-27 DIAGNOSIS — I50.22 CHRONIC SYSTOLIC HEART FAILURE (HCC): ICD-10-CM

## 2022-06-27 DIAGNOSIS — I50.22 CHRONIC SYSTOLIC HEART FAILURE (HCC): Primary | ICD-10-CM

## 2022-06-27 LAB
A/G RATIO: 1.8 (ref 1.1–2.2)
ALBUMIN SERPL-MCNC: 4.8 G/DL (ref 3.4–5)
ALP BLD-CCNC: 83 U/L (ref 40–129)
ALT SERPL-CCNC: 18 U/L (ref 10–40)
ANION GAP SERPL CALCULATED.3IONS-SCNC: 12 MMOL/L (ref 3–16)
AST SERPL-CCNC: 22 U/L (ref 15–37)
ATYPICAL LYMPHOCYTE RELATIVE PERCENT: 1 % (ref 0–6)
BANDED NEUTROPHILS RELATIVE PERCENT: 2 % (ref 0–7)
BASOPHILS ABSOLUTE: 0.1 K/UL (ref 0–0.2)
BASOPHILS RELATIVE PERCENT: 2 %
BILIRUB SERPL-MCNC: 0.6 MG/DL (ref 0–1)
BUN BLDV-MCNC: 15 MG/DL (ref 7–20)
CALCIUM SERPL-MCNC: 9.4 MG/DL (ref 8.3–10.6)
CHLORIDE BLD-SCNC: 102 MMOL/L (ref 99–110)
CO2: 28 MMOL/L (ref 21–32)
CREAT SERPL-MCNC: 1.2 MG/DL (ref 0.9–1.3)
EOSINOPHILS ABSOLUTE: 0 K/UL (ref 0–0.6)
EOSINOPHILS RELATIVE PERCENT: 0 %
GFR AFRICAN AMERICAN: >60
GFR NON-AFRICAN AMERICAN: >60
GLUCOSE BLD-MCNC: 108 MG/DL (ref 70–99)
HCT VFR BLD CALC: 42.5 % (ref 40.5–52.5)
HEMOGLOBIN: 14.4 G/DL (ref 13.5–17.5)
LYMPHOCYTES ABSOLUTE: 2.3 K/UL (ref 1–5.1)
LYMPHOCYTES RELATIVE PERCENT: 40 %
MCH RBC QN AUTO: 33.2 PG (ref 26–34)
MCHC RBC AUTO-ENTMCNC: 33.9 G/DL (ref 31–36)
MCV RBC AUTO: 97.9 FL (ref 80–100)
MONOCYTES ABSOLUTE: 0.3 K/UL (ref 0–1.3)
MONOCYTES RELATIVE PERCENT: 6 %
NEUTROPHILS ABSOLUTE: 2.9 K/UL (ref 1.7–7.7)
NEUTROPHILS RELATIVE PERCENT: 49 %
PDW BLD-RTO: 15.5 % (ref 12.4–15.4)
PLATELET # BLD: 100 K/UL (ref 135–450)
PLATELET SLIDE REVIEW: ABNORMAL
PMV BLD AUTO: 11.2 FL (ref 5–10.5)
POTASSIUM SERPL-SCNC: 4.6 MMOL/L (ref 3.5–5.1)
PRO-BNP: 1588 PG/ML (ref 0–124)
RBC # BLD: 4.34 M/UL (ref 4.2–5.9)
SLIDE REVIEW: ABNORMAL
SODIUM BLD-SCNC: 142 MMOL/L (ref 136–145)
TOTAL PROTEIN: 7.5 G/DL (ref 6.4–8.2)
VITAMIN D 25-HYDROXY: 41.7 NG/ML
WBC # BLD: 5.7 K/UL (ref 4–11)

## 2022-06-27 PROCEDURE — G8428 CUR MEDS NOT DOCUMENT: HCPCS | Performed by: INTERNAL MEDICINE

## 2022-06-27 PROCEDURE — 82306 VITAMIN D 25 HYDROXY: CPT

## 2022-06-27 PROCEDURE — 99204 OFFICE O/P NEW MOD 45 MIN: CPT | Performed by: INTERNAL MEDICINE

## 2022-06-27 PROCEDURE — 80053 COMPREHEN METABOLIC PANEL: CPT

## 2022-06-27 PROCEDURE — 85025 COMPLETE CBC W/AUTO DIFF WBC: CPT

## 2022-06-27 PROCEDURE — 36415 COLL VENOUS BLD VENIPUNCTURE: CPT

## 2022-06-27 PROCEDURE — G8420 CALC BMI NORM PARAMETERS: HCPCS | Performed by: INTERNAL MEDICINE

## 2022-06-27 PROCEDURE — 83880 ASSAY OF NATRIURETIC PEPTIDE: CPT

## 2022-06-27 RX ORDER — ERGOCALCIFEROL 1.25 MG/1
50000 CAPSULE ORAL WEEKLY
Qty: 12 CAPSULE | Refills: 2 | Status: SHIPPED | OUTPATIENT
Start: 2022-06-27 | End: 2022-07-25 | Stop reason: ALTCHOICE

## 2022-06-27 RX ORDER — SPIRONOLACTONE 25 MG/1
12.5 TABLET ORAL DAILY
Qty: 45 TABLET | Refills: 1 | Status: SHIPPED | OUTPATIENT
Start: 2022-06-27 | End: 2022-09-21 | Stop reason: SDUPTHER

## 2022-06-27 NOTE — PATIENT INSTRUCTIONS
Plan:  1. Refills sent for Spironolactone and Vitamin D  2. Consider meeting with Dr. Soumya Mccormack to discuss ICD again; we can decide this after your next echo  3. Increase Entresto to 97/103 mg bid. You can finish your current bottle first.  4. Labwork (CMP, BNP, CBC, Vit D)  5.  Follow up with Stevie Mishra in 4 months with a Limited Echo the same day

## 2022-07-11 ENCOUNTER — TELEPHONE (OUTPATIENT)
Dept: CARDIOLOGY CLINIC | Age: 57
End: 2022-07-11

## 2022-07-11 NOTE — TELEPHONE ENCOUNTER
Pt called asking if he can get in to see NPRG, pt stated for the past three days he hasn't been feeling good and is really weak.     Pls advise thank you

## 2022-07-18 NOTE — TELEPHONE ENCOUNTER
Pt called back stating he has not heard back from anyone, and he is still not feeling well. Pls provide date and time if the pt can see another provider?     Pls advise thank you

## 2022-07-19 NOTE — TELEPHONE ENCOUNTER
Pt has been scheduled for 7/25 at 1130 am with [facility] [place]

## 2022-07-25 ENCOUNTER — HOSPITAL ENCOUNTER (OUTPATIENT)
Age: 57
Discharge: HOME | End: 2022-07-25
Payer: COMMERCIAL

## 2022-07-25 ENCOUNTER — OFFICE VISIT (OUTPATIENT)
Dept: CARDIOLOGY CLINIC | Age: 57
End: 2022-07-25
Payer: COMMERCIAL

## 2022-07-25 VITALS
HEART RATE: 68 BPM | BODY MASS INDEX: 22.07 KG/M2 | WEIGHT: 154.2 LBS | DIASTOLIC BLOOD PRESSURE: 82 MMHG | OXYGEN SATURATION: 98 % | SYSTOLIC BLOOD PRESSURE: 132 MMHG | HEIGHT: 70 IN

## 2022-07-25 DIAGNOSIS — F10.10 ALCOHOL ABUSE: ICD-10-CM

## 2022-07-25 DIAGNOSIS — F17.200 SMOKER: ICD-10-CM

## 2022-07-25 DIAGNOSIS — I49.8 FLUTTERING HEART: ICD-10-CM

## 2022-07-25 DIAGNOSIS — I50.22 CHRONIC SYSTOLIC CHF (CONGESTIVE HEART FAILURE) (HCC): Primary | ICD-10-CM

## 2022-07-25 DIAGNOSIS — I10 ESSENTIAL HYPERTENSION: ICD-10-CM

## 2022-07-25 DIAGNOSIS — E55.9 VITAMIN D DEFICIENCY: ICD-10-CM

## 2022-07-25 DIAGNOSIS — I50.22 CHRONIC SYSTOLIC CHF (CONGESTIVE HEART FAILURE) (HCC): ICD-10-CM

## 2022-07-25 LAB
ANION GAP SERPL CALCULATED.3IONS-SCNC: 15 MMOL/L (ref 3–16)
BUN BLDV-MCNC: 12 MG/DL (ref 7–20)
CALCIUM SERPL-MCNC: 9.4 MG/DL (ref 8.3–10.6)
CHLORIDE BLD-SCNC: 102 MMOL/L (ref 99–110)
CO2: 22 MMOL/L (ref 21–32)
CREAT SERPL-MCNC: 0.9 MG/DL (ref 0.9–1.3)
GFR AFRICAN AMERICAN: >60
GFR NON-AFRICAN AMERICAN: >60
GLUCOSE BLD-MCNC: 114 MG/DL (ref 70–99)
POTASSIUM SERPL-SCNC: 4.4 MMOL/L (ref 3.5–5.1)
SODIUM BLD-SCNC: 139 MMOL/L (ref 136–145)

## 2022-07-25 PROCEDURE — 36415 COLL VENOUS BLD VENIPUNCTURE: CPT

## 2022-07-25 PROCEDURE — 80048 BASIC METABOLIC PNL TOTAL CA: CPT

## 2022-07-25 PROCEDURE — 93270 REMOTE 30 DAY ECG REV/REPORT: CPT | Performed by: INTERNAL MEDICINE

## 2022-07-25 PROCEDURE — 83880 ASSAY OF NATRIURETIC PEPTIDE: CPT

## 2022-07-25 PROCEDURE — G8420 CALC BMI NORM PARAMETERS: HCPCS | Performed by: CLINICAL NURSE SPECIALIST

## 2022-07-25 PROCEDURE — 99214 OFFICE O/P EST MOD 30 MIN: CPT | Performed by: CLINICAL NURSE SPECIALIST

## 2022-07-25 PROCEDURE — 3017F COLORECTAL CA SCREEN DOC REV: CPT | Performed by: CLINICAL NURSE SPECIALIST

## 2022-07-25 PROCEDURE — 4004F PT TOBACCO SCREEN RCVD TLK: CPT | Performed by: CLINICAL NURSE SPECIALIST

## 2022-07-25 PROCEDURE — G8427 DOCREV CUR MEDS BY ELIG CLIN: HCPCS | Performed by: CLINICAL NURSE SPECIALIST

## 2022-07-25 PROCEDURE — 93000 ELECTROCARDIOGRAM COMPLETE: CPT | Performed by: CLINICAL NURSE SPECIALIST

## 2022-07-25 RX ORDER — MELATONIN
1000 DAILY
Qty: 90 TABLET | Refills: 1 | Status: SHIPPED | OUTPATIENT
Start: 2022-07-25

## 2022-07-25 RX ORDER — ERGOCALCIFEROL 1.25 MG/1
50000 CAPSULE ORAL WEEKLY
Qty: 12 CAPSULE | Refills: 2 | Status: CANCELLED | OUTPATIENT
Start: 2022-07-25

## 2022-07-25 NOTE — PROGRESS NOTES
[facility]  Progress Note    Primary Care Doctor:  No primary care provider on file. Chief Complaint   Patient presents with    Congestive Heart Failure    Hypertension        History of Present Illness:  64 y.o. male with history of non compliance with medications, uncontrolled HTN, alcohol and smokes  2/24-27/2020 for acute sHF with LVEF of 15-20%, diuresed; uncontrolled HTN due to running out of insurance and stopping his medications; ABRAN on CKD  entresto 4/14/2021    I had the pleasure of seeing Taiwo Garcia in follow up for systolic heart failure. He is in for an earlier visit with complaints of fluttering in his chest on July 8th. He doubled his lasix for a couple days and felt better. He is out of the [employer] [facility] for a couple weeks. EKG with NSR. He states he is having difficulty doing his job. His weight is stable and he is taking all his medications. He continues to drink beer a couple times a month. Past Medical History:   has a past medical history of Acute on chronic systolic (congestive) heart failure (HCC), Acute systolic CHF (congestive heart failure), NYHA class 3 ([address] and Asthma. Surgical History:   has a past surgical history that includes hernia repair. Social History:   reports that he has been smoking cigars. He has never used smokeless tobacco. He reports current alcohol use of about 4.2 standard drinks per week. He reports that he does not use drugs. Family History:   Family History   Problem Relation Age of Onset    High Blood Pressure Mother        Home Medications:  Prior to Admission medications    Medication Sig Start Date End Date Taking?  Authorizing Provider   dapagliflozin (FARXIGA) 10 MG tablet Take 1 tablet by mouth every morning 7/25/22  Yes Mirna Douglas APRN - CNS   spironolactone (ALDACTONE) 25 MG tablet Take 0.5 tablets by mouth daily 6/27/22  Yes Cory Monroe MD   sacubitril-valsartan (ENTRESTO)  MG per tablet Take 1 tablet by mouth 2 times daily 6/27/22  Yes Lizabeth Basurto MD   carvedilol (COREG) 25 MG tablet Take 1 tablet by mouth 2 times daily 5/5/22  Yes CHICHI Mason   furosemide (LASIX) 20 MG tablet Take 2 tablets by mouth daily And an extra 20 mg with increased sob  Patient taking differently: Take 20 mg by mouth in the morning. And an extra 20 mg with increased sob. 5/5/22  Yes CHICHI Mason   atorvastatin (LIPITOR) 40 MG tablet Take 1 tablet by mouth daily 1/17/22  Yes CHICHI Mason        Allergies:  Patient has no known allergies. Review of Systems:   Constitutional: there has been no unanticipated weight loss. There's been no change in energy level, sleep pattern, or activity level. Eyes: No visual changes or diplopia. No scleral icterus. ENT: No Headaches, hearing loss or vertigo. No mouth sores or sore throat. Cardiovascular: Reviewed in HPI  Respiratory: No cough or wheezing, no sputum production. No hematemesis. Gastrointestinal: No abdominal pain, appetite loss, blood in stools. No change in bowel or bladder habits. Genitourinary: No dysuria, trouble voiding, or hematuria. Musculoskeletal:  No gait disturbance, weakness or joint complaints. Integumentary: No rash or pruritis. Neurological: No headache, diplopia, change in muscle strength, numbness or tingling. No change in gait, balance, coordination, mood, affect, memory, mentation, behavior. Psychiatric: No anxiety, no depression. Endocrine: No malaise, fatigue or temperature intolerance. No excessive thirst, fluid intake, or urination. No tremor. Hematologic/Lymphatic: No abnormal bruising or bleeding, blood clots or swollen lymph nodes. Allergic/Immunologic: No nasal congestion or hives.     Physical Examination:    Vitals:    07/25/22 1132   BP: 132/82   Pulse: 68   SpO2: 98%   Weight: 154 lb 3.2 oz (69.9 kg)   Height: 5' 10\" (1.778 m)        Constitutional and General Appearance: Warm and dry, no apparent distress, normal coloration  HEENT:  Normocephalic, atraumatic  Respiratory:  Normal excursion and expansion without use of accessory muscles  Resp Auscultation: Normal breath sounds without dullness  Cardiovascular: The apical impulses not displaced  Heart tones are crisp and normal  JVP normal cm H2O  Regular rate and rhythm  Peripheral pulses are symmetrical and full  There is no clubbing, cyanosis of the extremities.   no edema  Pedal Pulses: 2+ and equal   Abdomen:   No masses or tenderness  Liver/Spleen: No Abnormalities Noted  Neurological/Psychiatric:  Alert and oriented in all spheres  Moves all extremities well  Exhibits normal gait balance and coordination  No abnormalities of mood, affect, memory, mentation, or behavior are noted    Lab Data:    CBC:   Lab Results   Component Value Date/Time    WBC 5.7 06/27/2022 01:00 PM    WBC 5.0 05/25/2022 11:34 AM    WBC 4.6 01/21/2022 10:48 AM    RBC 4.34 06/27/2022 01:00 PM    RBC 4.63 05/25/2022 11:34 AM    RBC 4.27 01/21/2022 10:48 AM    HGB 14.4 06/27/2022 01:00 PM    HGB 15.1 05/25/2022 11:34 AM    HGB 13.8 01/21/2022 10:48 AM    HCT 42.5 06/27/2022 01:00 PM    HCT 44.6 05/25/2022 11:34 AM    HCT 41.4 01/21/2022 10:48 AM    MCV 97.9 06/27/2022 01:00 PM    MCV 96.4 05/25/2022 11:34 AM    MCV 97.1 01/21/2022 10:48 AM    RDW 15.5 06/27/2022 01:00 PM    RDW 16.1 05/25/2022 11:34 AM    RDW 15.6 01/21/2022 10:48 AM     06/27/2022 01:00 PM     05/25/2022 11:34 AM     01/21/2022 10:48 AM     BMP:  Lab Results   Component Value Date/Time     06/27/2022 01:00 PM     05/25/2022 11:34 AM     03/04/2022 01:55 PM    K 4.6 06/27/2022 01:00 PM    K 4.5 05/25/2022 11:34 AM    K 4.8 03/04/2022 01:55 PM    K 4.1 01/21/2022 10:48 AM    K 4.3 02/24/2020 06:28 PM     06/27/2022 01:00 PM     05/25/2022 11:34 AM     03/04/2022 01:55 PM    CO2 28 06/27/2022 01:00 PM    CO2 21 05/25/2022 11:34 AM    CO2 21 03/04/2022 01:55 PM BUN 15 06/27/2022 01:00 PM    BUN 13 05/25/2022 11:34 AM    BUN 16 03/04/2022 01:55 PM    CREATININE 1.2 06/27/2022 01:00 PM    CREATININE 0.9 05/25/2022 11:34 AM    CREATININE 1.1 03/04/2022 01:55 PM     BNP:   Lab Results   Component Value Date/Time    PROBNP 1,588 06/27/2022 01:00 PM    PROBNP 654 03/04/2022 01:55 PM    PROBNP 4,060 02/08/2022 01:06 PM     Cardiac Imaging:  Echo 3/30/22  Left ventricular size is severely increased. Severely dilated left ventricle. increased left ventricular wall thickness. Severely decreased ejection fraction. EF is estimated to be 15-20% GLS= -5  Moderate mitral regurgitation is present. The right ventricle is enlarged. Right ventricular systolic function is mildly reduced . TAPSE= 1.99 RV S'= 9.99  Mild tricuspid regurgitation. Systolic pulmonary artery pressure (SPAP) estimated at 21 mmHg (RA pressure 3 mmHg). Echo 9/14/2021   Summary   -Left ventricular cavity size is severely dilated with normal left   ventricular wall thickness.   -Overall left ventricular systolic function appears severely reduced. Ejection fraction is visually estimated to be 15-20%. There is severe   diffuse hypokinesis. (Jimenez's EF: 18%, Heart model EF: 19%). -Global longitudinal strain: -5% (abnormal). -The right ventricle is moderately enlarged. Right ventricular systolic   function is mild-moderately reduced. 2/27/2020 Cardiac Cath :  [facility]  Anatomy:   LM-Normal   LAD-Normal  Cx-Normal   OM- Normal   RCA-Dominant  RPDA- Normal      LVEDP- 10     Impression  ~Coronary Angiography w/ Normal Coronaries  ~Normal LVEDP  ~Non ischemic Cardiomyopathy    Echo 2/25/2020  Summary   Left ventricular cavity size is mild to moderately dilated. There is mild concentric left ventricular hypertrophy. Overall, left ventricular systolic function is severely depressed. Ejection fraction is visually estimated to be 15-20%. (Jimenez's=19%)   Severe global hypokinesis.    Grade II diastolic dysfunction with elevated LV filling pressures. Mild to moderate mitral regurgitation. The left atrium is mildly dilated. Mild to moderate tricuspid regurgitation with PASP of 25 mmHg    Assessment:    1. Chronic systolic heart failure (HCC) on arni, BB, aldosterone antagonist and farxiga   2. Essential hypertension    3. Vitamin d deficiency   4. Smoker    5. Alcohol abuse   6.   Fluttering heart    Plan:   Blood work today  Event monitor  Schedule an appt with EP regarding defibrillator  Samples of farxiga  Stop all alcohol  RTO in October with me    I appreciate the opportunity of cooperating in the care of this individual.    Darylene Poland, APRN - CNS, CNS, 7/25/2022, 3:12 PM

## 2022-07-25 NOTE — PATIENT INSTRUCTIONS
Blood work today  Event monitor  Schedule an appt with EP regarding defibrillator  Samples of farxiga  Stop all alcohol  RTO in October with me

## 2022-07-26 ENCOUNTER — TELEPHONE (OUTPATIENT)
Dept: CARDIOLOGY CLINIC | Age: 57
End: 2022-07-26

## 2022-07-26 LAB — PRO-BNP: 506 PG/ML (ref 0–124)

## 2022-07-26 NOTE — TELEPHONE ENCOUNTER
Labs are good  Continue current plan we discussed in OV  I want him to let me know how he is doing next week after resuming the farxiga

## 2022-07-27 NOTE — TELEPHONE ENCOUNTER
Spoke to patient he verbalized understanding. Also let him know his FLMA was waiting on NPRG signature.

## 2022-08-23 PROCEDURE — 93272 ECG/REVIEW INTERPRET ONLY: CPT | Performed by: INTERNAL MEDICINE

## 2022-09-21 ENCOUNTER — TELEPHONE (OUTPATIENT)
Dept: CARDIOLOGY CLINIC | Age: 57
End: 2022-09-21

## 2022-09-21 RX ORDER — SPIRONOLACTONE 25 MG/1
12.5 TABLET ORAL DAILY
Qty: 45 TABLET | Refills: 1 | Status: SHIPPED | OUTPATIENT
Start: 2022-09-21 | End: 2022-10-11 | Stop reason: ALTCHOICE

## 2022-09-21 RX ORDER — ATORVASTATIN CALCIUM 40 MG/1
40 TABLET, FILM COATED ORAL DAILY
Qty: 90 TABLET | Refills: 1 | Status: SHIPPED | OUTPATIENT
Start: 2022-09-21

## 2022-09-21 NOTE — TELEPHONE ENCOUNTER
Medication Refill    Medication needing refilled:  spironolactone (ALDACTONE)  atorvastatin (LIPITOR)    Dosage of the medication:  25mg  40mg    How are you taking this medication (QD, BID, TID, QID, PRN):  Take 0.5 tablets by mouth daily  Take 1 tablet by mouth daily  30 or 90 day supply called in:   45  90  When will you run out of your medication:    Which Pharmacy are we sending the medication to?:    [facility], [address] [place] [address]   Phone:  [phone]  Fax:  [phone]    NOTE:Per Pt he would like to  the refills today.   Please advise

## 2022-09-21 NOTE — TELEPHONE ENCOUNTER
Prescription refill    Last OV:07/25/2022    Last Refill:01/17/2022    Labs:07/25/2022    Future Appt: 09/26/2022

## 2022-09-27 PROBLEM — E78.5 HLD (HYPERLIPIDEMIA): Status: ACTIVE | Noted: 2022-09-27

## 2022-10-11 ENCOUNTER — OFFICE VISIT (OUTPATIENT)
Dept: CARDIOLOGY CLINIC | Age: 57
End: 2022-10-11
Payer: COMMERCIAL

## 2022-10-11 ENCOUNTER — HOSPITAL ENCOUNTER (OUTPATIENT)
Age: 57
Discharge: HOME | End: 2022-10-11
Payer: COMMERCIAL

## 2022-10-11 VITALS
DIASTOLIC BLOOD PRESSURE: 68 MMHG | BODY MASS INDEX: 21.47 KG/M2 | OXYGEN SATURATION: 97 % | WEIGHT: 150 LBS | HEIGHT: 70 IN | SYSTOLIC BLOOD PRESSURE: 112 MMHG | HEART RATE: 94 BPM

## 2022-10-11 DIAGNOSIS — I10 ESSENTIAL HYPERTENSION: ICD-10-CM

## 2022-10-11 DIAGNOSIS — I50.22 CHRONIC SYSTOLIC HEART FAILURE (HCC): ICD-10-CM

## 2022-10-11 DIAGNOSIS — E55.9 VITAMIN D DEFICIENCY: ICD-10-CM

## 2022-10-11 DIAGNOSIS — F17.200 SMOKER: ICD-10-CM

## 2022-10-11 DIAGNOSIS — I50.22 CHRONIC SYSTOLIC HEART FAILURE (HCC): Primary | ICD-10-CM

## 2022-10-11 LAB
ANION GAP SERPL CALCULATED.3IONS-SCNC: 12 MMOL/L (ref 3–16)
BUN BLDV-MCNC: 15 MG/DL (ref 7–20)
CALCIUM SERPL-MCNC: 9.3 MG/DL (ref 8.3–10.6)
CHLORIDE BLD-SCNC: 101 MMOL/L (ref 99–110)
CO2: 23 MMOL/L (ref 21–32)
CREAT SERPL-MCNC: 1.1 MG/DL (ref 0.9–1.3)
GFR AFRICAN AMERICAN: >60
GFR NON-AFRICAN AMERICAN: >60
GLUCOSE BLD-MCNC: 170 MG/DL (ref 70–99)
POTASSIUM SERPL-SCNC: 4.2 MMOL/L (ref 3.5–5.1)
PRO-BNP: 72 PG/ML (ref 0–124)
SODIUM BLD-SCNC: 136 MMOL/L (ref 136–145)

## 2022-10-11 PROCEDURE — G8420 CALC BMI NORM PARAMETERS: HCPCS | Performed by: CLINICAL NURSE SPECIALIST

## 2022-10-11 PROCEDURE — 3017F COLORECTAL CA SCREEN DOC REV: CPT | Performed by: CLINICAL NURSE SPECIALIST

## 2022-10-11 PROCEDURE — 99214 OFFICE O/P EST MOD 30 MIN: CPT | Performed by: CLINICAL NURSE SPECIALIST

## 2022-10-11 PROCEDURE — G8427 DOCREV CUR MEDS BY ELIG CLIN: HCPCS | Performed by: CLINICAL NURSE SPECIALIST

## 2022-10-11 PROCEDURE — 80048 BASIC METABOLIC PNL TOTAL CA: CPT

## 2022-10-11 PROCEDURE — 4004F PT TOBACCO SCREEN RCVD TLK: CPT | Performed by: CLINICAL NURSE SPECIALIST

## 2022-10-11 PROCEDURE — 36415 COLL VENOUS BLD VENIPUNCTURE: CPT

## 2022-10-11 PROCEDURE — 83880 ASSAY OF NATRIURETIC PEPTIDE: CPT

## 2022-10-11 PROCEDURE — G8484 FLU IMMUNIZE NO ADMIN: HCPCS | Performed by: CLINICAL NURSE SPECIALIST

## 2022-10-11 RX ORDER — DIGOXIN 125 MCG
125 TABLET ORAL
Qty: 36 TABLET | Refills: 1 | Status: SHIPPED | OUTPATIENT
Start: 2022-10-12

## 2022-10-11 RX ORDER — EPLERENONE 25 MG/1
12.5 TABLET, FILM COATED ORAL DAILY
Qty: 45 TABLET | Refills: 1 | Status: SHIPPED | OUTPATIENT
Start: 2022-10-11

## 2022-10-11 NOTE — PATIENT INSTRUCTIONS
Stop aldactone and start eplenenone 12.5 mg once a day  Start digoxin 125 mcg mon wed and Friday  Check blood work today  Continue everything else  Echo in December  RTO in [place]

## 2022-10-11 NOTE — PROGRESS NOTES
[facility]  Progress Note    Primary Care Doctor:  No primary care provider on file. Chief Complaint   Patient presents with    Follow-up    Congestive Heart Failure        History of Present Illness:  64 y.o. male with history of non compliance with medications, uncontrolled HTN, alcohol and smokes  2/24-27/2020 for acute sHF with LVEF of 15-20%, diuresed; uncontrolled HTN due to running out of insurance and stopping his medications; ABRAN on CKD  entresto 4/14/2021    I had the pleasure of seeing Val Morales in follow up for systolic heart failure. He is ambulatory and by his self. He complains of some right nipple tenderness. His weight is staying 150 (over the past 2 years he was 165->155). He denies any palpitations, lightheadedness, edema, chest pain or shortness of breath. He denies drinking any alcohol. He is compliant with all his medications. He changed jobs which is less stress for him. Past Medical History:   has a past medical history of Acute on chronic systolic (congestive) heart failure (HCC), Acute systolic CHF (congestive heart failure), NYHA class 3 ([address] and Asthma. Surgical History:   has a past surgical history that includes hernia repair. Social History:   reports that he has been smoking cigars. He has never used smokeless tobacco. He reports current alcohol use of about 4.2 standard drinks per week. He reports that he does not use drugs. Family History:   Family History   Problem Relation Age of Onset    High Blood Pressure Mother        Home Medications:  Prior to Admission medications    Medication Sig Start Date End Date Taking?  Authorizing Provider   spironolactone (ALDACTONE) 25 MG tablet Take 0.5 tablets by mouth daily 9/21/22  Yes Mirna Herrera APRN - CNS   atorvastatin (LIPITOR) 40 MG tablet Take 1 tablet by mouth daily 9/21/22  Yes Aspen Martinez MD   dapagliflozin (FARXIGA) 10 MG tablet Take 1 tablet by mouth every morning 7/25/22  Yes Yulissa LEBLANC CHICHI Mancilla   vitamin D3 (CHOLECALCIFEROL) 25 MCG (1000 UT) TABS tablet Take 1 tablet by mouth in the morning. 7/25/22  Yes CHICHI Villarreal   sacubitril-valsartan (ENTRESTO)  MG per tablet Take 1 tablet by mouth 2 times daily 6/27/22  Yes Nella Will MD   carvedilol (COREG) 25 MG tablet Take 1 tablet by mouth 2 times daily 5/5/22  Yes CHICHI Villarreal   furosemide (LASIX) 20 MG tablet Take 2 tablets by mouth daily And an extra 20 mg with increased sob  Patient taking differently: Take 20 mg by mouth daily And an extra 20 mg with increased sob 5/5/22  Yes CHICHI Villarreal        Allergies:  Patient has no known allergies. Review of Systems:   Constitutional: there has been no unanticipated weight loss. There's been no change in energy level, sleep pattern, or activity level. Eyes: No visual changes or diplopia. No scleral icterus. ENT: No Headaches, hearing loss or vertigo. No mouth sores or sore throat. Cardiovascular: Reviewed in HPI  Respiratory: No cough or wheezing, no sputum production. No hematemesis. Gastrointestinal: No abdominal pain, appetite loss, blood in stools. No change in bowel or bladder habits. Genitourinary: No dysuria, trouble voiding, or hematuria. Musculoskeletal:  No gait disturbance, weakness or joint complaints. Integumentary: No rash or pruritis. Neurological: No headache, diplopia, change in muscle strength, numbness or tingling. No change in gait, balance, coordination, mood, affect, memory, mentation, behavior. Psychiatric: No anxiety, no depression. Endocrine: No malaise, fatigue or temperature intolerance. No excessive thirst, fluid intake, or urination. No tremor. Hematologic/Lymphatic: No abnormal bruising or bleeding, blood clots or swollen lymph nodes. Allergic/Immunologic: No nasal congestion or hives.     Physical Examination:    Vitals:    10/11/22 1133   BP: 112/68   Site: Right Upper Arm   Position: Sitting   Cuff Size: Medium Adult   Pulse: 94   SpO2: 97%   Weight: 150 lb (68 kg)   Height: 5' 10\" (1.778 m)        Constitutional and General Appearance: Warm and dry, no apparent distress, normal coloration  HEENT:  Normocephalic, atraumatic  Respiratory:  Normal excursion and expansion without use of accessory muscles  Resp Auscultation: Normal breath sounds without dullness  Cardiovascular: The apical impulses not displaced  Heart tones are crisp and normal  JVP normal cm H2O  Regular rate and rhythm  Peripheral pulses are symmetrical and full  There is no clubbing, cyanosis of the extremities.   no edema  Pedal Pulses: 2+ and equal   Abdomen:   No masses or tenderness  Liver/Spleen: No Abnormalities Noted  Neurological/Psychiatric:  Alert and oriented in all spheres  Moves all extremities well  Exhibits normal gait balance and coordination  No abnormalities of mood, affect, memory, mentation, or behavior are noted    Lab Data:    CBC:   Lab Results   Component Value Date/Time    WBC 5.7 06/27/2022 01:00 PM    WBC 5.0 05/25/2022 11:34 AM    WBC 4.6 01/21/2022 10:48 AM    RBC 4.34 06/27/2022 01:00 PM    RBC 4.63 05/25/2022 11:34 AM    RBC 4.27 01/21/2022 10:48 AM    HGB 14.4 06/27/2022 01:00 PM    HGB 15.1 05/25/2022 11:34 AM    HGB 13.8 01/21/2022 10:48 AM    HCT 42.5 06/27/2022 01:00 PM    HCT 44.6 05/25/2022 11:34 AM    HCT 41.4 01/21/2022 10:48 AM    MCV 97.9 06/27/2022 01:00 PM    MCV 96.4 05/25/2022 11:34 AM    MCV 97.1 01/21/2022 10:48 AM    RDW 15.5 06/27/2022 01:00 PM    RDW 16.1 05/25/2022 11:34 AM    RDW 15.6 01/21/2022 10:48 AM     06/27/2022 01:00 PM     05/25/2022 11:34 AM     01/21/2022 10:48 AM     BMP:  Lab Results   Component Value Date/Time     07/25/2022 01:34 PM     06/27/2022 01:00 PM     05/25/2022 11:34 AM    K 4.4 07/25/2022 01:34 PM    K 4.6 06/27/2022 01:00 PM    K 4.5 05/25/2022 11:34 AM    K 4.1 01/21/2022 10:48 AM    K 4.3 02/24/2020 06:28 PM  07/25/2022 01:34 PM     06/27/2022 01:00 PM     05/25/2022 11:34 AM    CO2 22 07/25/2022 01:34 PM    CO2 28 06/27/2022 01:00 PM    CO2 21 05/25/2022 11:34 AM    BUN 12 07/25/2022 01:34 PM    BUN 15 06/27/2022 01:00 PM    BUN 13 05/25/2022 11:34 AM    CREATININE 0.9 07/25/2022 01:34 PM    CREATININE 1.2 06/27/2022 01:00 PM    CREATININE 0.9 05/25/2022 11:34 AM     BNP:   Lab Results   Component Value Date/Time    PROBNP 506 07/25/2022 01:34 PM    PROBNP 1,588 06/27/2022 01:00 PM    PROBNP 654 03/04/2022 01:55 PM     Cardiac Imaging:  Echo 3/30/22  Left ventricular size is severely increased. Severely dilated left ventricle. increased left ventricular wall thickness. Severely decreased ejection fraction. EF is estimated to be 15-20% GLS= -5  Moderate mitral regurgitation is present. The right ventricle is enlarged. Right ventricular systolic function is mildly reduced . TAPSE= 1.99 RV S'= 9.99  Mild tricuspid regurgitation. Systolic pulmonary artery pressure (SPAP) estimated at 21 mmHg (RA pressure 3 mmHg). Echo 9/14/2021   Summary   -Left ventricular cavity size is severely dilated with normal left   ventricular wall thickness.   -Overall left ventricular systolic function appears severely reduced. Ejection fraction is visually estimated to be 15-20%. There is severe   diffuse hypokinesis. (Jimenez's EF: 18%, Heart model EF: 19%). -Global longitudinal strain: -5% (abnormal). -The right ventricle is moderately enlarged. Right ventricular systolic   function is mild-moderately reduced. 2/27/2020 Cardiac Cath : Dr Oli Mcadams  Anatomy:   LM-Normal   LAD-Normal  Cx-Normal   OM- Normal   RCA-Dominant  RPDA- Normal      LVEDP- 10     Impression  ~Coronary Angiography w/ Normal Coronaries  ~Normal LVEDP  ~Non ischemic Cardiomyopathy    Echo 2/25/2020  Summary   Left ventricular cavity size is mild to moderately dilated. There is mild concentric left ventricular hypertrophy.    Overall, left ventricular systolic function is severely depressed. Ejection fraction is visually estimated to be 15-20%. (Jimenez's=19%)   Severe global hypokinesis. Grade II diastolic dysfunction with elevated LV filling pressures. Mild to moderate mitral regurgitation. The left atrium is mildly dilated. Mild to moderate tricuspid regurgitation with PASP of 25 mmHg    Assessment:    1. Chronic systolic heart failure (HCC) on arni, BB, aldosterone antagonist and farxiga   2. Essential hypertension    3. Vitamin d deficiency   4.  Smoker none currently       Plan:   Stop aldactone and start eplenenone 12.5 mg once a day  Start digoxin 125 mcg mon wed and Friday  Check blood work today  Continue everything else  Echo in December  RTO in feb    I appreciate the opportunity of cooperating in the care of this individual.    Ced Reynoso, CHICHI - CNS, CNS, 10/11/2022, 11:40 AM

## 2022-10-12 ENCOUNTER — TELEPHONE (OUTPATIENT)
Dept: CARDIOLOGY CLINIC | Age: 57
End: 2022-10-12

## 2022-10-12 DIAGNOSIS — R73.9 HYPERGLYCEMIA: Primary | ICD-10-CM

## 2022-10-12 NOTE — TELEPHONE ENCOUNTER
----- Message from CHICHI Felix - CNS sent at 10/12/2022  9:58 AM EDT -----  I spoke to patient about his labs  BS elevated and he did not eat before labs    Please call lab and see if they can add A1c to labs from yesterday  Thanks  Spoke to lab and they can not add the A1C they dont have the correct tube for it.

## 2022-11-01 ENCOUNTER — TELEPHONE (OUTPATIENT)
Dept: CARDIOLOGY CLINIC | Age: 57
End: 2022-11-01

## 2023-02-07 NOTE — PROGRESS NOTES
[address]   Electrophysiology Follow up   Date: 2/8/2023  I had the privilege of visiting Vicky Elias in the office. CC: ICD discussion   HPI: Vicky Elias is a 62 y.o. male history of non compliance with medications, Asthma,  uncontrolled HTN, HLD Systolic heart failure,  alcohol and smokes. Hospitalized 2/24-27/2020 for acute sHF with LVEF of 15-20%, diuresed; uncontrolled HTN due to running out of insurance and stopping his medications; ABRAN on CKD. entresto 4/14/2021     Interval History:  Deirdre Connolly presents to the office in follow up. Reviewed limited echo that he had prior to his appointment today which showed continued reduced EF, despite GDMT,  which we would recommend placement of ICD. Patient denies lightheadedness, dizziness, chest pain, palpitations, orthopnea, edema, presyncope or syncope. Assessment and plan:   Chronic Systolic Heart failure and cardiomyopathy              - LVEF 02/25/2020 15-20%              - LVEF  09/14/2021 15-20%               - Continue GDMT with Lasix 40 mg, entresto 97/103 BID, carvedilol 25 mg BID, and farxiga 10 mg daily               - Cath 02/2020 - normal coronaries               - Follows with Heart Failure   - Echo 2/8/2023 was reviewed by me and still shows severely reduced LV function   -Patient is on guideline directed medical therapy for more than three months.  -Decision aid tool for patient considering ICD implantation for primary prevention \"[place] Program for Patient-Centered Decision\" were used for shared decision making and a copy was given to patient for review.     -Patient has a resonable expectation of survival of more than one year.   -Patient is a candidate for AICD implantation for primary prevention*  -I have discussed the procedure, risks, benefits and alternatives in detail with patient and family. I gave printed material about AICD implantation, risks and benefits.  The risks including, but not limited to, the risks of bleeding, infection, pain, device malfunction, lead dislodgement, radiation exposure, injury to cardiac and surrounding structures (including pneumothorax), stroke, cardiac perforation, tamponade, need for emergent heart surgery, myocardial infarction and death were discussed in detail. The patient opted to proceed with the device implantation. Single chamber ICD    HTN  -Controlled: 110/76  -BP goal <130/80  -Home BP monitoring encouraged, printed information provided on how to accurately measure BP at home.  -Counseled to follow a low salt diet to assure blood pressure remains controlled for cardiovascular risk factor modification.   -The patient is counseled to get regular exercise 3-5 times per week and maintain a healthy weight reduce cardiovascular risk factors. Plan:   Schedule single chamber ICD     Patient Active Problem List    Diagnosis Date Noted    HLD (hyperlipidemia) 09/27/2022    Chronic systolic CHF (congestive heart failure) ([facility] 06/25/2020    Essential hypertension 06/25/2020    Smoker 06/25/2020    Noncompliance 06/25/2020    Heart failure, unspecified ([facility] 02/24/2020     Diagnostic studies:   ECG 2/8/23  SR with frequent PVC's QTcH 418,QRS 96    Echo 2/8/2023  Severely reduced LV function    Echo 3/4/2022  Left ventricular size is severely increased. Severely dilated left ventricle. increased left ventricular wall thickness. Severely decreased ejection fraction. EF is estimated to be 15-20% GLS= -5  Moderate mitral regurgitation is present. The right ventricle is enlarged. Right ventricular systolic function is mildly reduced . TAPSE= 1.99 RV S'= 9.99  Mild tricuspid regurgitation. Systolic pulmonary artery pressure (SPAP) estimated at 21 mmHg (RA pressure 3 mmHg). Echo 09/14/2021     -Left ventricular cavity size is severely dilated with normal left   ventricular wall thickness.   -Overall left ventricular systolic function appears severely reduced.    Ejection fraction is visually estimated to be 15-20%. There is severe   diffuse hypokinesis. (Jimenez's EF: 18%, Heart model EF: 19%). -Global longitudinal strain: -5% (abnormal). -The right ventricle is moderately enlarged. Right ventricular systolic   function is mild-moderately reduced. Echo 02/25/2020     Left ventricular cavity size is mild to moderately dilated. There is mild concentric left ventricular hypertrophy. Overall, left ventricular systolic function is severely depressed. Ejection fraction is visually estimated to be 15-20%. (Jimenez's=19%)   Severe global hypokinesis. Grade II diastolic dysfunction with elevated LV filling pressures. Mild to moderate mitral regurgitation. The left atrium is mildly dilated. Mild to moderate tricuspid regurgitation with PASP of 25 mmHg. [facility] 02/27/2020   ~Coronary Angiography w/ Normal Coronaries  ~Normal LVEDP  ~Non ischemic Cardiomyopathy          I independently reviewed the cardiac diagnostic studies, ECG and relevant imaging studies. Lab Results   Component Value Date    LVEF 18 03/30/2022     Lab Results   Component Value Date    TSH 2.39 02/24/2020         Physical Examination:  Vitals:    02/08/23 1509   BP: 110/76   Pulse: 89   SpO2: 97%      Wt Readings from Last 3 Encounters:   02/08/23 148 lb (67.1 kg)   10/11/22 150 lb (68 kg)   07/25/22 154 lb 3.2 oz (69.9 kg)       Constitutional: Oriented. No distress. Head: Normocephalic and atraumatic. Mouth/Throat: Oropharynx is clear and moist.   Eyes: Conjunctivae normal. EOM are normal.   Neck: Neck supple. No rigidity. No JVD present. Cardiovascular: Normal rate, regular rhythm, S1&S2. Pulmonary/Chest: Bilateral respiratory sounds. No wheezes, No rhonchi. Abdominal: Soft. Bowel sounds present. No distension, No tenderness. Musculoskeletal: No tenderness. No edema    Lymphadenopathy: Has no cervical adenopathy. Neurological: Alert and oriented.  Cranial nerve appears intact, No Gross deficit Skin: Skin is warm and dry. No rash noted. Psychiatric: Has a normal behavior       Review of System:  [x] Full ROS obtained and negative except as mentioned in HPI    Prior to Admission medications    Medication Sig Start Date End Date Taking? Authorizing Provider   digoxin (LANOXIN) 125 MCG tablet Take 1 tablet by mouth three times a week 2/8/23  Yes CHICHI Rios   eplerenone (INSPRA) 25 MG tablet Take 0.5 tablets by mouth daily 10/11/22  Yes CHICHI Orellana   atorvastatin (LIPITOR) 40 MG tablet Take 1 tablet by mouth daily 9/21/22  Yes Flori Coughlin MD   vitamin D3 (CHOLECALCIFEROL) 25 MCG (1000 UT) TABS tablet Take 1 tablet by mouth in the morning. 7/25/22  Yes CHICHI Orellana   sacubitril-valsartan (ENTRESTO)  MG per tablet Take 1 tablet by mouth 2 times daily 6/27/22  Yes Amanda Desir MD   carvedilol (COREG) 25 MG tablet Take 1 tablet by mouth 2 times daily 5/5/22  Yes CHICHI Rios   furosemide (LASIX) 20 MG tablet Take 2 tablets by mouth daily And an extra 20 mg with increased sob  Patient taking differently: Take 20 mg by mouth daily And an extra 20 mg with increased sob 5/5/22  Yes CHICHI Orellana   dapagliflozin (FARXIGA) 10 MG tablet Take 1 tablet by mouth every morning 2/8/23   CHICHI Rios       No Known Allergies    Social History:  Reviewed. reports that he has been smoking cigars. He has never used smokeless tobacco. He reports current alcohol use of about 4.2 standard drinks per week. He reports that he does not use drugs. Family History:  Reviewed. Reviewed. No family history of SCD. Relevant and available labs, and cardiovascular diagnostics reviewed. Reviewed. I independently reviewed relevant and available cardiac diagnostic tests ECG, CXR, Echo, Stress test, Device interrogation, Holter, CT scan.       Outside medical records via Care everywhere reviewed and summarized in H&P above. Complex medical condition with multiple medical problems affecting prognosis and outcome of EP interventions         - The patient is counseled to follow a low salt diet to assure blood pressure remains controlled for cardiovascular risk factor modification.   - The patient is counseled to avoid excess caffeine, and energy drinks as this may exacerbated ectopy and arrhythmia. - The patient is counseled to get regular exercise 3-5 times per week to control cardiovascular risk factors. All questions and concerns were addressed to the patient/family. Alternatives to my treatment were discussed. I have discussed the above stated plan and the patient verbalized understanding and agreed with the plan. Scribe attestation: This note was scribed in the presence of Myra Mederos MD by Marquis Corrigan RN      No att. providers found x     NOTE: This report was transcribed using voice recognition software. Every effort was made to ensure accuracy, however, inadvertent computerized transcription errors may be present.      Myra Mederos MD, [facility] [address] [facility]   Office: [phone]  Fax: [phone]

## 2023-02-08 ENCOUNTER — HOSPITAL ENCOUNTER (OUTPATIENT)
Dept: NON INVASIVE DIAGNOSTICS | Age: 58
Discharge: HOME | End: 2023-02-08
Payer: COMMERCIAL

## 2023-02-08 ENCOUNTER — OFFICE VISIT (OUTPATIENT)
Dept: CARDIOLOGY CLINIC | Age: 58
End: 2023-02-08
Payer: COMMERCIAL

## 2023-02-08 ENCOUNTER — HOSPITAL ENCOUNTER (OUTPATIENT)
Age: 58
Discharge: HOME | End: 2023-02-08
Payer: COMMERCIAL

## 2023-02-08 VITALS
DIASTOLIC BLOOD PRESSURE: 76 MMHG | BODY MASS INDEX: 21.19 KG/M2 | HEART RATE: 89 BPM | HEIGHT: 70 IN | SYSTOLIC BLOOD PRESSURE: 110 MMHG | WEIGHT: 148 LBS | OXYGEN SATURATION: 97 %

## 2023-02-08 DIAGNOSIS — I50.22 CHRONIC SYSTOLIC CHF (CONGESTIVE HEART FAILURE) (HCC): ICD-10-CM

## 2023-02-08 DIAGNOSIS — I10 ESSENTIAL HYPERTENSION: ICD-10-CM

## 2023-02-08 DIAGNOSIS — I42.0 DILATED CARDIOMYOPATHY (HCC): Primary | ICD-10-CM

## 2023-02-08 DIAGNOSIS — I42.0 DILATED CARDIOMYOPATHY (HCC): ICD-10-CM

## 2023-02-08 DIAGNOSIS — I50.22 CHRONIC SYSTOLIC HEART FAILURE (HCC): ICD-10-CM

## 2023-02-08 LAB
ANION GAP SERPL CALCULATED.3IONS-SCNC: 12 MMOL/L (ref 3–16)
BUN BLDV-MCNC: 15 MG/DL (ref 7–20)
CALCIUM SERPL-MCNC: 9.3 MG/DL (ref 8.3–10.6)
CHLORIDE BLD-SCNC: 101 MMOL/L (ref 99–110)
CO2: 25 MMOL/L (ref 21–32)
CREAT SERPL-MCNC: 1.2 MG/DL (ref 0.9–1.3)
GFR SERPL CREATININE-BSD FRML MDRD: >60 ML/MIN/{1.73_M2}
GLUCOSE BLD-MCNC: 126 MG/DL (ref 70–99)
LV EF: 28 %
LVEF MODALITY: NORMAL
POTASSIUM SERPL-SCNC: 4 MMOL/L (ref 3.5–5.1)
PRO-BNP: 467 PG/ML (ref 0–124)
SODIUM BLD-SCNC: 138 MMOL/L (ref 136–145)

## 2023-02-08 PROCEDURE — G8427 DOCREV CUR MEDS BY ELIG CLIN: HCPCS | Performed by: INTERNAL MEDICINE

## 2023-02-08 PROCEDURE — 99214 OFFICE O/P EST MOD 30 MIN: CPT | Performed by: INTERNAL MEDICINE

## 2023-02-08 PROCEDURE — G8420 CALC BMI NORM PARAMETERS: HCPCS | Performed by: INTERNAL MEDICINE

## 2023-02-08 PROCEDURE — 3078F DIAST BP <80 MM HG: CPT | Performed by: INTERNAL MEDICINE

## 2023-02-08 PROCEDURE — 3017F COLORECTAL CA SCREEN DOC REV: CPT | Performed by: INTERNAL MEDICINE

## 2023-02-08 PROCEDURE — 80048 BASIC METABOLIC PNL TOTAL CA: CPT

## 2023-02-08 PROCEDURE — 4004F PT TOBACCO SCREEN RCVD TLK: CPT | Performed by: INTERNAL MEDICINE

## 2023-02-08 PROCEDURE — G8484 FLU IMMUNIZE NO ADMIN: HCPCS | Performed by: INTERNAL MEDICINE

## 2023-02-08 PROCEDURE — 93308 TTE F-UP OR LMTD: CPT

## 2023-02-08 PROCEDURE — 3074F SYST BP LT 130 MM HG: CPT | Performed by: INTERNAL MEDICINE

## 2023-02-08 PROCEDURE — 93000 ELECTROCARDIOGRAM COMPLETE: CPT | Performed by: INTERNAL MEDICINE

## 2023-02-08 PROCEDURE — 36415 COLL VENOUS BLD VENIPUNCTURE: CPT

## 2023-02-08 PROCEDURE — 83880 ASSAY OF NATRIURETIC PEPTIDE: CPT

## 2023-02-08 PROCEDURE — 85025 COMPLETE CBC W/AUTO DIFF WBC: CPT

## 2023-02-08 NOTE — LETTER
[facility]  EP Procedure Sheet    2/8/23  Radha Perez  1965  EP Procedures  [] Pacemaker implant (single/dual) [] EP Study   [x] ICD implant (single) [] Atrial flutter ablation (OTTO Y/N)   [] Biv implant ICD [] Tilt Table   [] Biv implant PPM [] Atrial fibrillation ablation (OTTO Yes)   [] Generator Change (PPM/ICD/BiV) [] SVT ablation   [] Lead revision (RV/LA/RA) (<1 month) [] PVC ablation     [] Lead extraction +/- upgrade (BiV/PPM/ICD) [] VT Ischemic/ non-ischemic   [] Loop implant/ removal [] VT RVOT   [] Cardioversion [] VT Left sided   [] OTTO [] AVN ablation   Equipment  [x] NephroGenex  [] KATHRYN Mapping System   [] [address] [] [address]   [] [employer] [] CryoAblation   [] Biotronik [] Laser Lead Extraction   EP Procedures Scheduling Request  # hours Requested  []1 []2 []2-4 [] 4-6 Scheduled  Date:   Specific Day  Completed    Anesthesia []yes []no F/u Date:   CT surgery backup []yes []no     Overnight stay      Performing MD []RMM [x]MXA   []MKW [] CMV First vs repeat   []1st [] 2nd [] 3rd   Pre-Procedure Labs / Imaging  [] PT/INR [] Type & cross   [] CBC [] Units PRBC   [] BMP/Mg [] Units FFP   [] Venogram [] Cardiac CTA for Pulmonary vein mapping     RN INITIALS: RA    Patient Instructions  Do not eat or drink after midnight the night prior to procedure  Dx:NICM ICD-10 code: I42.8 Benzoyl Peroxide Pregnancy And Lactation Text: This medication is Pregnancy Category C. It is unknown if benzoyl peroxide is excreted in breast milk.

## 2023-02-09 LAB
BASOPHILS ABSOLUTE: 0.1 K/UL (ref 0–0.2)
BASOPHILS RELATIVE PERCENT: 1.1 %
EOSINOPHILS ABSOLUTE: 0 K/UL (ref 0–0.6)
EOSINOPHILS RELATIVE PERCENT: 0.6 %
HCT VFR BLD CALC: 45.8 % (ref 40.5–52.5)
HEMOGLOBIN: 15.2 G/DL (ref 13.5–17.5)
LYMPHOCYTES ABSOLUTE: 2.4 K/UL (ref 1–5.1)
LYMPHOCYTES RELATIVE PERCENT: 38.2 %
MCH RBC QN AUTO: 33.4 PG (ref 26–34)
MCHC RBC AUTO-ENTMCNC: 33.3 G/DL (ref 31–36)
MCV RBC AUTO: 100.5 FL (ref 80–100)
MONOCYTES ABSOLUTE: 0.5 K/UL (ref 0–1.3)
MONOCYTES RELATIVE PERCENT: 8.3 %
NEUTROPHILS ABSOLUTE: 3.3 K/UL (ref 1.7–7.7)
NEUTROPHILS RELATIVE PERCENT: 51.8 %
PDW BLD-RTO: 14.8 % (ref 12.4–15.4)
PLATELET # BLD: 104 K/UL (ref 135–450)
PLATELET SLIDE REVIEW: ABNORMAL
PMV BLD AUTO: 10.9 FL (ref 5–10.5)
RBC # BLD: 4.55 M/UL (ref 4.2–5.9)
WBC # BLD: 6.4 K/UL (ref 4–11)

## 2023-02-13 ENCOUNTER — OFFICE VISIT (OUTPATIENT)
Dept: CARDIOLOGY CLINIC | Age: 58
End: 2023-02-13
Payer: COMMERCIAL

## 2023-02-13 VITALS
DIASTOLIC BLOOD PRESSURE: 80 MMHG | SYSTOLIC BLOOD PRESSURE: 126 MMHG | HEART RATE: 91 BPM | HEIGHT: 70 IN | WEIGHT: 144 LBS | OXYGEN SATURATION: 97 % | BODY MASS INDEX: 20.62 KG/M2

## 2023-02-13 DIAGNOSIS — I50.22 CHRONIC SYSTOLIC CHF (CONGESTIVE HEART FAILURE) (HCC): Primary | ICD-10-CM

## 2023-02-13 DIAGNOSIS — F17.200 SMOKER: ICD-10-CM

## 2023-02-13 DIAGNOSIS — E55.9 VITAMIN D DEFICIENCY: ICD-10-CM

## 2023-02-13 DIAGNOSIS — I10 ESSENTIAL HYPERTENSION: ICD-10-CM

## 2023-02-13 PROCEDURE — 99214 OFFICE O/P EST MOD 30 MIN: CPT | Performed by: CLINICAL NURSE SPECIALIST

## 2023-02-13 PROCEDURE — G8420 CALC BMI NORM PARAMETERS: HCPCS | Performed by: CLINICAL NURSE SPECIALIST

## 2023-02-13 PROCEDURE — 3017F COLORECTAL CA SCREEN DOC REV: CPT | Performed by: CLINICAL NURSE SPECIALIST

## 2023-02-13 PROCEDURE — 3079F DIAST BP 80-89 MM HG: CPT | Performed by: CLINICAL NURSE SPECIALIST

## 2023-02-13 PROCEDURE — G8484 FLU IMMUNIZE NO ADMIN: HCPCS | Performed by: CLINICAL NURSE SPECIALIST

## 2023-02-13 PROCEDURE — G8427 DOCREV CUR MEDS BY ELIG CLIN: HCPCS | Performed by: CLINICAL NURSE SPECIALIST

## 2023-02-13 PROCEDURE — 4004F PT TOBACCO SCREEN RCVD TLK: CPT | Performed by: CLINICAL NURSE SPECIALIST

## 2023-02-13 PROCEDURE — 3074F SYST BP LT 130 MM HG: CPT | Performed by: CLINICAL NURSE SPECIALIST

## 2023-02-13 RX ORDER — MELATONIN
1000 DAILY
Qty: 90 TABLET | Refills: 1 | Status: SHIPPED | OUTPATIENT
Start: 2023-02-13

## 2023-02-13 RX ORDER — EPLERENONE 25 MG/1
25 TABLET, FILM COATED ORAL DAILY
Qty: 90 TABLET | Refills: 1 | Status: SHIPPED | OUTPATIENT
Start: 2023-02-13

## 2023-02-13 NOTE — PROGRESS NOTES
[facility]  Progress Note    Primary Care Doctor:  Marice Severin, MD    Chief Complaint   Patient presents with    Follow-up    Congestive Heart Failure        History of Present Illness:  62 y.o. male with history of non compliance with medications, uncontrolled HTN, alcohol and smokes  2/24-27/2020 for acute sHF with LVEF of 15-20%, diuresed; uncontrolled HTN due to running out of insurance and stopping his medications; ABRAN on CKD  entresto 4/14/2021    I had the pleasure of seeing Read Boot in follow up for systolic heart failure. He is ambulatory and by his self. He states he is taking all his medications, LVEF improved to 25-30%. In reviewing meds, he is only taking the entresto once a day (for several months, not sure why) and inspra a full pill. He denies any chest pain, palpitations, lightheadedness or shortness of breath. He saw Dr Miriam Beth and is planning an ICD. Labs reviewed and bnp elevated compared to previous. Past Medical History:   has a past medical history of Acute on chronic systolic (congestive) heart failure (HCC), Acute systolic CHF (congestive heart failure), NYHA class 3 ([address] and Asthma. Surgical History:   has a past surgical history that includes hernia repair. Social History:   reports that he has been smoking cigars. He has never used smokeless tobacco. He reports current alcohol use of about 4.2 standard drinks per week. He reports that he does not use drugs. Family History:   Family History   Problem Relation Age of Onset    High Blood Pressure Mother        Home Medications:  Prior to Admission medications    Medication Sig Start Date End Date Taking?  Authorizing Provider   digoxin (LANOXIN) 125 MCG tablet Take 1 tablet by mouth three times a week 2/8/23  Yes Mirna Shane, APRN - CNS   dapagliflozin (FARXIGA) 10 MG tablet Take 1 tablet by mouth every morning 2/8/23  Yes CHICHI Shell - CNS   eplerenone (INSPRA) 25 MG tablet Take 0.5 tablets by mouth daily 10/11/22  Yes CHICHI Keys   atorvastatin (LIPITOR) 40 MG tablet Take 1 tablet by mouth daily 9/21/22  Yes Tyrel Hwang MD   vitamin D3 (CHOLECALCIFEROL) 25 MCG (1000 UT) TABS tablet Take 1 tablet by mouth in the morning. 7/25/22  Yes CHICHI Anh   sacubitril-valsartan (ENTRESTO)  MG per tablet Take 1 tablet by mouth 2 times daily 6/27/22  Yes Christopher Benitez MD   carvedilol (COREG) 25 MG tablet Take 1 tablet by mouth 2 times daily 5/5/22  Yes CHICHI Keys   furosemide (LASIX) 20 MG tablet Take 2 tablets by mouth daily And an extra 20 mg with increased sob  Patient taking differently: Take 20 mg by mouth daily And an extra 20 mg with increased sob 5/5/22  Yes CHICHI Keys        Allergies:  Patient has no known allergies. Review of Systems:   Constitutional: there has been no unanticipated weight loss. There's been no change in energy level, sleep pattern, or activity level. Eyes: No visual changes or diplopia. No scleral icterus. ENT: No Headaches, hearing loss or vertigo. No mouth sores or sore throat. Cardiovascular: Reviewed in HPI  Respiratory: No cough or wheezing, no sputum production. No hematemesis. Gastrointestinal: No abdominal pain, appetite loss, blood in stools. No change in bowel or bladder habits. Genitourinary: No dysuria, trouble voiding, or hematuria. Musculoskeletal:  No gait disturbance, weakness or joint complaints. Integumentary: No rash or pruritis. Neurological: No headache, diplopia, change in muscle strength, numbness or tingling. No change in gait, balance, coordination, mood, affect, memory, mentation, behavior. Psychiatric: No anxiety, no depression. Endocrine: No malaise, fatigue or temperature intolerance. No excessive thirst, fluid intake, or urination. No tremor.   Hematologic/Lymphatic: No abnormal bruising or bleeding, blood clots or swollen lymph nodes. Allergic/Immunologic: No nasal congestion or hives. Physical Examination:    Vitals:    02/13/23 1139   BP: 126/80   Site: Right Upper Arm   Position: Sitting   Cuff Size: Medium Adult   Pulse: 91   SpO2: 97%   Weight: 144 lb (65.3 kg)   Height: 5' 10\" (1.778 m)        Constitutional and General Appearance: Warm and dry, no apparent distress, normal coloration  HEENT:  Normocephalic, atraumatic  Respiratory:  Normal excursion and expansion without use of accessory muscles  Resp Auscultation: Normal breath sounds without dullness  Cardiovascular: The apical impulses not displaced  Heart tones are crisp and normal  JVP normal cm H2O  Regular rate and rhythm  Peripheral pulses are symmetrical and full  There is no clubbing, cyanosis of the extremities.   no edema  Pedal Pulses: 2+ and equal   Abdomen:   No masses or tenderness  Liver/Spleen: No Abnormalities Noted  Neurological/Psychiatric:  Alert and oriented in all spheres  Moves all extremities well  Exhibits normal gait balance and coordination  No abnormalities of mood, affect, memory, mentation, or behavior are noted    Lab Data:    CBC:   Lab Results   Component Value Date/Time    WBC 6.4 02/08/2023 04:00 PM    WBC 5.7 06/27/2022 01:00 PM    WBC 5.0 05/25/2022 11:34 AM    RBC 4.55 02/08/2023 04:00 PM    RBC 4.34 06/27/2022 01:00 PM    RBC 4.63 05/25/2022 11:34 AM    HGB 15.2 02/08/2023 04:00 PM    HGB 14.4 06/27/2022 01:00 PM    HGB 15.1 05/25/2022 11:34 AM    HCT 45.8 02/08/2023 04:00 PM    HCT 42.5 06/27/2022 01:00 PM    HCT 44.6 05/25/2022 11:34 AM    .5 02/08/2023 04:00 PM    MCV 97.9 06/27/2022 01:00 PM    MCV 96.4 05/25/2022 11:34 AM    RDW 14.8 02/08/2023 04:00 PM    RDW 15.5 06/27/2022 01:00 PM    RDW 16.1 05/25/2022 11:34 AM     02/08/2023 04:00 PM     06/27/2022 01:00 PM     05/25/2022 11:34 AM     BMP:  Lab Results   Component Value Date/Time     02/08/2023 04:00 PM     10/11/2022 12:23 PM     07/25/2022 01:34 PM    K 4.0 02/08/2023 04:00 PM    K 4.2 10/11/2022 12:23 PM    K 4.4 07/25/2022 01:34 PM    K 4.1 01/21/2022 10:48 AM    K 4.3 02/24/2020 06:28 PM     02/08/2023 04:00 PM     10/11/2022 12:23 PM     07/25/2022 01:34 PM    CO2 25 02/08/2023 04:00 PM    CO2 23 10/11/2022 12:23 PM    CO2 22 07/25/2022 01:34 PM    BUN 15 02/08/2023 04:00 PM    BUN 15 10/11/2022 12:23 PM    BUN 12 07/25/2022 01:34 PM    CREATININE 1.2 02/08/2023 04:00 PM    CREATININE 1.1 10/11/2022 12:23 PM    CREATININE 0.9 07/25/2022 01:34 PM     BNP:   Lab Results   Component Value Date/Time    PROBNP 467 02/08/2023 04:00 PM    PROBNP 72 10/11/2022 12:23 PM    PROBNP 506 07/25/2022 01:34 PM     Cardiac Imaging:  Echo 2/8/2023   Summary   -Limited exam per Tai Biggs CNP to evaluate left ventricular ejection   fraction. patient had a complete previous exam 3/30/22.   -Severely reduced global systolic function with an ejection fraction   estimated at 25-30%. -Global hypokinesis with inferior akinesis. -The left ventricle is mildly dilated. -Moderate concentric left ventricular hypertrophy    Echo 3/30/22  Left ventricular size is severely increased. Severely dilated left ventricle. increased left ventricular wall thickness. Severely decreased ejection fraction. EF is estimated to be 15-20% GLS= -5  Moderate mitral regurgitation is present. The right ventricle is enlarged. Right ventricular systolic function is mildly reduced . TAPSE= 1.99 RV S'= 9.99  Mild tricuspid regurgitation. Systolic pulmonary artery pressure (SPAP) estimated at 21 mmHg (RA pressure 3 mmHg). Echo 9/14/2021   Summary   -Left ventricular cavity size is severely dilated with normal left   ventricular wall thickness.   -Overall left ventricular systolic function appears severely reduced. Ejection fraction is visually estimated to be 15-20%. There is severe   diffuse hypokinesis. (Jimenez's EF: 18%, Heart model EF: 19%). -Global longitudinal strain: -5% (abnormal). -The right ventricle is moderately enlarged. Right ventricular systolic   function is mild-moderately reduced. 2/27/2020 Cardiac Cath : Dr Veronica Belcher  Anatomy:   LM-Normal   LAD-Normal  Cx-Normal   OM- Normal   RCA-Dominant  RPDA- Normal      LVEDP- 10     Impression  ~Coronary Angiography w/ Normal Coronaries  ~Normal LVEDP  ~Non ischemic Cardiomyopathy    Echo 2/25/2020  Summary   Left ventricular cavity size is mild to moderately dilated. There is mild concentric left ventricular hypertrophy. Overall, left ventricular systolic function is severely depressed. Ejection fraction is visually estimated to be 15-20%. (Jimenez's=19%)   Severe global hypokinesis. Grade II diastolic dysfunction with elevated LV filling pressures. Mild to moderate mitral regurgitation. The left atrium is mildly dilated. Mild to moderate tricuspid regurgitation with PASP of 25 mmHg    Assessment:    1. Chronic systolic heart failure (HCC) on arni, BB, aldosterone antagonist and farxiga with some improvement in LVEF   2. Essential hypertension    3. Vitamin d deficiency   4.  Smoker none currently       Plan:   Take your entresto twice a day  Continue all other medications   RTO in 3 months  Refilled inspra    I appreciate the opportunity of cooperating in the care of this individual.    CHICHI Gonzalez - CNS, CNS, 2/13/2023, 11:46 AM

## 2023-03-23 RX ORDER — DIGOXIN 125 MCG
125 TABLET ORAL
Qty: 36 TABLET | Refills: 1 | Status: SHIPPED | OUTPATIENT
Start: 2023-03-24

## 2023-03-23 RX ORDER — FUROSEMIDE 20 MG/1
TABLET ORAL
Qty: 90 TABLET | Refills: 1 | Status: SHIPPED | OUTPATIENT
Start: 2023-03-23

## 2023-03-23 RX ORDER — CARVEDILOL 25 MG/1
25 TABLET ORAL 2 TIMES DAILY
Qty: 180 TABLET | Refills: 3 | Status: SHIPPED | OUTPATIENT
Start: 2023-03-23

## 2023-03-23 RX ORDER — ATORVASTATIN CALCIUM 40 MG/1
40 TABLET, FILM COATED ORAL DAILY
Qty: 90 TABLET | Refills: 1 | Status: SHIPPED | OUTPATIENT
Start: 2023-03-23

## 2023-03-24 ENCOUNTER — HOSPITAL ENCOUNTER (OUTPATIENT)
Dept: CARDIAC CATH/INVASIVE PROCEDURES | Age: 58
Discharge: HOME | End: 2023-03-24
Attending: INTERNAL MEDICINE | Admitting: INTERNAL MEDICINE
Payer: COMMERCIAL

## 2023-03-24 ENCOUNTER — APPOINTMENT (OUTPATIENT)
Dept: GENERAL RADIOLOGY | Age: 58
End: 2023-03-24
Attending: INTERNAL MEDICINE
Payer: COMMERCIAL

## 2023-03-24 ENCOUNTER — NURSE ONLY (OUTPATIENT)
Dept: CARDIOLOGY CLINIC | Age: 58
End: 2023-03-24

## 2023-03-24 DIAGNOSIS — R52 PAIN IN PACEMAKER POCKET: Primary | ICD-10-CM

## 2023-03-24 LAB
ANION GAP SERPL CALCULATED.3IONS-SCNC: 9 MMOL/L (ref 3–16)
BUN SERPL-MCNC: 19 MG/DL (ref 7–20)
CALCIUM SERPL-MCNC: 9 MG/DL (ref 8.3–10.6)
CHLORIDE SERPL-SCNC: 107 MMOL/L (ref 99–110)
CO2 SERPL-SCNC: 24 MMOL/L (ref 21–32)
CREAT SERPL-MCNC: 1.1 MG/DL (ref 0.9–1.3)
DEPRECATED RDW RBC AUTO: 14.1 % (ref 12.4–15.4)
EKG ATRIAL RATE: 77 BPM
EKG DIAGNOSIS: NORMAL
EKG P AXIS: 85 DEGREES
EKG P-R INTERVAL: 136 MS
EKG Q-T INTERVAL: 406 MS
EKG QRS DURATION: 96 MS
EKG QTC CALCULATION (BAZETT): 459 MS
EKG R AXIS: 59 DEGREES
EKG T AXIS: 78 DEGREES
EKG VENTRICULAR RATE: 77 BPM
GFR SERPLBLD CREATININE-BSD FMLA CKD-EPI: >60 ML/MIN/{1.73_M2}
GLUCOSE SERPL-MCNC: 113 MG/DL (ref 70–99)
HCT VFR BLD AUTO: 40.4 % (ref 40.5–52.5)
HGB BLD-MCNC: 13.8 G/DL (ref 13.5–17.5)
MCH RBC QN AUTO: 33.8 PG (ref 26–34)
MCHC RBC AUTO-ENTMCNC: 34.3 G/DL (ref 31–36)
MCV RBC AUTO: 98.7 FL (ref 80–100)
PLATELET # BLD AUTO: 115 K/UL (ref 135–450)
PMV BLD AUTO: 10.1 FL (ref 5–10.5)
POTASSIUM SERPL-SCNC: 3.9 MMOL/L (ref 3.5–5.1)
RBC # BLD AUTO: 4.09 M/UL (ref 4.2–5.9)
SODIUM SERPL-SCNC: 140 MMOL/L (ref 136–145)
WBC # BLD AUTO: 5.9 K/UL (ref 4–11)

## 2023-03-24 PROCEDURE — C1777 LEAD, AICD, ENDO SINGLE COIL: HCPCS

## 2023-03-24 PROCEDURE — 6360000002 HC RX W HCPCS

## 2023-03-24 PROCEDURE — 93010 ELECTROCARDIOGRAM REPORT: CPT | Performed by: INTERNAL MEDICINE

## 2023-03-24 PROCEDURE — 2580000003 HC RX 258

## 2023-03-24 PROCEDURE — C1892 INTRO/SHEATH,FIXED,PEEL-AWAY: HCPCS

## 2023-03-24 PROCEDURE — 99153 MOD SED SAME PHYS/QHP EA: CPT

## 2023-03-24 PROCEDURE — 99152 MOD SED SAME PHYS/QHP 5/>YRS: CPT

## 2023-03-24 PROCEDURE — 36415 COLL VENOUS BLD VENIPUNCTURE: CPT

## 2023-03-24 PROCEDURE — 99152 MOD SED SAME PHYS/QHP 5/>YRS: CPT | Performed by: INTERNAL MEDICINE

## 2023-03-24 PROCEDURE — 33249 INSJ/RPLCMT DEFIB W/LEAD(S): CPT

## 2023-03-24 PROCEDURE — 2500000003 HC RX 250 WO HCPCS

## 2023-03-24 PROCEDURE — 85027 COMPLETE CBC AUTOMATED: CPT

## 2023-03-24 PROCEDURE — 93005 ELECTROCARDIOGRAM TRACING: CPT | Performed by: INTERNAL MEDICINE

## 2023-03-24 PROCEDURE — C1722 AICD, SINGLE CHAMBER: HCPCS

## 2023-03-24 PROCEDURE — 71045 X-RAY EXAM CHEST 1 VIEW: CPT

## 2023-03-24 PROCEDURE — 33249 INSJ/RPLCMT DEFIB W/LEAD(S): CPT | Performed by: INTERNAL MEDICINE

## 2023-03-24 PROCEDURE — 80048 BASIC METABOLIC PNL TOTAL CA: CPT

## 2023-03-24 RX ORDER — OXYCODONE HYDROCHLORIDE AND ACETAMINOPHEN 5; 325 MG/1; MG/1
1 TABLET ORAL EVERY 6 HOURS PRN
Qty: 12 TABLET | Refills: 0 | Status: SHIPPED | OUTPATIENT
Start: 2023-03-24 | End: 2023-03-27

## 2023-03-24 RX ORDER — SODIUM CHLORIDE 0.9 % (FLUSH) 0.9 %
5-40 SYRINGE (ML) INJECTION EVERY 12 HOURS SCHEDULED
Status: DISCONTINUED | OUTPATIENT
Start: 2023-03-24 | End: 2023-03-24 | Stop reason: HOSPADM

## 2023-03-24 RX ORDER — OXYCODONE HYDROCHLORIDE 5 MG/1
5 TABLET ORAL EVERY 4 HOURS PRN
Status: DISCONTINUED | OUTPATIENT
Start: 2023-03-24 | End: 2023-03-24 | Stop reason: HOSPADM

## 2023-03-24 RX ORDER — SODIUM CHLORIDE 9 MG/ML
INJECTION, SOLUTION INTRAVENOUS PRN
Status: DISCONTINUED | OUTPATIENT
Start: 2023-03-24 | End: 2023-03-24 | Stop reason: HOSPADM

## 2023-03-24 RX ORDER — SODIUM CHLORIDE 0.9 % (FLUSH) 0.9 %
5-40 SYRINGE (ML) INJECTION PRN
Status: DISCONTINUED | OUTPATIENT
Start: 2023-03-24 | End: 2023-03-24 | Stop reason: HOSPADM

## 2023-03-24 RX ORDER — OXYCODONE HYDROCHLORIDE 5 MG/1
10 TABLET ORAL EVERY 4 HOURS PRN
Status: DISCONTINUED | OUTPATIENT
Start: 2023-03-24 | End: 2023-03-24 | Stop reason: HOSPADM

## 2023-03-24 NOTE — H&P
[address]   Electrophysiology      CC: ICD discussion   HPI: Karol Vines is a 62 y.o. male history of non compliance with medications, Asthma,  uncontrolled HTN, HLD Systolic heart failure,  alcohol and smokes. Hospitalized 2/24-27/2020 for acute sHF with LVEF of 15-20%, diuresed; uncontrolled HTN due to running out of insurance and stopping his medications; ABRAN on CKD. entresto 4/14/2021     Interval History:    Reviewed limited echo that he had prior to his appointment today which showed continued reduced EF, despite GDMT,  which we would recommend placement of ICD. Patient denies lightheadedness, dizziness, chest pain, palpitations, orthopnea, edema, presyncope or syncope. Assessment and plan:   Chronic Systolic Heart failure and cardiomyopathy              - LVEF 02/25/2020 15-20%              - LVEF  09/14/2021 15-20%               - Continue GDMT with Lasix 40 mg, entresto 97/103 BID, carvedilol 25 mg BID, and farxiga 10 mg daily               - Cath 02/2020 - normal coronaries               - Follows with Heart Failure   - Echo 2/8/2023 was reviewed by me and still shows severely reduced LV function   -Patient is on guideline directed medical therapy for more than three months.  -Decision aid tool for patient considering ICD implantation for primary prevention \"[place] Program for Patient-Centered Decision\" were used for shared decision making and a copy was given to patient for review.     -Patient has a resonable expectation of survival of more than one year.   -Patient is a candidate for AICD implantation for primary prevention*  -I have discussed the procedure, risks, benefits and alternatives in detail with patient and family. I gave printed material about AICD implantation, risks and benefits.  The risks including, but not limited to, the risks of bleeding, infection, pain, device malfunction, lead dislodgement, radiation exposure, injury to cardiac and surrounding structures strain: -5% (abnormal). -The right ventricle is moderately enlarged. Right ventricular systolic   function is mild-moderately reduced. Echo 02/25/2020     Left ventricular cavity size is mild to moderately dilated. There is mild concentric left ventricular hypertrophy. Overall, left ventricular systolic function is severely depressed. Ejection fraction is visually estimated to be 15-20%. (Jimenez's=19%)   Severe global hypokinesis. Grade II diastolic dysfunction with elevated LV filling pressures. Mild to moderate mitral regurgitation. The left atrium is mildly dilated. Mild to moderate tricuspid regurgitation with PASP of 25 mmHg. [facility] 02/27/2020   ~Coronary Angiography w/ Normal Coronaries  ~Normal LVEDP  ~Non ischemic Cardiomyopathy          I independently reviewed the cardiac diagnostic studies, ECG and relevant imaging studies. Lab Results   Component Value Date    LVEF 18 03/30/2022     Lab Results   Component Value Date    TSH 2.39 02/24/2020         Physical Examination:  Vitals:    02/08/23 1509   BP: 110/76   Pulse: 89   SpO2: 97%      Wt Readings from Last 3 Encounters:   02/08/23 148 lb (67.1 kg)   10/11/22 150 lb (68 kg)   07/25/22 154 lb 3.2 oz (69.9 kg)       Constitutional: Oriented. No distress. Head: Normocephalic and atraumatic. Mouth/Throat: Oropharynx is clear and moist.   Eyes: Conjunctivae normal. EOM are normal.   Neck: Neck supple. No rigidity. No JVD present. Cardiovascular: Normal rate, regular rhythm, S1&S2. Pulmonary/Chest: Bilateral respiratory sounds. No wheezes, No rhonchi. Abdominal: Soft. Bowel sounds present. No distension, No tenderness. Musculoskeletal: No tenderness. No edema    Lymphadenopathy: Has no cervical adenopathy. Neurological: Alert and oriented. Cranial nerve appears intact, No Gross deficit   Skin: Skin is warm and dry. No rash noted.    Psychiatric: Has a normal behavior       Review of System:  [x] Full ROS obtained and

## 2023-03-24 NOTE — DISCHARGE INSTRUCTIONS
Post-Device Implant     1. Wound Care  -Bathe daily but keep incision dry and clean until your 7-10 day follow up  -Do not shower until you are told to do so by your physician.  -Do not remove the outer dressing unless it is soiled  -Allow the thin pieces of tape (Steri-Strips) to fall off naturally. Do not pick or pull at these unless instructed to do so by your physician. 2. Contact the cardiologists office for these concerns:   -Increased swelling and/or tenderness in incision and/or extending down the arm on the same side as implant site   -Feeling increased palpitations or irregular heart beat   -Redness of incision or drainage from incision.   -Bleeding that does not stop or increases.  -Skin pimples along incision.  -If a suture works its way through the incision.  -Fever greater than 100.5    3. Do not rub or twist the device site    4. Avoid lifting objects heavier than 10 pounds for one month. Do not raise the arm on the side where the device was implanted over your head for one month. These rules help to heal the implant site and stabilize the heart lead wires. 5. Avoid tight clothing over the incision. 6. No driving for one week or until initial visit after your procedure. 7. Carry your temporary Device  I.D. Card with you until your permanent card arrives in four to six weeks. An I. D. Card should be with you always. 8. If you have a defibrillator (AICD) and receive a shock or hear a tone from the device call the doctor's office    9. An implanted device does not replace any medications, diet or activity restrictions that you had before the implant. Check with your cardiologist regarding questions    10. Reasons to seek medical attention immediately: Call 911   -Sudden onset of chest pain, shortness of breath, dizziness, or loss of balance or coordination   -Sudden Change in vision   -Sudden confusion or trouble speaking and/or understanding    -Sudden onset of numbness or weakness of

## 2023-03-24 NOTE — PROCEDURES
[facility]     Electrophysiology Procedure Note       Date of Procedure: 3/24/2023  Patient's Name: Aftab Call  YOB: 1965   Medical Record Number: [id number]  Referring Physician: Marianne Ryder MD  Procedure Performed by: Marianne Ryder MD    Procedures performed:     Insertion of MRI compatible right ventricular  lead under fluoroscopy. Insertion of a MRI compatible   single chamber  ICD  IV sedation. Programming and analysis of the device      Indication of the procedure:    Aftab Call is a 62 y.o. male with      cardiomyopathy with EF less than 35% despite GDMT. Details of procedure: The patient was brought to the electrophysiology laboratory in stable condition. The patient was in a fasting and non-sedated state. The risks, benefits and alternatives of the procedure were discussed with the patient . The risks including, but not limited to, the risks of vascular injury, bleeding, infection, device malfunction, lead dislodgement, radiation exposure, injury to cardiac and surrounding structures (including pneumothorax), stroke, myocardial infarction and death were discussed in detail. Patient opted to proceed with the device implantation. Written informed consent was signed and placed in the chart. Prophylactic antibiotic was given. An independent trained observer pushed medications at my direction. We monitored the patient's level of consciousness and vital signs/physiologic status throughout the procedure duration (see start and stop times below). Sedation:  4 mg Versed, 200 mcg Fentanyl  Sedation start: 1121  Sedation stop: 1215         Access was done using ultrasound    The patient was prepped and draped in a sterile fashion. A timeout protocol was completed to identify the patient and the procedure being performed. Pre-sedation evaluation and airway assessment (Mallampatti classification, class lI) was completed.    IV sedation was provided with IV

## 2023-03-24 NOTE — LETTER
March 24, 2023       Surinder Garner YOB: 1965   [place] [id number] Date of Visit:  3/24/2023       To Whom It May Concern: It is my medical opinion that Surinder Garner may return to work on 4/3/23. No excessive keisha lifting or movement of left arm. If you have any questions or concerns, please don't hesitate to call.     Sincerely,        Dr Luna Doyle MD  [phone]        [address] LAB ROOM 3

## 2023-03-27 ENCOUNTER — TELEPHONE (OUTPATIENT)
Dept: CARDIOLOGY CLINIC | Age: 58
End: 2023-03-27

## 2023-03-27 NOTE — TELEPHONE ENCOUNTER
Miranda Culp stopped by the office to let Christian Holley know he got it done. He wants her to call him.     Thanks

## 2023-03-28 DIAGNOSIS — I50.22 CHRONIC SYSTOLIC CHF (CONGESTIVE HEART FAILURE) (HCC): ICD-10-CM

## 2023-03-28 DIAGNOSIS — I42.8 OTHER CARDIOMYOPATHY (HCC): ICD-10-CM

## 2023-03-28 DIAGNOSIS — Z95.810 ICD (IMPLANTABLE CARDIOVERTER-DEFIBRILLATOR), SINGLE, IN SITU: Primary | ICD-10-CM

## 2023-04-03 ENCOUNTER — NURSE ONLY (OUTPATIENT)
Dept: CARDIOLOGY CLINIC | Age: 58
End: 2023-04-03
Payer: COMMERCIAL

## 2023-04-03 DIAGNOSIS — Z95.810 ICD (IMPLANTABLE CARDIOVERTER-DEFIBRILLATOR), SINGLE, IN SITU: ICD-10-CM

## 2023-04-03 DIAGNOSIS — I50.22 CHRONIC SYSTOLIC CHF (CONGESTIVE HEART FAILURE) (HCC): ICD-10-CM

## 2023-04-03 DIAGNOSIS — I50.22 CHRONIC HFREF (HEART FAILURE WITH REDUCED EJECTION FRACTION) (HCC): Primary | ICD-10-CM

## 2023-04-03 DIAGNOSIS — I42.8 OTHER CARDIOMYOPATHY (HCC): ICD-10-CM

## 2023-04-03 PROCEDURE — 93282 PRGRMG EVAL IMPLANTABLE DFB: CPT | Performed by: INTERNAL MEDICINE

## 2023-04-03 NOTE — PROGRESS NOTES
Patient comes in for programming evaluation for his defibrillator. S/p Medtronic [id number] [place] XT VR MRI on 3/24/2023 with Dr. Monica Kimball    All sensing and pacing parameters are within normal range. Battery life 13.3 years  VS at 97 bpm today.  <0.1%. No episodes noted. Observations (1)  - Night heart rate over 85 bpm for 7 days. Patient remains on Coreg and digoxin. Patients incision is healing nicely. Wound clean, dry and intact. Patient educated on wound care. All questions answered. Patient to call the office immediately with any signs on infection. Wavelet collected and alerts turned on today. Please see interrogation for more detail. Optivol is initializing. Patient will follow up in 3 months in office or remotely. Home Monitor ordered today.

## 2023-04-03 NOTE — LETTER
[address] [facility]  Dept: [phone]  Dept Fax: [phone]      April 3, 2023    Patient: Eric Romo  : 1965  DOS: 4/3/2023    To Whom it May Concern: It is my medical opinion that Eric Romo should be on light duty/limited restrictions until 2023. After that date, he can resume full work without any restrictions. If you have any questions or concerns, please do not hesitate to call.     Sincerely,    CHICHI Stewart-CNP

## 2023-05-15 ENCOUNTER — TELEPHONE (OUTPATIENT)
Dept: CARDIOLOGY CLINIC | Age: 58
End: 2023-05-15

## 2023-05-15 NOTE — TELEPHONE ENCOUNTER
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     Pls advise thank you

## 2023-05-16 ENCOUNTER — OFFICE VISIT (OUTPATIENT)
Dept: CARDIOLOGY CLINIC | Age: 58
End: 2023-05-16
Payer: COMMERCIAL

## 2023-05-16 ENCOUNTER — HOSPITAL ENCOUNTER (OUTPATIENT)
Age: 58
Discharge: HOME | End: 2023-05-16
Payer: COMMERCIAL

## 2023-05-16 ENCOUNTER — NURSE ONLY (OUTPATIENT)
Dept: CARDIOLOGY CLINIC | Age: 58
End: 2023-05-16

## 2023-05-16 VITALS
WEIGHT: 143 LBS | BODY MASS INDEX: 20.47 KG/M2 | HEART RATE: 96 BPM | OXYGEN SATURATION: 98 % | DIASTOLIC BLOOD PRESSURE: 70 MMHG | HEIGHT: 70 IN | SYSTOLIC BLOOD PRESSURE: 136 MMHG

## 2023-05-16 DIAGNOSIS — E55.9 VITAMIN D DEFICIENCY: ICD-10-CM

## 2023-05-16 DIAGNOSIS — I50.22 CHRONIC SYSTOLIC CHF (CONGESTIVE HEART FAILURE) (HCC): ICD-10-CM

## 2023-05-16 DIAGNOSIS — I10 ESSENTIAL HYPERTENSION: ICD-10-CM

## 2023-05-16 DIAGNOSIS — I42.9 CARDIOMYOPATHY, UNSPECIFIED TYPE (HCC): ICD-10-CM

## 2023-05-16 DIAGNOSIS — Z95.810 ICD (IMPLANTABLE CARDIOVERTER-DEFIBRILLATOR), SINGLE, IN SITU: ICD-10-CM

## 2023-05-16 DIAGNOSIS — F17.200 SMOKER: ICD-10-CM

## 2023-05-16 DIAGNOSIS — I50.22 CHRONIC SYSTOLIC CHF (CONGESTIVE HEART FAILURE) (HCC): Primary | ICD-10-CM

## 2023-05-16 LAB
ANION GAP SERPL CALCULATED.3IONS-SCNC: 15 MMOL/L (ref 3–16)
BUN SERPL-MCNC: 13 MG/DL (ref 7–20)
CALCIUM SERPL-MCNC: 9.2 MG/DL (ref 8.3–10.6)
CHLORIDE SERPL-SCNC: 102 MMOL/L (ref 99–110)
CO2 SERPL-SCNC: 23 MMOL/L (ref 21–32)
CREAT SERPL-MCNC: 1 MG/DL (ref 0.9–1.3)
GFR SERPLBLD CREATININE-BSD FMLA CKD-EPI: >60 ML/MIN/{1.73_M2}
GLUCOSE SERPL-MCNC: 107 MG/DL (ref 70–99)
NT-PROBNP SERPL-MCNC: 247 PG/ML (ref 0–124)
POTASSIUM SERPL-SCNC: 4.3 MMOL/L (ref 3.5–5.1)
SODIUM SERPL-SCNC: 140 MMOL/L (ref 136–145)
TSH SERPL DL<=0.005 MIU/L-ACNC: 0.95 UIU/ML (ref 0.27–4.2)

## 2023-05-16 PROCEDURE — 99214 OFFICE O/P EST MOD 30 MIN: CPT | Performed by: CLINICAL NURSE SPECIALIST

## 2023-05-16 PROCEDURE — 3017F COLORECTAL CA SCREEN DOC REV: CPT | Performed by: CLINICAL NURSE SPECIALIST

## 2023-05-16 PROCEDURE — 83880 ASSAY OF NATRIURETIC PEPTIDE: CPT

## 2023-05-16 PROCEDURE — 84443 ASSAY THYROID STIM HORMONE: CPT

## 2023-05-16 PROCEDURE — 3075F SYST BP GE 130 - 139MM HG: CPT | Performed by: CLINICAL NURSE SPECIALIST

## 2023-05-16 PROCEDURE — G8420 CALC BMI NORM PARAMETERS: HCPCS | Performed by: CLINICAL NURSE SPECIALIST

## 2023-05-16 PROCEDURE — 4004F PT TOBACCO SCREEN RCVD TLK: CPT | Performed by: CLINICAL NURSE SPECIALIST

## 2023-05-16 PROCEDURE — 3078F DIAST BP <80 MM HG: CPT | Performed by: CLINICAL NURSE SPECIALIST

## 2023-05-16 PROCEDURE — 80048 BASIC METABOLIC PNL TOTAL CA: CPT

## 2023-05-16 PROCEDURE — 93290 INTERROG DEV EVAL ICPMS IP: CPT | Performed by: CLINICAL NURSE SPECIALIST

## 2023-05-16 PROCEDURE — G8427 DOCREV CUR MEDS BY ELIG CLIN: HCPCS | Performed by: CLINICAL NURSE SPECIALIST

## 2023-05-16 PROCEDURE — 36415 COLL VENOUS BLD VENIPUNCTURE: CPT

## 2023-05-16 RX ORDER — EPLERENONE 25 MG/1
25 TABLET, FILM COATED ORAL DAILY
Qty: 90 TABLET | Refills: 1 | Status: SHIPPED | OUTPATIENT
Start: 2023-05-16

## 2023-05-16 NOTE — PATIENT INSTRUCTIONS
Continue all current medications  Check blood work  Refills done  Check BP couple times a week  RTO in 4-5 months

## 2023-05-16 NOTE — PROGRESS NOTES
Pulse: (!) 102 96   SpO2: 98%    Weight: 143 lb (64.9 kg)    Height: 5' 10\" (1.778 m)           Constitutional and General Appearance: Warm and dry, no apparent distress, normal coloration  HEENT:  Normocephalic, atraumatic  Respiratory:  Normal excursion and expansion without use of accessory muscles  Resp Auscultation: Normal breath sounds without dullness  Cardiovascular: The apical impulses not displaced  Heart tones are crisp and normal  JVP normal cm H2O  Regular rate and rhythm  Peripheral pulses are symmetrical and full  There is no clubbing, cyanosis of the extremities.   no edema  Pedal Pulses: 2+ and equal   Abdomen:   No masses or tenderness  Liver/Spleen: No Abnormalities Noted  Neurological/Psychiatric:  Alert and oriented in all spheres  Moves all extremities well  Exhibits normal gait balance and coordination  No abnormalities of mood, affect, memory, mentation, or behavior are noted    Lab Data:    CBC:   Lab Results   Component Value Date/Time    WBC 5.9 03/24/2023 10:00 AM    WBC 6.4 02/08/2023 04:00 PM    WBC 5.7 06/27/2022 01:00 PM    RBC 4.09 03/24/2023 10:00 AM    RBC 4.55 02/08/2023 04:00 PM    RBC 4.34 06/27/2022 01:00 PM    HGB 13.8 03/24/2023 10:00 AM    HGB 15.2 02/08/2023 04:00 PM    HGB 14.4 06/27/2022 01:00 PM    HCT 40.4 03/24/2023 10:00 AM    HCT 45.8 02/08/2023 04:00 PM    HCT 42.5 06/27/2022 01:00 PM    MCV 98.7 03/24/2023 10:00 AM    .5 02/08/2023 04:00 PM    MCV 97.9 06/27/2022 01:00 PM    RDW 14.1 03/24/2023 10:00 AM    RDW 14.8 02/08/2023 04:00 PM    RDW 15.5 06/27/2022 01:00 PM     03/24/2023 10:00 AM     02/08/2023 04:00 PM     06/27/2022 01:00 PM     BMP:  Lab Results   Component Value Date/Time     03/24/2023 10:00 AM     02/08/2023 04:00 PM     10/11/2022 12:23 PM    K 3.9 03/24/2023 10:00 AM    K 4.0 02/08/2023 04:00 PM    K 4.2 10/11/2022 12:23 PM    K 4.1 01/21/2022 10:48 AM    K 4.3 02/24/2020 06:28 PM

## 2023-05-17 ENCOUNTER — TELEPHONE (OUTPATIENT)
Dept: CARDIOLOGY CLINIC | Age: 58
End: 2023-05-17

## 2023-05-17 NOTE — TELEPHONE ENCOUNTER
----- Message from Thad Prader, APRN - CNS sent at 5/17/2023  8:34 AM EDT -----  Blood work is good and thyroid test is normal  Continue all medications  thanks

## 2023-05-17 NOTE — PROGRESS NOTES
Linda [address] transmission received 5/16/23 from [address] for patient's single chamber ICD. Transmission shows normal sensing and pacing function. NSVT noted (coreg). Optivol/ TI are within normal range. NP will review. See interrogation under cardiology tab in the [address] [place] [address] [address] field for more details.  Will continue to monitor remotely.    (JOSE)

## 2023-05-26 ENCOUNTER — TELEPHONE (OUTPATIENT)
Dept: CARDIOLOGY CLINIC | Age: 58
End: 2023-05-26

## 2023-05-26 ENCOUNTER — OFFICE VISIT (OUTPATIENT)
Dept: CARDIOLOGY CLINIC | Age: 58
End: 2023-05-26
Payer: COMMERCIAL

## 2023-05-26 VITALS
WEIGHT: 137 LBS | BODY MASS INDEX: 19.61 KG/M2 | HEIGHT: 70 IN | DIASTOLIC BLOOD PRESSURE: 70 MMHG | SYSTOLIC BLOOD PRESSURE: 126 MMHG | OXYGEN SATURATION: 95 % | HEART RATE: 90 BPM

## 2023-05-26 DIAGNOSIS — I10 ESSENTIAL HYPERTENSION: ICD-10-CM

## 2023-05-26 DIAGNOSIS — E55.9 VITAMIN D DEFICIENCY: ICD-10-CM

## 2023-05-26 DIAGNOSIS — F10.10 ALCOHOL ABUSE: ICD-10-CM

## 2023-05-26 DIAGNOSIS — I50.22 CHRONIC SYSTOLIC CHF (CONGESTIVE HEART FAILURE) (HCC): Primary | ICD-10-CM

## 2023-05-26 DIAGNOSIS — Z95.810 ICD (IMPLANTABLE CARDIOVERTER-DEFIBRILLATOR), SINGLE, IN SITU: ICD-10-CM

## 2023-05-26 PROCEDURE — 3074F SYST BP LT 130 MM HG: CPT | Performed by: CLINICAL NURSE SPECIALIST

## 2023-05-26 PROCEDURE — 3017F COLORECTAL CA SCREEN DOC REV: CPT | Performed by: CLINICAL NURSE SPECIALIST

## 2023-05-26 PROCEDURE — G8427 DOCREV CUR MEDS BY ELIG CLIN: HCPCS | Performed by: CLINICAL NURSE SPECIALIST

## 2023-05-26 PROCEDURE — 99214 OFFICE O/P EST MOD 30 MIN: CPT | Performed by: CLINICAL NURSE SPECIALIST

## 2023-05-26 PROCEDURE — 3078F DIAST BP <80 MM HG: CPT | Performed by: CLINICAL NURSE SPECIALIST

## 2023-05-26 PROCEDURE — 4004F PT TOBACCO SCREEN RCVD TLK: CPT | Performed by: CLINICAL NURSE SPECIALIST

## 2023-05-26 PROCEDURE — G8420 CALC BMI NORM PARAMETERS: HCPCS | Performed by: CLINICAL NURSE SPECIALIST

## 2023-05-26 RX ORDER — DIGOXIN 125 MCG
125 TABLET ORAL
Qty: 36 TABLET | Refills: 1 | Status: SHIPPED | OUTPATIENT
Start: 2023-05-26

## 2023-05-26 NOTE — PATIENT INSTRUCTIONS
Remember to take your medications like I have prescribed  RTO in sept   Letter given to patient with BP for work

## 2023-05-26 NOTE — PROGRESS NOTES
[facility]  Progress Note    Primary Care Doctor:  Ashleigh Mendoza MD    Chief Complaint   Patient presents with    Follow-up    Congestive Heart Failure        History of Present Illness:  62 y.o. male with history of non compliance with medications, uncontrolled HTN, alcohol and smokes  2/24-27/2020 for acute sHF with LVEF of 15-20%, diuresed; uncontrolled HTN due to running out of insurance and stopping his medications; ABRNA on CKD  entresto 4/14/2021  ICD placed 3/24/2023    I had the pleasure of seeing Eri Clifford in follow up for systolic heart failure and urgent visit for hypertension. He had a physical for a new job and his BP was 160/100 on Monday 5/22/2023. He is here today requesting for form to be filled out for his BP recheck. He admits to not taking his medications over the weekend and drinking some alcohol. He smells of alcohol today. His BP is improved 126/70 by me today. He denies any shortness of breath, edema or chest pain. Past Medical History:   has a past medical history of Acute on chronic systolic (congestive) heart failure (HCC), Acute systolic CHF (congestive heart failure), NYHA class 3 ([address] and Asthma. Surgical History:   has a past surgical history that includes hernia repair. Social History:   reports that he has been smoking cigars. He has never used smokeless tobacco. He reports current alcohol use of about 4.2 standard drinks per week. He reports that he does not use drugs. Family History:   Family History   Problem Relation Age of Onset    High Blood Pressure Mother        Home Medications:  Prior to Admission medications    Medication Sig Start Date End Date Taking?  Authorizing Provider   digoxin (LANOXIN) 125 MCG tablet Take 1 tablet by mouth three times a week 5/26/23  Yes CHICHI Escudero   dapagliflozin (FARXIGA) 10 MG tablet Take 1 tablet by mouth every morning 5/16/23  Yes CHICHI Shell   eplerenone (INSPRA) 25 MG

## 2023-07-13 ENCOUNTER — TELEPHONE (OUTPATIENT)
Dept: CARDIOLOGY CLINIC | Age: 58
End: 2023-07-13

## 2023-07-13 RX ORDER — DIGOXIN 125 MCG
125 TABLET ORAL
Qty: 36 TABLET | Refills: 1 | Status: SHIPPED | OUTPATIENT
Start: 2023-07-14

## 2023-07-13 NOTE — TELEPHONE ENCOUNTER
I called to r/s his appt in Sept.  He requested a refill on his Digoxin. Send to [facility].   Thanks

## 2023-10-30 ENCOUNTER — OFFICE VISIT (OUTPATIENT)
Dept: CARDIOLOGY CLINIC | Age: 58
End: 2023-10-30
Payer: COMMERCIAL

## 2023-10-30 VITALS
WEIGHT: 156 LBS | HEART RATE: 85 BPM | SYSTOLIC BLOOD PRESSURE: 108 MMHG | HEIGHT: 70 IN | DIASTOLIC BLOOD PRESSURE: 66 MMHG | BODY MASS INDEX: 22.33 KG/M2 | OXYGEN SATURATION: 96 %

## 2023-10-30 DIAGNOSIS — F10.10 ALCOHOL ABUSE: ICD-10-CM

## 2023-10-30 DIAGNOSIS — I10 ESSENTIAL HYPERTENSION: ICD-10-CM

## 2023-10-30 DIAGNOSIS — Z95.810 ICD (IMPLANTABLE CARDIOVERTER-DEFIBRILLATOR), SINGLE, IN SITU: ICD-10-CM

## 2023-10-30 DIAGNOSIS — I50.22 CHRONIC SYSTOLIC CHF (CONGESTIVE HEART FAILURE) (HCC): Primary | ICD-10-CM

## 2023-10-30 DIAGNOSIS — E55.9 VITAMIN D DEFICIENCY: ICD-10-CM

## 2023-10-30 PROCEDURE — G8427 DOCREV CUR MEDS BY ELIG CLIN: HCPCS | Performed by: CLINICAL NURSE SPECIALIST

## 2023-10-30 PROCEDURE — 3017F COLORECTAL CA SCREEN DOC REV: CPT | Performed by: CLINICAL NURSE SPECIALIST

## 2023-10-30 PROCEDURE — G8484 FLU IMMUNIZE NO ADMIN: HCPCS | Performed by: CLINICAL NURSE SPECIALIST

## 2023-10-30 PROCEDURE — 3078F DIAST BP <80 MM HG: CPT | Performed by: CLINICAL NURSE SPECIALIST

## 2023-10-30 PROCEDURE — 4004F PT TOBACCO SCREEN RCVD TLK: CPT | Performed by: CLINICAL NURSE SPECIALIST

## 2023-10-30 PROCEDURE — 99214 OFFICE O/P EST MOD 30 MIN: CPT | Performed by: CLINICAL NURSE SPECIALIST

## 2023-10-30 PROCEDURE — G8420 CALC BMI NORM PARAMETERS: HCPCS | Performed by: CLINICAL NURSE SPECIALIST

## 2023-10-30 PROCEDURE — 3074F SYST BP LT 130 MM HG: CPT | Performed by: CLINICAL NURSE SPECIALIST

## 2023-10-30 RX ORDER — DIGOXIN 125 MCG
125 TABLET ORAL
Qty: 36 TABLET | Refills: 1 | Status: SHIPPED | OUTPATIENT
Start: 2023-10-30

## 2023-10-30 NOTE — PROGRESS NOTES
[facility]  Progress Note    Primary Care Doctor:  Kvng Persaud MD    Chief Complaint   Patient presents with    Congestive Heart Failure        History of Present Illness:  62 y.o. male with history of non compliance with medications, uncontrolled HTN, alcohol and smokes  2/24-27/2020 for acute sHF with LVEF of 15-20%, diuresed; uncontrolled HTN due to running out of insurance and stopping his medications; ABRAN on CKD  entresto 4/14/2021  ICD placed 3/24/2023    I had the pleasure of seeing Aneta Clifton in follow up for systolic heart failure. He is ambulatory and by his self. He is so proud that his weight is up from 137->156. He is only taking his medications 3 days a week. He has not set up his remote optival device and therefore, no device check since march. His optival shows increased thoracic impedence since July. No chest pain, palpitations, lightheadedness, edema or shortness of breath. Past Medical History:   has a past medical history of Acute on chronic systolic (congestive) heart failure (HCC), Acute systolic CHF (congestive heart failure), NYHA class 3 ([address]), and Asthma. Surgical History:   has a past surgical history that includes hernia repair. Social History:   reports that he has been smoking cigars. He has never used smokeless tobacco. He reports current alcohol use of about 4.2 standard drinks of alcohol per week. He reports that he does not use drugs. Family History:   Family History   Problem Relation Age of Onset    High Blood Pressure Mother        Home Medications:  Prior to Admission medications    Medication Sig Start Date End Date Taking?  Authorizing Provider   digoxin (LANOXIN) 125 MCG tablet Take 1 tablet by mouth three times a week 7/14/23  Yes Mirna Mancilla APRN - CNS   dapagliflozin (FARXIGA) 10 MG tablet Take 1 tablet by mouth every morning 5/16/23  Yes CHICHI Glynn - CNS   eplerenone (INSPRA) 25 MG tablet Take 1 tablet by mouth daily

## 2023-10-30 NOTE — PATIENT INSTRUCTIONS
You must take your medications like I prescribe  Set up you device monitor and send a remote next Monday  Refilled the digoxin  RTO nov 21st at 915 am

## 2023-10-31 PROCEDURE — G2066 INTER DEVC REMOTE 30D: HCPCS | Performed by: CLINICAL NURSE SPECIALIST

## 2023-10-31 PROCEDURE — 93297 REM INTERROG DEV EVAL ICPMS: CPT | Performed by: CLINICAL NURSE SPECIALIST

## 2023-11-21 ENCOUNTER — HOSPITAL ENCOUNTER (OUTPATIENT)
Age: 58
Discharge: HOME | End: 2023-11-21
Payer: COMMERCIAL

## 2023-11-21 ENCOUNTER — OFFICE VISIT (OUTPATIENT)
Dept: CARDIOLOGY CLINIC | Age: 58
End: 2023-11-21
Payer: COMMERCIAL

## 2023-11-21 VITALS
BODY MASS INDEX: 22.48 KG/M2 | DIASTOLIC BLOOD PRESSURE: 90 MMHG | WEIGHT: 157 LBS | HEART RATE: 70 BPM | SYSTOLIC BLOOD PRESSURE: 134 MMHG | OXYGEN SATURATION: 98 % | HEIGHT: 70 IN

## 2023-11-21 DIAGNOSIS — I10 ESSENTIAL HYPERTENSION: ICD-10-CM

## 2023-11-21 DIAGNOSIS — E55.9 VITAMIN D DEFICIENCY: ICD-10-CM

## 2023-11-21 DIAGNOSIS — F10.10 ALCOHOL ABUSE: ICD-10-CM

## 2023-11-21 DIAGNOSIS — E78.5 HYPERLIPIDEMIA, UNSPECIFIED HYPERLIPIDEMIA TYPE: ICD-10-CM

## 2023-11-21 DIAGNOSIS — I50.22 CHRONIC SYSTOLIC HEART FAILURE (HCC): ICD-10-CM

## 2023-11-21 DIAGNOSIS — I50.22 CHRONIC SYSTOLIC HEART FAILURE (HCC): Primary | ICD-10-CM

## 2023-11-21 DIAGNOSIS — Z95.810 ICD (IMPLANTABLE CARDIOVERTER-DEFIBRILLATOR), SINGLE, IN SITU: ICD-10-CM

## 2023-11-21 LAB
ALBUMIN SERPL-MCNC: 4.2 G/DL (ref 3.4–5)
ALBUMIN/GLOB SERPL: 1.4 {RATIO} (ref 1.1–2.2)
ALP SERPL-CCNC: 75 U/L (ref 40–129)
ALT SERPL-CCNC: 24 U/L (ref 10–40)
ANION GAP SERPL CALCULATED.3IONS-SCNC: 8 MMOL/L (ref 3–16)
AST SERPL-CCNC: 29 U/L (ref 15–37)
BILIRUB SERPL-MCNC: 0.6 MG/DL (ref 0–1)
BUN SERPL-MCNC: 15 MG/DL (ref 7–20)
CALCIUM SERPL-MCNC: 9.3 MG/DL (ref 8.3–10.6)
CHLORIDE SERPL-SCNC: 100 MMOL/L (ref 99–110)
CHOLEST SERPL-MCNC: 110 MG/DL (ref 0–199)
CO2 SERPL-SCNC: 27 MMOL/L (ref 21–32)
CREAT SERPL-MCNC: 1.1 MG/DL (ref 0.9–1.3)
GFR SERPLBLD CREATININE-BSD FMLA CKD-EPI: >60 ML/MIN/{1.73_M2}
GLUCOSE SERPL-MCNC: 124 MG/DL (ref 70–99)
HDLC SERPL-MCNC: 72 MG/DL (ref 40–60)
LDLC SERPL CALC-MCNC: 27 MG/DL
NT-PROBNP SERPL-MCNC: 627 PG/ML (ref 0–124)
POTASSIUM SERPL-SCNC: 4.2 MMOL/L (ref 3.5–5.1)
PROT SERPL-MCNC: 7.3 G/DL (ref 6.4–8.2)
SODIUM SERPL-SCNC: 135 MMOL/L (ref 136–145)
TRIGL SERPL-MCNC: 55 MG/DL (ref 0–150)
VLDLC SERPL CALC-MCNC: 11 MG/DL

## 2023-11-21 PROCEDURE — 99214 OFFICE O/P EST MOD 30 MIN: CPT | Performed by: CLINICAL NURSE SPECIALIST

## 2023-11-21 PROCEDURE — 3075F SYST BP GE 130 - 139MM HG: CPT | Performed by: CLINICAL NURSE SPECIALIST

## 2023-11-21 PROCEDURE — G8484 FLU IMMUNIZE NO ADMIN: HCPCS | Performed by: CLINICAL NURSE SPECIALIST

## 2023-11-21 PROCEDURE — 4004F PT TOBACCO SCREEN RCVD TLK: CPT | Performed by: CLINICAL NURSE SPECIALIST

## 2023-11-21 PROCEDURE — G8427 DOCREV CUR MEDS BY ELIG CLIN: HCPCS | Performed by: CLINICAL NURSE SPECIALIST

## 2023-11-21 PROCEDURE — 93296 REM INTERROG EVL PM/IDS: CPT | Performed by: INTERNAL MEDICINE

## 2023-11-21 PROCEDURE — G8420 CALC BMI NORM PARAMETERS: HCPCS | Performed by: CLINICAL NURSE SPECIALIST

## 2023-11-21 PROCEDURE — 36415 COLL VENOUS BLD VENIPUNCTURE: CPT

## 2023-11-21 PROCEDURE — 93295 DEV INTERROG REMOTE 1/2/MLT: CPT | Performed by: INTERNAL MEDICINE

## 2023-11-21 PROCEDURE — 83880 ASSAY OF NATRIURETIC PEPTIDE: CPT

## 2023-11-21 PROCEDURE — 3080F DIAST BP >= 90 MM HG: CPT | Performed by: CLINICAL NURSE SPECIALIST

## 2023-11-21 PROCEDURE — 80053 COMPREHEN METABOLIC PANEL: CPT

## 2023-11-21 PROCEDURE — 3017F COLORECTAL CA SCREEN DOC REV: CPT | Performed by: CLINICAL NURSE SPECIALIST

## 2023-11-21 PROCEDURE — 80061 LIPID PANEL: CPT

## 2023-11-21 RX ORDER — MELATONIN
1000 DAILY
Qty: 90 TABLET | Refills: 1 | Status: SHIPPED | OUTPATIENT
Start: 2023-11-21

## 2023-11-21 RX ORDER — ATORVASTATIN CALCIUM 40 MG/1
40 TABLET, FILM COATED ORAL DAILY
Qty: 90 TABLET | Refills: 1 | Status: SHIPPED | OUTPATIENT
Start: 2023-11-21

## 2023-11-21 RX ORDER — FUROSEMIDE 20 MG/1
TABLET ORAL
Qty: 90 TABLET | Refills: 0 | Status: SHIPPED | OUTPATIENT
Start: 2023-11-21

## 2023-11-21 RX ORDER — EPLERENONE 25 MG/1
25 TABLET, FILM COATED ORAL DAILY
Qty: 90 TABLET | Refills: 0 | Status: SHIPPED | OUTPATIENT
Start: 2023-11-21

## 2023-11-21 NOTE — PROGRESS NOTES
(COREG) 25 MG tablet Take 1 tablet by mouth 2 times daily 3/23/23  Yes John Bolivarbrien LEBLANC APRN - CNS   atorvastatin (LIPITOR) 40 MG tablet Take 1 tablet by mouth daily 3/23/23  Yes Angie Flynn MD   furosemide (LASIX) 20 MG tablet Take 2 tablets by mouth daily And an extra 20 mg with increased sob 3/23/23  Yes Charo Mirna JUDY LEBLANCN - CNS   vitamin D3 (CHOLECALCIFEROL) 25 MCG (1000 UT) TABS tablet Take 1 tablet by mouth daily 2/13/23  Yes John Bolivarbrien LEBLANC APRN - CNS   sacubitril-valsartan (ENTRESTO)  MG per tablet Take 1 tablet by mouth 2 times daily 6/27/22  Yes Evaristo Ramos MD        Allergies:  Patient has no known allergies. Review of Systems:   Constitutional: there has been no unanticipated weight loss. There's been no change in energy level, sleep pattern, or activity level. Eyes: No visual changes or diplopia. No scleral icterus. ENT: No Headaches, hearing loss or vertigo. No mouth sores or sore throat. Cardiovascular: Reviewed in HPI  Respiratory: No cough or wheezing, no sputum production. No hematemesis. Gastrointestinal: No abdominal pain, appetite loss, blood in stools. No change in bowel or bladder habits. Genitourinary: No dysuria, trouble voiding, or hematuria. Musculoskeletal:  No gait disturbance, weakness or joint complaints. Integumentary: No rash or pruritis. Neurological: No headache, diplopia, change in muscle strength, numbness or tingling. No change in gait, balance, coordination, mood, affect, memory, mentation, behavior. Psychiatric: No anxiety, no depression. Endocrine: No malaise, fatigue or temperature intolerance. No excessive thirst, fluid intake, or urination. No tremor. Hematologic/Lymphatic: No abnormal bruising or bleeding, blood clots or swollen lymph nodes. Allergic/Immunologic: No nasal congestion or hives.     Physical Examination:    Vitals:    11/21/23 0923 11/21/23 0928   BP: (!) 134/90 (!) 134/90   Site: Right Upper Arm    Position:

## 2023-12-05 PROCEDURE — G2066 INTER DEVC REMOTE 30D: HCPCS | Performed by: CLINICAL NURSE SPECIALIST

## 2023-12-05 PROCEDURE — 93297 REM INTERROG DEV EVAL ICPMS: CPT | Performed by: CLINICAL NURSE SPECIALIST

## 2024-02-21 ENCOUNTER — HOSPITAL ENCOUNTER (OUTPATIENT)
Age: 59
Discharge: HOME | End: 2024-02-21
Payer: COMMERCIAL

## 2024-02-21 ENCOUNTER — OFFICE VISIT (OUTPATIENT)
Dept: CARDIOLOGY CLINIC | Age: 59
End: 2024-02-21
Payer: COMMERCIAL

## 2024-02-21 VITALS
SYSTOLIC BLOOD PRESSURE: 110 MMHG | DIASTOLIC BLOOD PRESSURE: 80 MMHG | HEART RATE: 76 BPM | BODY MASS INDEX: 22.9 KG/M2 | HEIGHT: 70 IN | WEIGHT: 160 LBS | OXYGEN SATURATION: 95 %

## 2024-02-21 DIAGNOSIS — Z95.810 ICD (IMPLANTABLE CARDIOVERTER-DEFIBRILLATOR), SINGLE, IN SITU: ICD-10-CM

## 2024-02-21 DIAGNOSIS — I50.22 CHRONIC SYSTOLIC HEART FAILURE (HCC): ICD-10-CM

## 2024-02-21 DIAGNOSIS — I10 ESSENTIAL HYPERTENSION: ICD-10-CM

## 2024-02-21 DIAGNOSIS — I50.22 CHRONIC SYSTOLIC HEART FAILURE (HCC): Primary | ICD-10-CM

## 2024-02-21 DIAGNOSIS — F10.10 ALCOHOL ABUSE: ICD-10-CM

## 2024-02-21 DIAGNOSIS — E55.9 VITAMIN D DEFICIENCY: ICD-10-CM

## 2024-02-21 LAB
ANION GAP SERPL CALCULATED.3IONS-SCNC: 14 MMOL/L (ref 3–16)
BUN SERPL-MCNC: 14 MG/DL (ref 7–20)
CALCIUM SERPL-MCNC: 9.1 MG/DL (ref 8.3–10.6)
CHLORIDE SERPL-SCNC: 102 MMOL/L (ref 99–110)
CO2 SERPL-SCNC: 25 MMOL/L (ref 21–32)
CREAT SERPL-MCNC: 1.6 MG/DL (ref 0.9–1.3)
GFR SERPLBLD CREATININE-BSD FMLA CKD-EPI: 50 ML/MIN/{1.73_M2}
GLUCOSE SERPL-MCNC: 103 MG/DL (ref 70–99)
NT-PROBNP SERPL-MCNC: 772 PG/ML (ref 0–124)
POTASSIUM SERPL-SCNC: 4.3 MMOL/L (ref 3.5–5.1)
SODIUM SERPL-SCNC: 141 MMOL/L (ref 136–145)
TSH SERPL DL<=0.005 MIU/L-ACNC: 1.27 UIU/ML (ref 0.27–4.2)

## 2024-02-21 PROCEDURE — 3079F DIAST BP 80-89 MM HG: CPT | Performed by: CLINICAL NURSE SPECIALIST

## 2024-02-21 PROCEDURE — 3074F SYST BP LT 130 MM HG: CPT | Performed by: CLINICAL NURSE SPECIALIST

## 2024-02-21 PROCEDURE — G8427 DOCREV CUR MEDS BY ELIG CLIN: HCPCS | Performed by: CLINICAL NURSE SPECIALIST

## 2024-02-21 PROCEDURE — G8420 CALC BMI NORM PARAMETERS: HCPCS | Performed by: CLINICAL NURSE SPECIALIST

## 2024-02-21 PROCEDURE — 83880 ASSAY OF NATRIURETIC PEPTIDE: CPT

## 2024-02-21 PROCEDURE — 99214 OFFICE O/P EST MOD 30 MIN: CPT | Performed by: CLINICAL NURSE SPECIALIST

## 2024-02-21 PROCEDURE — 3017F COLORECTAL CA SCREEN DOC REV: CPT | Performed by: CLINICAL NURSE SPECIALIST

## 2024-02-21 PROCEDURE — 4004F PT TOBACCO SCREEN RCVD TLK: CPT | Performed by: CLINICAL NURSE SPECIALIST

## 2024-02-21 PROCEDURE — 80048 BASIC METABOLIC PNL TOTAL CA: CPT

## 2024-02-21 PROCEDURE — 84443 ASSAY THYROID STIM HORMONE: CPT

## 2024-02-21 PROCEDURE — G8484 FLU IMMUNIZE NO ADMIN: HCPCS | Performed by: CLINICAL NURSE SPECIALIST

## 2024-02-21 PROCEDURE — 36415 COLL VENOUS BLD VENIPUNCTURE: CPT

## 2024-02-21 NOTE — PATIENT INSTRUCTIONS
Take an extra 20 mg for the next 4 days  Dont miss your medications  SET UP YOUR DEVICE MONITOR  Continue all current medication  Blood work today  RTO in

## 2024-02-21 NOTE — PROGRESS NOTES
[facility]  Progress Note    Primary Care Doctor:  Joe Madrid MD    Chief Complaint   Patient presents with    3 Month Follow-Up    Congestive Heart Failure        History of Present Illness:  58 y.o. male with history of non compliance with medications, uncontrolled HTN, alcohol and smokes  2/24-27/2020 for acute sHF with LVEF of 15-20%, diuresed; uncontrolled HTN due to running out of insurance and stopping his medications; ABRAN on CKD  entresto 4/14/2021  ICD placed 3/24/2023    I had the pleasure of seeing Adithya Watts in follow up for systolic heart failure.  He is ambulatory and by his self.  He has not set up his optival remote device at home.  His optival today shows continual increased impedence.  He admits to missing some of his evening meds.  He denies any increased shortness of breath, palpitations, lightheadedness or edema.  He admits to maybe over doing the fluids.  He has all his medications.  He does continue to drink some beer    Past Medical History:   has a past medical history of Acute on chronic systolic (congestive) heart failure (HCC), Acute systolic CHF (congestive heart failure), NYHA class 3 (HCC), and Asthma.  Surgical History:   has a past surgical history that includes hernia repair.   Social History:   reports that he has been smoking cigars. He has never used smokeless tobacco. He reports current alcohol use of about 4.2 standard drinks of alcohol per week. He reports that he does not use drugs.   Family History:   Family History   Problem Relation Age of Onset    High Blood Pressure Mother        Home Medications:  Prior to Admission medications    Medication Sig Start Date End Date Taking? Authorizing Provider   atorvastatin (LIPITOR) 40 MG tablet Take 1 tablet by mouth daily 11/21/23  Yes Mirna Mancilla APRN - CNS   dapagliflozin (FARXIGA) 10 MG tablet Take 1 tablet by mouth every morning 11/21/23  Yes Mirna Mancilla APRN - CNS   furosemide (LASIX) 20 MG

## 2024-02-22 ENCOUNTER — TELEPHONE (OUTPATIENT)
Dept: CARDIOLOGY CLINIC | Age: 59
End: 2024-02-22

## 2024-02-22 DIAGNOSIS — I50.22 CHRONIC SYSTOLIC HEART FAILURE (HCC): Primary | ICD-10-CM

## 2024-06-21 ENCOUNTER — OFFICE VISIT (OUTPATIENT)
Dept: CARDIOLOGY CLINIC | Age: 59
End: 2024-06-21
Payer: COMMERCIAL

## 2024-06-21 VITALS
OXYGEN SATURATION: 96 % | SYSTOLIC BLOOD PRESSURE: 120 MMHG | DIASTOLIC BLOOD PRESSURE: 80 MMHG | BODY MASS INDEX: 23.19 KG/M2 | HEART RATE: 79 BPM | WEIGHT: 162 LBS | HEIGHT: 70 IN

## 2024-06-21 DIAGNOSIS — Z95.810 ICD (IMPLANTABLE CARDIOVERTER-DEFIBRILLATOR), SINGLE, IN SITU: ICD-10-CM

## 2024-06-21 DIAGNOSIS — I50.22 CHRONIC SYSTOLIC HEART FAILURE (HCC): Primary | ICD-10-CM

## 2024-06-21 DIAGNOSIS — F10.10 ALCOHOL ABUSE: ICD-10-CM

## 2024-06-21 DIAGNOSIS — E55.9 VITAMIN D DEFICIENCY: ICD-10-CM

## 2024-06-21 DIAGNOSIS — I10 ESSENTIAL HYPERTENSION: ICD-10-CM

## 2024-06-21 PROCEDURE — 4004F PT TOBACCO SCREEN RCVD TLK: CPT | Performed by: CLINICAL NURSE SPECIALIST

## 2024-06-21 PROCEDURE — 3017F COLORECTAL CA SCREEN DOC REV: CPT | Performed by: CLINICAL NURSE SPECIALIST

## 2024-06-21 PROCEDURE — 3074F SYST BP LT 130 MM HG: CPT | Performed by: CLINICAL NURSE SPECIALIST

## 2024-06-21 PROCEDURE — 99214 OFFICE O/P EST MOD 30 MIN: CPT | Performed by: CLINICAL NURSE SPECIALIST

## 2024-06-21 PROCEDURE — 3079F DIAST BP 80-89 MM HG: CPT | Performed by: CLINICAL NURSE SPECIALIST

## 2024-06-21 PROCEDURE — G8427 DOCREV CUR MEDS BY ELIG CLIN: HCPCS | Performed by: CLINICAL NURSE SPECIALIST

## 2024-06-21 PROCEDURE — G8420 CALC BMI NORM PARAMETERS: HCPCS | Performed by: CLINICAL NURSE SPECIALIST

## 2024-06-21 RX ORDER — EPLERENONE 25 MG/1
25 TABLET, FILM COATED ORAL DAILY
Qty: 90 TABLET | Refills: 0 | Status: SHIPPED | OUTPATIENT
Start: 2024-06-21

## 2024-06-21 RX ORDER — DIGOXIN 125 MCG
125 TABLET ORAL
Qty: 36 TABLET | Refills: 1 | Status: SHIPPED | OUTPATIENT
Start: 2024-06-21

## 2024-06-21 RX ORDER — DAPAGLIFLOZIN 10 MG/1
10 TABLET, FILM COATED ORAL EVERY MORNING
Qty: 90 TABLET | Refills: 0 | Status: SHIPPED | OUTPATIENT
Start: 2024-06-21

## 2024-06-21 NOTE — PATIENT INSTRUCTIONS
Set up medtronic remote device so I dont have to worry about you  Blood work in 2 weeks  Refills done  Continue all current medications  RTO in 4 months

## 2024-06-21 NOTE — PROGRESS NOTES
systolic   function is mild-moderately reduced.    2/27/2020 Cardiac Cath : Dr Lincoln  Anatomy:   LM-Normal   LAD-Normal  Cx-Normal   OM- Normal   RCA-Dominant  RPDA- Normal      LVEDP- 10     Impression  ~Coronary Angiography w/ Normal Coronaries  ~Normal LVEDP  ~Non ischemic Cardiomyopathy    Echo 2/25/2020  Summary   Left ventricular cavity size is mild to moderately dilated.   There is mild concentric left ventricular hypertrophy.   Overall, left ventricular systolic function is severely depressed.   Ejection fraction is visually estimated to be 15-20%. (Jimenez's=19%)   Severe global hypokinesis.   Grade II diastolic dysfunction with elevated LV filling pressures.   Mild to moderate mitral regurgitation.   The left atrium is mildly dilated.  Mild to moderate tricuspid regurgitation with PASP of 25 mmHg    Assessment:    1. Chronic systolic heart failure (HCC) on arni, BB, aldosterone antagonist, digoxin and farxiga with some improvement in LVEF   2. Essential hypertension    3. Vitamin d deficiency   4. alcohol abuse recommended abstinence    5.  ICD in place      Plan:   Set up medtronic remote device so I dont have to worry about you  Blood work in 2 weeks  Refills done  Continue all current medications  RTO in 4 months    NYHA II    I appreciate the opportunity of cooperating in the care of this individual.    CHICHI OLVERA - CNS, CNS, 6/21/2024, 2:17 PM

## 2024-07-08 ENCOUNTER — TELEPHONE (OUTPATIENT)
Dept: CARDIOLOGY CLINIC | Age: 59
End: 2024-07-08

## 2024-07-08 NOTE — TELEPHONE ENCOUNTER
Pt would like to discuss some of his issues with his cardio. That is all he would give me. Please call pt to discuss.

## 2024-07-09 NOTE — TELEPHONE ENCOUNTER
Spoke with patient trying to get more information but he would like to wait until next week to speak to NPRG.

## 2024-07-22 ENCOUNTER — TELEPHONE (OUTPATIENT)
Dept: CARDIOLOGY CLINIC | Age: 59
End: 2024-07-22

## 2024-07-22 NOTE — TELEPHONE ENCOUNTER
Pt called back, he stated he is not working any more and he does not know where to go to file for disability.      He requested NPRG try to call him again.

## 2024-07-22 NOTE — TELEPHONE ENCOUNTER
I LM him a message last week  He needs to talk to his employer and file for disability  All I can do is provide my notes and echo reports

## 2024-07-22 NOTE — TELEPHONE ENCOUNTER
Pt would like call from [facility].  He is trying to get his disability going and he needs to know what to do.    Please advise

## 2024-07-22 NOTE — TELEPHONE ENCOUNTER
Spoke to patient gave him the social security [phone]  He would like [facility] to call him asap

## 2024-07-26 NOTE — TELEPHONE ENCOUNTER
I tried calling him twice  He needs to call disability and get the ball rolling as then we send paperwork  He can make an appt with me but that would not even change anything

## 2024-07-29 ENCOUNTER — OFFICE VISIT (OUTPATIENT)
Dept: CARDIOLOGY CLINIC | Age: 59
End: 2024-07-29
Payer: COMMERCIAL

## 2024-07-29 VITALS
WEIGHT: 158 LBS | HEIGHT: 70 IN | BODY MASS INDEX: 22.62 KG/M2 | SYSTOLIC BLOOD PRESSURE: 124 MMHG | OXYGEN SATURATION: 99 % | HEART RATE: 70 BPM | DIASTOLIC BLOOD PRESSURE: 80 MMHG

## 2024-07-29 DIAGNOSIS — I10 ESSENTIAL HYPERTENSION: ICD-10-CM

## 2024-07-29 DIAGNOSIS — E55.9 VITAMIN D DEFICIENCY: ICD-10-CM

## 2024-07-29 DIAGNOSIS — Z95.810 ICD (IMPLANTABLE CARDIOVERTER-DEFIBRILLATOR), SINGLE, IN SITU: ICD-10-CM

## 2024-07-29 DIAGNOSIS — F10.10 ALCOHOL ABUSE: ICD-10-CM

## 2024-07-29 DIAGNOSIS — I50.22 CHRONIC SYSTOLIC HEART FAILURE (HCC): Primary | ICD-10-CM

## 2024-07-29 PROCEDURE — 3017F COLORECTAL CA SCREEN DOC REV: CPT | Performed by: CLINICAL NURSE SPECIALIST

## 2024-07-29 PROCEDURE — 4004F PT TOBACCO SCREEN RCVD TLK: CPT | Performed by: CLINICAL NURSE SPECIALIST

## 2024-07-29 PROCEDURE — G8427 DOCREV CUR MEDS BY ELIG CLIN: HCPCS | Performed by: CLINICAL NURSE SPECIALIST

## 2024-07-29 PROCEDURE — 99214 OFFICE O/P EST MOD 30 MIN: CPT | Performed by: CLINICAL NURSE SPECIALIST

## 2024-07-29 PROCEDURE — G8420 CALC BMI NORM PARAMETERS: HCPCS | Performed by: CLINICAL NURSE SPECIALIST

## 2024-07-29 PROCEDURE — 3079F DIAST BP 80-89 MM HG: CPT | Performed by: CLINICAL NURSE SPECIALIST

## 2024-07-29 PROCEDURE — 3074F SYST BP LT 130 MM HG: CPT | Performed by: CLINICAL NURSE SPECIALIST

## 2024-07-29 NOTE — PATIENT INSTRUCTIONS
Call [employer] [phone]   Make an appt with Dr Madrid   Letter for work restrictions  Continue all current medications  Keep October appt  Set up the remote device

## 2024-07-29 NOTE — PROGRESS NOTES
18%, Heart model EF: 19%).   -Global longitudinal strain: -5% (abnormal).   -The right ventricle is moderately enlarged. Right ventricular systolic   function is mild-moderately reduced.    2/27/2020 Cardiac Cath : Dr Lincoln  Anatomy:   LM-Normal   LAD-Normal  Cx-Normal   OM- Normal   RCA-Dominant  RPDA- Normal      LVEDP- 10     Impression  ~Coronary Angiography w/ Normal Coronaries  ~Normal LVEDP  ~Non ischemic Cardiomyopathy    Echo 2/25/2020  Summary   Left ventricular cavity size is mild to moderately dilated.   There is mild concentric left ventricular hypertrophy.   Overall, left ventricular systolic function is severely depressed.   Ejection fraction is visually estimated to be 15-20%. (Jimenez's=19%)   Severe global hypokinesis.   Grade II diastolic dysfunction with elevated LV filling pressures.   Mild to moderate mitral regurgitation.   The left atrium is mildly dilated.  Mild to moderate tricuspid regurgitation with PASP of 25 mmHg    Assessment:    1. Chronic systolic heart failure (HCC) on arni, BB, aldosterone antagonist, digoxin and farxiga with some improvement in LVEF   2. Essential hypertension    3. Vitamin d deficiency   4. alcohol abuse recommended abstinence    5.  ICD in place      Plan:   Call social security [phone]   Make an appt with Dr Madrid   Letter for work restrictions  Continue all current medications  Keep October appt  Set up the remote device      NYHA II    I appreciate the opportunity of cooperating in the care of this individual.    CHICHI OLVERA - CNS, CNS, 7/29/2024, 11:58 AM

## 2024-08-01 ENCOUNTER — TELEPHONE (OUTPATIENT)
Dept: CARDIOLOGY CLINIC | Age: 59
End: 2024-08-01

## 2024-09-10 RX ORDER — CARVEDILOL 25 MG/1
25 TABLET ORAL 2 TIMES DAILY
Qty: 180 TABLET | Refills: 0 | Status: SHIPPED | OUTPATIENT
Start: 2024-09-10

## 2024-09-10 RX ORDER — FUROSEMIDE 20 MG
TABLET ORAL
Qty: 90 TABLET | Refills: 0 | Status: SHIPPED | OUTPATIENT
Start: 2024-09-10

## 2024-09-10 RX ORDER — DIGOXIN 125 MCG
125 TABLET ORAL
Qty: 36 TABLET | Refills: 0 | Status: SHIPPED | OUTPATIENT
Start: 2024-09-11

## 2024-11-01 DIAGNOSIS — I10 ESSENTIAL HYPERTENSION: ICD-10-CM

## 2024-11-01 DIAGNOSIS — I50.22 CHRONIC SYSTOLIC HEART FAILURE (HCC): ICD-10-CM

## 2024-11-01 DIAGNOSIS — E78.5 HYPERLIPIDEMIA, UNSPECIFIED HYPERLIPIDEMIA TYPE: ICD-10-CM

## 2024-11-01 RX ORDER — ATORVASTATIN CALCIUM 40 MG/1
40 TABLET, FILM COATED ORAL DAILY
Qty: 90 TABLET | Refills: 1 | Status: SHIPPED | OUTPATIENT
Start: 2024-11-01

## 2024-11-01 RX ORDER — DIGOXIN 125 MCG
125 TABLET ORAL
Qty: 36 TABLET | Refills: 0 | Status: SHIPPED | OUTPATIENT
Start: 2024-11-01

## 2024-11-01 NOTE — TELEPHONE ENCOUNTER
Medication Refill    Medication needing refilled:  sacubitril-valsartan (ENTRESTO)  MG   atorvastatin (LIPITOR) 40 MG   digoxin (LANOXIN) 125 MCG    Dosage of the medication:    How are you taking this medication (QD, BID, TID, QID, PRN):    30 or 90 day supply called in: 90 day supply    When will you run out of your medication:    Which Pharmacy are we sending the medication to?: [facility] - [place], [place] - [address] [place] [address] Rd - P [phone] - F [phone]     Pt states he has been out for a couple days asking if scripts could be sent today and to call him to advise

## 2024-11-04 ENCOUNTER — OFFICE VISIT (OUTPATIENT)
Dept: CARDIOLOGY CLINIC | Age: 59
End: 2024-11-04
Payer: COMMERCIAL

## 2024-11-04 VITALS
BODY MASS INDEX: 22.62 KG/M2 | HEART RATE: 94 BPM | SYSTOLIC BLOOD PRESSURE: 118 MMHG | DIASTOLIC BLOOD PRESSURE: 80 MMHG | HEIGHT: 70 IN | WEIGHT: 158 LBS | OXYGEN SATURATION: 97 %

## 2024-11-04 DIAGNOSIS — F10.10 ALCOHOL ABUSE: ICD-10-CM

## 2024-11-04 DIAGNOSIS — I50.22 CHRONIC SYSTOLIC HEART FAILURE (HCC): Primary | ICD-10-CM

## 2024-11-04 DIAGNOSIS — E55.9 VITAMIN D DEFICIENCY: ICD-10-CM

## 2024-11-04 DIAGNOSIS — Z95.810 ICD (IMPLANTABLE CARDIOVERTER-DEFIBRILLATOR), SINGLE, IN SITU: ICD-10-CM

## 2024-11-04 DIAGNOSIS — I10 ESSENTIAL HYPERTENSION: ICD-10-CM

## 2024-11-04 DIAGNOSIS — E78.5 HYPERLIPIDEMIA, UNSPECIFIED HYPERLIPIDEMIA TYPE: ICD-10-CM

## 2024-11-04 PROCEDURE — 4004F PT TOBACCO SCREEN RCVD TLK: CPT | Performed by: CLINICAL NURSE SPECIALIST

## 2024-11-04 PROCEDURE — G8420 CALC BMI NORM PARAMETERS: HCPCS | Performed by: CLINICAL NURSE SPECIALIST

## 2024-11-04 PROCEDURE — 3079F DIAST BP 80-89 MM HG: CPT | Performed by: CLINICAL NURSE SPECIALIST

## 2024-11-04 PROCEDURE — 3074F SYST BP LT 130 MM HG: CPT | Performed by: CLINICAL NURSE SPECIALIST

## 2024-11-04 PROCEDURE — 99214 OFFICE O/P EST MOD 30 MIN: CPT | Performed by: CLINICAL NURSE SPECIALIST

## 2024-11-04 PROCEDURE — 3017F COLORECTAL CA SCREEN DOC REV: CPT | Performed by: CLINICAL NURSE SPECIALIST

## 2024-11-04 PROCEDURE — G8427 DOCREV CUR MEDS BY ELIG CLIN: HCPCS | Performed by: CLINICAL NURSE SPECIALIST

## 2024-11-04 PROCEDURE — G8484 FLU IMMUNIZE NO ADMIN: HCPCS | Performed by: CLINICAL NURSE SPECIALIST

## 2024-11-04 NOTE — PROGRESS NOTES
[facility]  Progress Note    Primary Care Doctor:  Joe Madrid MD    Chief Complaint   Patient presents with    Follow-up    Congestive Heart Failure        History of Present Illness:  59 y.o. male with history of non compliance with medications, uncontrolled HTN, alcohol and smokes  2/24-27/2020 for acute sHF with LVEF of 15-20%, diuresed; uncontrolled HTN due to running out of insurance and stopping his medications; ABRAN on CKD  entresto 4/14/2021  ICD placed 3/24/2023    I had the pleasure of seeing Adithya Watts in follow up for systolic heart failure.  He is ambulatory and by his self.  He still has not set up his remote ICD device.  Optival today shows normal thoracic impedence and some NSVT on 10/21/2024.  He does admit to drinking some alcohol that weekend as it was his [birth year] on 10/24/2024.  No chest pain, palpitations, lightheadedness, edema or shortness of breath.  He is taking all his medications.  He has not had blood work as instructed.        Past Medical History:   has a past medical history of Acute on chronic systolic (congestive) heart failure (HCC), Acute systolic CHF (congestive heart failure), NYHA class 3 (HCC), and Asthma.  Surgical History:   has a past surgical history that includes hernia repair.   Social History:   reports that he has been smoking cigars. He has never used smokeless tobacco. He reports current alcohol use of about 4.2 standard drinks of alcohol per week. He reports that he does not use drugs.   Family History:   Family History   Problem Relation Age of Onset    High Blood Pressure Mother        Home Medications:  Prior to Admission medications    Medication Sig Start Date End Date Taking? Authorizing Provider   sacubitril-valsartan (ENTRESTO)  MG per tablet Take 1 tablet by mouth 2 times daily 11/1/24  Yes Rachel Mc, CHICHI - CNP   atorvastatin (LIPITOR) 40 MG tablet Take 1 tablet by mouth daily 11/1/24  Yes Rachel Mc APRN - JUANITO   digoxin

## 2024-11-04 NOTE — PATIENT INSTRUCTIONS
Get your remote device set up   Blood work sat and make sure you fast (12 hours)  Continue all current medications  RTO in 6 months

## 2024-11-09 ENCOUNTER — HOSPITAL ENCOUNTER (OUTPATIENT)
Age: 59
Discharge: HOME | End: 2024-11-09
Payer: COMMERCIAL

## 2024-11-09 DIAGNOSIS — E78.5 HYPERLIPIDEMIA, UNSPECIFIED HYPERLIPIDEMIA TYPE: ICD-10-CM

## 2024-11-09 DIAGNOSIS — I50.22 CHRONIC SYSTOLIC HEART FAILURE (HCC): ICD-10-CM

## 2024-11-09 LAB
ALBUMIN SERPL-MCNC: 4.1 G/DL (ref 3.4–5)
ALBUMIN/GLOB SERPL: 1.6 {RATIO} (ref 1.1–2.2)
ALP SERPL-CCNC: 66 U/L (ref 40–129)
ALT SERPL-CCNC: 19 U/L (ref 10–40)
ANION GAP SERPL CALCULATED.3IONS-SCNC: 10 MMOL/L (ref 3–16)
AST SERPL-CCNC: 23 U/L (ref 15–37)
BILIRUB SERPL-MCNC: 0.6 MG/DL (ref 0–1)
BUN SERPL-MCNC: 15 MG/DL (ref 7–20)
CALCIUM SERPL-MCNC: 9.2 MG/DL (ref 8.3–10.6)
CHLORIDE SERPL-SCNC: 106 MMOL/L (ref 99–110)
CHOLEST SERPL-MCNC: 117 MG/DL (ref 0–199)
CO2 SERPL-SCNC: 25 MMOL/L (ref 21–32)
CREAT SERPL-MCNC: 1.1 MG/DL (ref 0.9–1.3)
DEPRECATED RDW RBC AUTO: 15.7 % (ref 12.4–15.4)
GFR SERPLBLD CREATININE-BSD FMLA CKD-EPI: 77 ML/MIN/{1.73_M2}
GLUCOSE SERPL-MCNC: 85 MG/DL (ref 70–99)
HCT VFR BLD AUTO: 45.7 % (ref 40.5–52.5)
HDLC SERPL-MCNC: 52 MG/DL (ref 40–60)
HGB BLD-MCNC: 15.4 G/DL (ref 13.5–17.5)
LDLC SERPL CALC-MCNC: 52 MG/DL
MCH RBC QN AUTO: 32.7 PG (ref 26–34)
MCHC RBC AUTO-ENTMCNC: 33.7 G/DL (ref 31–36)
MCV RBC AUTO: 97.1 FL (ref 80–100)
NT-PROBNP SERPL-MCNC: 629 PG/ML (ref 0–124)
PLATELET # BLD AUTO: 120 K/UL (ref 135–450)
PLATELET BLD QL SMEAR: ABNORMAL
PMV BLD AUTO: 11.1 FL (ref 5–10.5)
POTASSIUM SERPL-SCNC: 4.6 MMOL/L (ref 3.5–5.1)
PROT SERPL-MCNC: 6.6 G/DL (ref 6.4–8.2)
RBC # BLD AUTO: 4.7 M/UL (ref 4.2–5.9)
SLIDE REVIEW: ABNORMAL
SODIUM SERPL-SCNC: 141 MMOL/L (ref 136–145)
TRIGL SERPL-MCNC: 67 MG/DL (ref 0–150)
VLDLC SERPL CALC-MCNC: 13 MG/DL
WBC # BLD AUTO: 6 K/UL (ref 4–11)

## 2024-11-09 PROCEDURE — 80061 LIPID PANEL: CPT

## 2024-11-09 PROCEDURE — 83880 ASSAY OF NATRIURETIC PEPTIDE: CPT

## 2024-11-09 PROCEDURE — 80053 COMPREHEN METABOLIC PANEL: CPT

## 2024-11-09 PROCEDURE — 85027 COMPLETE CBC AUTOMATED: CPT

## 2024-11-09 PROCEDURE — 36415 COLL VENOUS BLD VENIPUNCTURE: CPT

## 2024-11-12 ENCOUNTER — TELEPHONE (OUTPATIENT)
Dept: CARDIOLOGY CLINIC | Age: 59
End: 2024-11-12

## 2024-11-12 NOTE — TELEPHONE ENCOUNTER
Attempted to call pt at both cell and home # with no answer. LMOM for cell # for him to call office back to relay message from NPRG below and ask a couple questions below:    *Blood work  is good no changes in meds    Did you set up your remote device?  2.   Did you schedule with EP?

## 2024-11-13 ENCOUNTER — TELEPHONE (OUTPATIENT)
Dept: CARDIOLOGY CLINIC | Age: 59
End: 2024-11-13

## 2024-11-13 NOTE — TELEPHONE ENCOUNTER
----- Message from CHICHI Best sent at 11/11/2024  8:44 AM EST -----  Blood work  is good no changes in meds  He needs to get scheduled with EP and ask if he has set up his remote device  Thanks    Spoke to patient he will call the office to set up his EP appointments and he is in the process of setting up his remote device. He verbalized understanding of lab results.

## 2024-11-16 ENCOUNTER — HOSPITAL ENCOUNTER (EMERGENCY)
Age: 59
Discharge: LEFT BEFORE DISCHARGE | End: 2024-11-16
Attending: STUDENT IN AN ORGANIZED HEALTH CARE EDUCATION/TRAINING PROGRAM
Payer: COMMERCIAL

## 2024-11-16 ENCOUNTER — APPOINTMENT (OUTPATIENT)
Dept: GENERAL RADIOLOGY | Age: 59
End: 2024-11-16
Payer: COMMERCIAL

## 2024-11-16 VITALS
HEART RATE: 96 BPM | RESPIRATION RATE: 20 BRPM | BODY MASS INDEX: 22.76 KG/M2 | OXYGEN SATURATION: 96 % | DIASTOLIC BLOOD PRESSURE: 89 MMHG | WEIGHT: 159 LBS | HEIGHT: 70 IN | TEMPERATURE: 97.8 F | SYSTOLIC BLOOD PRESSURE: 141 MMHG

## 2024-11-16 DIAGNOSIS — J40 BRONCHITIS: Primary | ICD-10-CM

## 2024-11-16 DIAGNOSIS — R94.31 ABNORMAL EKG: ICD-10-CM

## 2024-11-16 LAB
ALBUMIN SERPL-MCNC: 4.1 G/DL (ref 3.4–5)
ALBUMIN/GLOB SERPL: 1.4 {RATIO} (ref 1.1–2.2)
ALP SERPL-CCNC: 70 U/L (ref 40–129)
ALT SERPL-CCNC: 23 U/L (ref 10–40)
ANION GAP SERPL CALCULATED.3IONS-SCNC: 12 MMOL/L (ref 3–16)
AST SERPL-CCNC: 39 U/L (ref 15–37)
BASOPHILS # BLD: 0 K/UL (ref 0–0.2)
BASOPHILS NFR BLD: 0.5 %
BILIRUB SERPL-MCNC: 0.6 MG/DL (ref 0–1)
BUN SERPL-MCNC: 21 MG/DL (ref 7–20)
CALCIUM SERPL-MCNC: 9.1 MG/DL (ref 8.3–10.6)
CHLORIDE SERPL-SCNC: 105 MMOL/L (ref 99–110)
CO2 SERPL-SCNC: 23 MMOL/L (ref 21–32)
CREAT SERPL-MCNC: 1.1 MG/DL (ref 0.9–1.3)
DEPRECATED RDW RBC AUTO: 15.6 % (ref 12.4–15.4)
EOSINOPHIL # BLD: 0.1 K/UL (ref 0–0.6)
EOSINOPHIL NFR BLD: 0.9 %
FLUAV RNA RESP QL NAA+PROBE: NOT DETECTED
FLUBV RNA RESP QL NAA+PROBE: NOT DETECTED
GFR SERPLBLD CREATININE-BSD FMLA CKD-EPI: 77 ML/MIN/{1.73_M2}
GLUCOSE SERPL-MCNC: 163 MG/DL (ref 70–99)
HCT VFR BLD AUTO: 41.7 % (ref 40.5–52.5)
HGB BLD-MCNC: 14 G/DL (ref 13.5–17.5)
LYMPHOCYTES # BLD: 1.4 K/UL (ref 1–5.1)
LYMPHOCYTES NFR BLD: 24 %
MCH RBC QN AUTO: 32.6 PG (ref 26–34)
MCHC RBC AUTO-ENTMCNC: 33.6 G/DL (ref 31–36)
MCV RBC AUTO: 97 FL (ref 80–100)
MONOCYTES # BLD: 0.2 K/UL (ref 0–1.3)
MONOCYTES NFR BLD: 4.2 %
NEUTROPHILS # BLD: 4.1 K/UL (ref 1.7–7.7)
NEUTROPHILS NFR BLD: 70.4 %
NT-PROBNP SERPL-MCNC: 1783 PG/ML (ref 0–124)
PLATELET # BLD AUTO: 124 K/UL (ref 135–450)
PMV BLD AUTO: 9.5 FL (ref 5–10.5)
POTASSIUM SERPL-SCNC: 4.4 MMOL/L (ref 3.5–5.1)
PROT SERPL-MCNC: 7 G/DL (ref 6.4–8.2)
RBC # BLD AUTO: 4.3 M/UL (ref 4.2–5.9)
SARS-COV-2 RNA RESP QL NAA+PROBE: NOT DETECTED
SODIUM SERPL-SCNC: 140 MMOL/L (ref 136–145)
TROPONIN, HIGH SENSITIVITY: 65 NG/L (ref 0–22)
WBC # BLD AUTO: 5.9 K/UL (ref 4–11)

## 2024-11-16 PROCEDURE — 6370000000 HC RX 637 (ALT 250 FOR IP): Performed by: STUDENT IN AN ORGANIZED HEALTH CARE EDUCATION/TRAINING PROGRAM

## 2024-11-16 PROCEDURE — 94640 AIRWAY INHALATION TREATMENT: CPT

## 2024-11-16 PROCEDURE — 71046 X-RAY EXAM CHEST 2 VIEWS: CPT

## 2024-11-16 PROCEDURE — 84484 ASSAY OF TROPONIN QUANT: CPT

## 2024-11-16 PROCEDURE — 93005 ELECTROCARDIOGRAM TRACING: CPT | Performed by: STUDENT IN AN ORGANIZED HEALTH CARE EDUCATION/TRAINING PROGRAM

## 2024-11-16 PROCEDURE — 99285 EMERGENCY DEPT VISIT HI MDM: CPT

## 2024-11-16 PROCEDURE — 83880 ASSAY OF NATRIURETIC PEPTIDE: CPT

## 2024-11-16 PROCEDURE — 85025 COMPLETE CBC W/AUTO DIFF WBC: CPT

## 2024-11-16 PROCEDURE — 87636 SARSCOV2 & INF A&B AMP PRB: CPT

## 2024-11-16 PROCEDURE — 94760 N-INVAS EAR/PLS OXIMETRY 1: CPT

## 2024-11-16 PROCEDURE — 80053 COMPREHEN METABOLIC PANEL: CPT

## 2024-11-16 RX ORDER — PREDNISONE 10 MG/1
60 TABLET ORAL DAILY
Qty: 30 TABLET | Refills: 0 | Status: SHIPPED | OUTPATIENT
Start: 2024-11-16 | End: 2024-11-21

## 2024-11-16 RX ORDER — ALBUTEROL SULFATE 90 UG/1
2 INHALANT RESPIRATORY (INHALATION) 4 TIMES DAILY PRN
Qty: 18 G | Refills: 0 | Status: SHIPPED | OUTPATIENT
Start: 2024-11-16

## 2024-11-16 RX ORDER — PREDNISONE 20 MG/1
50 TABLET ORAL ONCE
Status: COMPLETED | OUTPATIENT
Start: 2024-11-16 | End: 2024-11-16

## 2024-11-16 RX ORDER — IPRATROPIUM BROMIDE AND ALBUTEROL SULFATE 2.5; .5 MG/3ML; MG/3ML
1 SOLUTION RESPIRATORY (INHALATION) ONCE
Status: COMPLETED | OUTPATIENT
Start: 2024-11-16 | End: 2024-11-16

## 2024-11-16 RX ADMIN — IPRATROPIUM BROMIDE AND ALBUTEROL SULFATE 1 DOSE: .5; 3 SOLUTION RESPIRATORY (INHALATION) at 12:21

## 2024-11-16 RX ADMIN — PREDNISONE 50 MG: 20 TABLET ORAL at 11:43

## 2024-11-16 ASSESSMENT — LIFESTYLE VARIABLES: HOW OFTEN DO YOU HAVE A DRINK CONTAINING ALCOHOL: MONTHLY OR LESS

## 2024-11-16 ASSESSMENT — PAIN - FUNCTIONAL ASSESSMENT: PAIN_FUNCTIONAL_ASSESSMENT: NONE - DENIES PAIN

## 2024-11-16 NOTE — ED NOTES
Patient wishes to leave against medical advice at this time.  Physician aware and no new orders received at this time,  Patient informed that they are leaving against the advice of the attending physician, and that they have been informed of the risk involved in leaving and hereby release the attending physician and [facility] from all responsibility for any ill effects which may results from such departure.  Patient informed may return at any time for continued for further treatment, evaluation or deterioration of disposition.  Patient DID sign AMA form and paper copy maintained for medical records.

## 2024-11-16 NOTE — ED PROVIDER NOTES
[facility] EMERGENCY DEPARTMENT  EMERGENCY DEPARTMENT ENCOUNTER      Pt Name: Adithya Watts  MRN: [id number]  Birthdate 1965  Date of evaluation: 11/16/2024  Provider: Erlin Morocho MD    CHIEF COMPLAINT       Chief Complaint   Patient presents with    Cough     Arrived per self d/t a cough all week and has not slept well since Sunday; tried OTC cough syrup without results         HISTORY OF PRESENT ILLNESS   (Location/Symptom, Timing/Onset, Context/Setting, Quality, Duration, Modifying Factors, Severity)  Note limiting factors.   Adithya Watts is a 59 y.o. male who presents to the emergency department with productive cough ongoing for about 1 week.  Patient reports feeling congested in his nose and chest.  He has tried over-the-counter cough syrup without improvement.  No fevers or chills.  Occasional dyspnea with this and also what he describes as \"chest congestion\".  Has not been checked for COVID or flu.  No peripheral edema.    Chart reviewed: History of systolic heart failure, smoker.  ICD is in place.  Nursing Notes were reviewed.    REVIEW OF SYSTEMS    (2-9 systems for level 4, 10 or more for level 5)     Review of Systems    Except as noted above the remainder of the review of systems was reviewed and negative.       PAST MEDICAL HISTORY     Past Medical History:   Diagnosis Date    Acute on chronic systolic (congestive) heart failure (HCC) 1/21/2022    Acute systolic CHF (congestive heart failure), NYHA class 3 (HCC) 2/27/2020    Asthma          SURGICAL HISTORY       Past Surgical History:   Procedure Laterality Date    HERNIA REPAIR           CURRENT MEDICATIONS       Previous Medications    ATORVASTATIN (LIPITOR) 40 MG TABLET    Take 1 tablet by mouth daily    CARVEDILOL (COREG) 25 MG TABLET    Take 1 tablet by mouth 2 times daily    DAPAGLIFLOZIN (FARXIGA) 10 MG TABLET    Take 1 tablet by mouth every morning    DIGOXIN (LANOXIN) 125 MCG TABLET    Take 1 tablet by mouth three times a week

## 2024-11-17 LAB
EKG ATRIAL RATE: 98 BPM
EKG DIAGNOSIS: NORMAL
EKG P AXIS: 70 DEGREES
EKG P-R INTERVAL: 144 MS
EKG Q-T INTERVAL: 300 MS
EKG QRS DURATION: 100 MS
EKG QTC CALCULATION (BAZETT): 383 MS
EKG R AXIS: -33 DEGREES
EKG T AXIS: 93 DEGREES
EKG VENTRICULAR RATE: 98 BPM

## 2024-11-17 PROCEDURE — 93010 ELECTROCARDIOGRAM REPORT: CPT | Performed by: INTERNAL MEDICINE

## 2025-01-31 ENCOUNTER — HOSPITAL ENCOUNTER (OUTPATIENT)
Age: 60
Discharge: HOME | End: 2025-01-31
Payer: COMMERCIAL

## 2025-01-31 ENCOUNTER — HOSPITAL ENCOUNTER (OUTPATIENT)
Dept: GENERAL RADIOLOGY | Age: 60
Discharge: HOME | End: 2025-01-31
Payer: COMMERCIAL

## 2025-01-31 DIAGNOSIS — I50.22 CHRONIC SYSTOLIC CHF (CONGESTIVE HEART FAILURE) (HCC): ICD-10-CM

## 2025-01-31 LAB
25(OH)D3 SERPL-MCNC: 11.4 NG/ML
ALBUMIN SERPL-MCNC: 4.5 G/DL (ref 3.4–5)
ALBUMIN/GLOB SERPL: 1.7 {RATIO} (ref 1.1–2.2)
ALP SERPL-CCNC: 65 U/L (ref 40–129)
ALT SERPL-CCNC: 20 U/L (ref 10–40)
ANION GAP SERPL CALCULATED.3IONS-SCNC: 10 MMOL/L (ref 3–16)
AST SERPL-CCNC: 23 U/L (ref 15–37)
BILIRUB SERPL-MCNC: 1.4 MG/DL (ref 0–1)
BUN SERPL-MCNC: 13 MG/DL (ref 7–20)
CALCIUM SERPL-MCNC: 9.4 MG/DL (ref 8.3–10.6)
CHLORIDE SERPL-SCNC: 105 MMOL/L (ref 99–110)
CHOLEST SERPL-MCNC: 123 MG/DL (ref 0–199)
CO2 SERPL-SCNC: 28 MMOL/L (ref 21–32)
CREAT SERPL-MCNC: 1.2 MG/DL (ref 0.9–1.3)
GFR SERPLBLD CREATININE-BSD FMLA CKD-EPI: 70 ML/MIN/{1.73_M2}
GLUCOSE SERPL-MCNC: 95 MG/DL (ref 70–99)
HCV AB SERPL QL IA: NORMAL
HDLC SERPL-MCNC: 63 MG/DL (ref 40–60)
LDLC SERPL CALC-MCNC: 46 MG/DL
NT-PROBNP SERPL-MCNC: 1814 PG/ML (ref 0–124)
POTASSIUM SERPL-SCNC: 4 MMOL/L (ref 3.5–5.1)
PROT SERPL-MCNC: 7.1 G/DL (ref 6.4–8.2)
SODIUM SERPL-SCNC: 143 MMOL/L (ref 136–145)
TRIGL SERPL-MCNC: 72 MG/DL (ref 0–150)
TSH SERPL DL<=0.005 MIU/L-ACNC: 1.71 UIU/ML (ref 0.27–4.2)
VLDLC SERPL CALC-MCNC: 14 MG/DL

## 2025-01-31 PROCEDURE — 83036 HEMOGLOBIN GLYCOSYLATED A1C: CPT

## 2025-01-31 PROCEDURE — 84443 ASSAY THYROID STIM HORMONE: CPT

## 2025-01-31 PROCEDURE — 80053 COMPREHEN METABOLIC PANEL: CPT

## 2025-01-31 PROCEDURE — 36415 COLL VENOUS BLD VENIPUNCTURE: CPT

## 2025-01-31 PROCEDURE — 82306 VITAMIN D 25 HYDROXY: CPT

## 2025-01-31 PROCEDURE — 86803 HEPATITIS C AB TEST: CPT

## 2025-01-31 PROCEDURE — 83880 ASSAY OF NATRIURETIC PEPTIDE: CPT

## 2025-01-31 PROCEDURE — 71046 X-RAY EXAM CHEST 2 VIEWS: CPT

## 2025-01-31 PROCEDURE — 80061 LIPID PANEL: CPT

## 2025-02-01 LAB
EST. AVERAGE GLUCOSE BLD GHB EST-MCNC: 122.6 MG/DL
HBA1C MFR BLD: 5.9 %

## 2025-02-05 LAB
PROSTATE SPECIFIC ANTIGEN: 1.03 NG/ML (ref 0–4)
PSA FREE MFR SERPL: 19.4 %
PSA FREE SERPL-MCNC: 0.2 UG/L

## 2025-04-03 DIAGNOSIS — I50.22 CHRONIC SYSTOLIC HEART FAILURE (HCC): Primary | ICD-10-CM

## 2025-04-03 RX ORDER — DAPAGLIFLOZIN 10 MG/1
10 TABLET, FILM COATED ORAL EVERY MORNING
Qty: 90 TABLET | Refills: 0 | Status: SHIPPED | OUTPATIENT
Start: 2025-04-03 | End: 2025-04-04

## 2025-04-03 RX ORDER — FUROSEMIDE 20 MG/1
TABLET ORAL
Qty: 90 TABLET | Refills: 0 | Status: SHIPPED | OUTPATIENT
Start: 2025-04-03

## 2025-04-04 ENCOUNTER — TELEPHONE (OUTPATIENT)
Dept: ADMINISTRATIVE | Age: 60
End: 2025-04-04

## 2025-04-04 DIAGNOSIS — I50.22 CHRONIC SYSTOLIC HEART FAILURE (HCC): ICD-10-CM

## 2025-04-04 RX ORDER — DAPAGLIFLOZIN 10 MG/1
10 TABLET, FILM COATED ORAL EVERY MORNING
COMMUNITY
End: 2025-04-04 | Stop reason: SDUPTHER

## 2025-04-04 RX ORDER — DAPAGLIFLOZIN 10 MG/1
10 TABLET, FILM COATED ORAL EVERY MORNING
Qty: 90 TABLET | Refills: 1 | Status: SHIPPED | OUTPATIENT
Start: 2025-04-04 | End: 2025-04-04 | Stop reason: SDUPTHER

## 2025-04-04 RX ORDER — DAPAGLIFLOZIN 10 MG/1
10 TABLET, FILM COATED ORAL EVERY MORNING
Qty: 90 TABLET | Refills: 1 | Status: SHIPPED | OUTPATIENT
Start: 2025-04-04

## 2025-04-04 NOTE — TELEPHONE ENCOUNTER
We received a Prior Authorization for Farxiga.      This team was made aware that Farxiga now comes in Generic (Dapagliflozin), but the brand name is still preferred on most insurances.      May we please get a new prescription for Farxiga submitted with QUEENIE so the patient's treatment is not delayed with sending in a PA for a non-preferred drug?    If this requires a response please respond to the pool ( P [facility] MEDICATION PRE-AUTH).      Thank you please advise patient.

## 2025-04-04 NOTE — TELEPHONE ENCOUNTER
Need to send in a new refill request since farxiga is preferred by insurance not the generic updated MAR

## 2025-05-09 ENCOUNTER — TELEPHONE (OUTPATIENT)
Dept: CARDIOLOGY CLINIC | Age: 60
End: 2025-05-09

## 2025-05-09 RX ORDER — DIGOXIN 125 MCG
125 TABLET ORAL
Qty: 15 TABLET | Refills: 0 | Status: SHIPPED | OUTPATIENT
Start: 2025-05-09

## 2025-05-09 RX ORDER — FUROSEMIDE 20 MG/1
TABLET ORAL
Qty: 30 TABLET | Refills: 0 | Status: SHIPPED | OUTPATIENT
Start: 2025-05-09

## 2025-05-09 NOTE — TELEPHONE ENCOUNTER
Medication Refill    Medication needing refilled:  furosemide (LASIX)   Dosage of the medication:  20 MG tablet   How are you taking this medication (QD, BID, TID, QID, PRN):    30 or 90 day supply called in:    When will you run out of your medication:    Which Pharmacy are we sending the medication to?:  [facility] [address] - P [phone] - F [phone]  [address] [place] [address]  Phone: [phone]  Fax: [phone]               Medication Refill    Medication needing refilled:  digoxin (LANOXIN   Dosage of the medication:  125 MCG tablet   How are you taking this medication (QD, BID, TID, QID, PRN):    30 or 90 day supply called in:    When will you run out of your medication:    Which Pharmacy are we sending the medication to?:  [facility] [place] [address] - P [phone] - F [phone]  [address] [place] [address]  Phone: [phone]  Fax: [phone]            Medication Refill    Medication needing refilled:  eplerenone (INSPRA)   Dosage of the medication:  25 MG tablet   How are you taking this medication (QD, BID, TID, QID, PRN):    30 or 90 day supply called in:    When will you run out of your medication:    Which Pharmacy are we sending the medication to?:  [facility] [address] - P [phone] - F [phone]  [address] [place] [address]  Phone: [phone]  Fax: [phone]

## 2025-05-16 ENCOUNTER — HOSPITAL ENCOUNTER (OUTPATIENT)
Age: 60
Discharge: HOME | End: 2025-05-16
Payer: COMMERCIAL

## 2025-05-16 ENCOUNTER — OFFICE VISIT (OUTPATIENT)
Dept: CARDIOLOGY CLINIC | Age: 60
End: 2025-05-16

## 2025-05-16 VITALS
HEIGHT: 70 IN | HEART RATE: 82 BPM | DIASTOLIC BLOOD PRESSURE: 74 MMHG | SYSTOLIC BLOOD PRESSURE: 116 MMHG | OXYGEN SATURATION: 95 % | WEIGHT: 154 LBS | BODY MASS INDEX: 22.05 KG/M2

## 2025-05-16 DIAGNOSIS — E55.9 VITAMIN D DEFICIENCY: ICD-10-CM

## 2025-05-16 DIAGNOSIS — I50.22 CHRONIC SYSTOLIC HEART FAILURE (HCC): ICD-10-CM

## 2025-05-16 DIAGNOSIS — I50.22 CHRONIC SYSTOLIC HEART FAILURE (HCC): Primary | ICD-10-CM

## 2025-05-16 DIAGNOSIS — F10.10 ALCOHOL ABUSE: ICD-10-CM

## 2025-05-16 DIAGNOSIS — Z95.810 ICD (IMPLANTABLE CARDIOVERTER-DEFIBRILLATOR), SINGLE, IN SITU: ICD-10-CM

## 2025-05-16 DIAGNOSIS — I10 ESSENTIAL HYPERTENSION: ICD-10-CM

## 2025-05-16 LAB
ALBUMIN SERPL-MCNC: 4 G/DL (ref 3.4–5)
ALBUMIN/GLOB SERPL: 1.5 {RATIO} (ref 1.1–2.2)
ALP SERPL-CCNC: 67 U/L (ref 40–129)
ALT SERPL-CCNC: 23 U/L (ref 10–40)
AMYLASE SERPL-CCNC: 129 U/L (ref 25–115)
ANION GAP SERPL CALCULATED.3IONS-SCNC: 11 MMOL/L (ref 3–16)
AST SERPL-CCNC: 25 U/L (ref 15–37)
BILIRUB SERPL-MCNC: 1 MG/DL (ref 0–1)
BUN SERPL-MCNC: 12 MG/DL (ref 7–20)
CALCIUM SERPL-MCNC: 9.3 MG/DL (ref 8.3–10.6)
CHLORIDE SERPL-SCNC: 104 MMOL/L (ref 99–110)
CO2 SERPL-SCNC: 25 MMOL/L (ref 21–32)
CREAT SERPL-MCNC: 1.1 MG/DL (ref 0.9–1.3)
DEPRECATED RDW RBC AUTO: 14.9 % (ref 12.4–15.4)
GFR SERPLBLD CREATININE-BSD FMLA CKD-EPI: 77 ML/MIN/{1.73_M2}
GLUCOSE SERPL-MCNC: 128 MG/DL (ref 70–99)
HCT VFR BLD AUTO: 39.8 % (ref 40.5–52.5)
HGB BLD-MCNC: 13.5 G/DL (ref 13.5–17.5)
LIPASE SERPL-CCNC: 27 U/L (ref 13–60)
MCH RBC QN AUTO: 31.8 PG (ref 26–34)
MCHC RBC AUTO-ENTMCNC: 33.8 G/DL (ref 31–36)
MCV RBC AUTO: 94.1 FL (ref 80–100)
NT-PROBNP SERPL-MCNC: 3052 PG/ML (ref 0–124)
PLATELET # BLD AUTO: 98 K/UL (ref 135–450)
PLATELET BLD QL SMEAR: ABNORMAL
PMV BLD AUTO: 11.1 FL (ref 5–10.5)
POTASSIUM SERPL-SCNC: 4.2 MMOL/L (ref 3.5–5.1)
PROT SERPL-MCNC: 6.7 G/DL (ref 6.4–8.2)
RBC # BLD AUTO: 4.23 M/UL (ref 4.2–5.9)
SLIDE REVIEW: ABNORMAL
SODIUM SERPL-SCNC: 140 MMOL/L (ref 136–145)
WBC # BLD AUTO: 6 K/UL (ref 4–11)

## 2025-05-16 PROCEDURE — 80053 COMPREHEN METABOLIC PANEL: CPT

## 2025-05-16 PROCEDURE — 36415 COLL VENOUS BLD VENIPUNCTURE: CPT

## 2025-05-16 PROCEDURE — 85027 COMPLETE CBC AUTOMATED: CPT

## 2025-05-16 PROCEDURE — 82150 ASSAY OF AMYLASE: CPT

## 2025-05-16 PROCEDURE — 83880 ASSAY OF NATRIURETIC PEPTIDE: CPT

## 2025-05-16 PROCEDURE — 83690 ASSAY OF LIPASE: CPT

## 2025-05-16 NOTE — PROGRESS NOTES
[facility]  Progress Note    Primary Care Doctor:  Joe Madrid MD    Chief Complaint   Patient presents with    Congestive Heart Failure    Abdominal Pain     Spitting up blood        History of Present Illness:  59 y.o. male with history of non compliance with medications, uncontrolled HTN, alcohol and smokes  2/24-27/2020 for acute sHF with LVEF of 15-20%, diuresed; uncontrolled HTN due to running out of insurance and stopping his medications; ABRAN on CKD  entresto 4/14/2021  ICD placed 3/24/2023    I had the pleasure of seeing Adithya Watts in follow up for systolic heart failure.  He is ambulatory and by his self.  He is complaining of left upper quadrant abdominal pain #6 out of 10 off and on for 1 month.  He has spit up some blood and even had blood in his stool on tissue paper.  He has not sure he has had a colonoscopy.  The pain is reproducible on the left upper quadrant today (golf ball size knot).  His optival shows normal thoracic impedence.  He has not drank any alcohol for 3 weeks but continues cigars.  He is taking all his medications.  He complains of overall fatigue    1) What brings you the most robinson/happiness in life?   Yan my son      2) What would be a physical goal you would like to achieve in the future?   Better health      3) I feel that my heart health is rated:  ( 0 being very poor, 10 being excellent )  : 5        Past Medical History:   has a past medical history of Acute on chronic systolic (congestive) heart failure (HCC), Acute systolic CHF (congestive heart failure), NYHA class 3 (HCC), and Asthma.  Surgical History:   has a past surgical history that includes hernia repair.   Social History:   reports that he has been smoking cigars. He has never used smokeless tobacco. He reports current alcohol use of about 4.2 standard drinks of alcohol per week. He reports that he does not use drugs.   Family History:   Family History   Problem Relation Age of Onset    High Blood

## 2025-05-16 NOTE — PATIENT INSTRUCTIONS
Blood work today  If any are abnormal he agrees to go to ED  He needs to follow up with Dr Doyle  Need to stop alcohol and smoking cigars  RTO in 3 months and echo

## 2025-05-17 ENCOUNTER — RESULTS FOLLOW-UP (OUTPATIENT)
Dept: CARDIOLOGY CLINIC | Age: 60
End: 2025-05-17

## 2025-05-19 NOTE — TELEPHONE ENCOUNTER
----- Message from CHICHI Best sent at 5/19/2025  8:57 AM EDT -----  Please call him as his fluid level is elevated and need to increase his lasix  If he is still having abdominal pain he needs to go see his PCP or ED  Thanks    He has been having the abdominal pain. He will call PCP to be seen. If he can't get in this week he will go to the ED to be evaluated.

## 2025-05-20 NOTE — TELEPHONE ENCOUNTER
----- Message from CHICHI Best - CNS sent at 5/20/2025  8:32 AM EDT -----  Ask him to take an extra 20 mg of lasix for 3 days as well    Spoke to patient he verbalized understanding.

## 2025-06-01 ENCOUNTER — HOSPITAL ENCOUNTER (EMERGENCY)
Age: 60
Discharge: LEFT BEFORE DISCHARGE | End: 2025-06-01
Attending: STUDENT IN AN ORGANIZED HEALTH CARE EDUCATION/TRAINING PROGRAM | Admitting: HOSPITALIST
Payer: COMMERCIAL

## 2025-06-01 ENCOUNTER — APPOINTMENT (OUTPATIENT)
Dept: GENERAL RADIOLOGY | Age: 60
End: 2025-06-01
Payer: COMMERCIAL

## 2025-06-01 ENCOUNTER — APPOINTMENT (OUTPATIENT)
Dept: CT IMAGING | Age: 60
End: 2025-06-01
Payer: COMMERCIAL

## 2025-06-01 VITALS
HEART RATE: 75 BPM | WEIGHT: 155 LBS | RESPIRATION RATE: 16 BRPM | TEMPERATURE: 97.9 F | DIASTOLIC BLOOD PRESSURE: 96 MMHG | SYSTOLIC BLOOD PRESSURE: 128 MMHG | HEIGHT: 70 IN | BODY MASS INDEX: 22.19 KG/M2 | OXYGEN SATURATION: 98 %

## 2025-06-01 DIAGNOSIS — R91.8 ABNORMAL CT LUNG SCREENING: ICD-10-CM

## 2025-06-01 DIAGNOSIS — N17.9 AKI (ACUTE KIDNEY INJURY): ICD-10-CM

## 2025-06-01 DIAGNOSIS — R79.89 ELEVATED TROPONIN: ICD-10-CM

## 2025-06-01 DIAGNOSIS — R06.02 SHORTNESS OF BREATH: Primary | ICD-10-CM

## 2025-06-01 DIAGNOSIS — I50.9 ACUTE ON CHRONIC CONGESTIVE HEART FAILURE, UNSPECIFIED HEART FAILURE TYPE (HCC): ICD-10-CM

## 2025-06-01 PROBLEM — R06.00 DYSPNEA: Status: ACTIVE | Noted: 2025-06-01

## 2025-06-01 LAB
ALBUMIN SERPL-MCNC: 3.9 G/DL (ref 3.4–5)
ALBUMIN/GLOB SERPL: 1.3 {RATIO} (ref 1.1–2.2)
ALP SERPL-CCNC: 77 U/L (ref 40–129)
ALT SERPL-CCNC: 19 U/L (ref 10–40)
ANION GAP SERPL CALCULATED.3IONS-SCNC: 11 MMOL/L (ref 3–16)
AST SERPL-CCNC: 23 U/L (ref 15–37)
BASOPHILS # BLD: 0 K/UL (ref 0–0.2)
BASOPHILS NFR BLD: 0.5 %
BILIRUB SERPL-MCNC: 1.1 MG/DL (ref 0–1)
BUN SERPL-MCNC: 18 MG/DL (ref 7–20)
CALCIUM SERPL-MCNC: 9.4 MG/DL (ref 8.3–10.6)
CHLORIDE SERPL-SCNC: 106 MMOL/L (ref 99–110)
CO2 SERPL-SCNC: 24 MMOL/L (ref 21–32)
CREAT SERPL-MCNC: 1.4 MG/DL (ref 0.9–1.3)
DEPRECATED RDW RBC AUTO: 15.5 % (ref 12.4–15.4)
EOSINOPHIL # BLD: 0 K/UL (ref 0–0.6)
EOSINOPHIL NFR BLD: 0.7 %
GFR SERPLBLD CREATININE-BSD FMLA CKD-EPI: 58 ML/MIN/{1.73_M2}
GLUCOSE SERPL-MCNC: 105 MG/DL (ref 70–99)
HCT VFR BLD AUTO: 40 % (ref 40.5–52.5)
HGB BLD-MCNC: 13.4 G/DL (ref 13.5–17.5)
LIPASE SERPL-CCNC: 21 U/L (ref 13–60)
LYMPHOCYTES # BLD: 1.8 K/UL (ref 1–5.1)
LYMPHOCYTES NFR BLD: 25.9 %
MAGNESIUM SERPL-MCNC: 1.71 MG/DL (ref 1.8–2.4)
MCH RBC QN AUTO: 31.8 PG (ref 26–34)
MCHC RBC AUTO-ENTMCNC: 33.5 G/DL (ref 31–36)
MCV RBC AUTO: 95.1 FL (ref 80–100)
MONOCYTES # BLD: 0.6 K/UL (ref 0–1.3)
MONOCYTES NFR BLD: 8.2 %
NEUTROPHILS # BLD: 4.4 K/UL (ref 1.7–7.7)
NEUTROPHILS NFR BLD: 64.7 %
NT-PROBNP SERPL-MCNC: 4879 PG/ML (ref 0–124)
PLATELET # BLD AUTO: 114 K/UL (ref 135–450)
PMV BLD AUTO: 9.7 FL (ref 5–10.5)
POTASSIUM SERPL-SCNC: 4.1 MMOL/L (ref 3.5–5.1)
PROT SERPL-MCNC: 6.8 G/DL (ref 6.4–8.2)
RBC # BLD AUTO: 4.2 M/UL (ref 4.2–5.9)
SODIUM SERPL-SCNC: 141 MMOL/L (ref 136–145)
TROPONIN, HIGH SENSITIVITY: 27 NG/L (ref 0–22)
TROPONIN, HIGH SENSITIVITY: 28 NG/L (ref 0–22)
WBC # BLD AUTO: 6.9 K/UL (ref 4–11)

## 2025-06-01 PROCEDURE — 99285 EMERGENCY DEPT VISIT HI MDM: CPT

## 2025-06-01 PROCEDURE — 6360000004 HC RX CONTRAST MEDICATION: Performed by: PHYSICIAN ASSISTANT

## 2025-06-01 PROCEDURE — 83880 ASSAY OF NATRIURETIC PEPTIDE: CPT

## 2025-06-01 PROCEDURE — 83690 ASSAY OF LIPASE: CPT

## 2025-06-01 PROCEDURE — 85025 COMPLETE CBC W/AUTO DIFF WBC: CPT

## 2025-06-01 PROCEDURE — 80053 COMPREHEN METABOLIC PANEL: CPT

## 2025-06-01 PROCEDURE — 83735 ASSAY OF MAGNESIUM: CPT

## 2025-06-01 PROCEDURE — 93005 ELECTROCARDIOGRAM TRACING: CPT | Performed by: STUDENT IN AN ORGANIZED HEALTH CARE EDUCATION/TRAINING PROGRAM

## 2025-06-01 PROCEDURE — 6370000000 HC RX 637 (ALT 250 FOR IP): Performed by: STUDENT IN AN ORGANIZED HEALTH CARE EDUCATION/TRAINING PROGRAM

## 2025-06-01 PROCEDURE — 71260 CT THORAX DX C+: CPT

## 2025-06-01 PROCEDURE — 6370000000 HC RX 637 (ALT 250 FOR IP): Performed by: PHYSICIAN ASSISTANT

## 2025-06-01 PROCEDURE — 84484 ASSAY OF TROPONIN QUANT: CPT

## 2025-06-01 PROCEDURE — 71045 X-RAY EXAM CHEST 1 VIEW: CPT

## 2025-06-01 RX ORDER — LEVOFLOXACIN 750 MG/1
750 TABLET, FILM COATED ORAL ONCE
Status: COMPLETED | OUTPATIENT
Start: 2025-06-01 | End: 2025-06-01

## 2025-06-01 RX ORDER — ASPIRIN 81 MG/1
324 TABLET, CHEWABLE ORAL ONCE
Status: COMPLETED | OUTPATIENT
Start: 2025-06-01 | End: 2025-06-01

## 2025-06-01 RX ADMIN — LEVOFLOXACIN 750 MG: 750 TABLET, FILM COATED ORAL at 20:04

## 2025-06-01 RX ADMIN — IOHEXOL 75 ML: 350 INJECTION, SOLUTION INTRAVENOUS at 19:36

## 2025-06-01 RX ADMIN — ASPIRIN 324 MG: 81 TABLET, CHEWABLE ORAL at 20:41

## 2025-06-01 ASSESSMENT — PAIN - FUNCTIONAL ASSESSMENT: PAIN_FUNCTIONAL_ASSESSMENT: NONE - DENIES PAIN

## 2025-06-02 ENCOUNTER — TELEPHONE (OUTPATIENT)
Dept: INTERNAL MEDICINE CLINIC | Age: 60
End: 2025-06-02

## 2025-06-02 ENCOUNTER — OFFICE VISIT (OUTPATIENT)
Dept: INTERNAL MEDICINE CLINIC | Age: 60
End: 2025-06-02
Payer: COMMERCIAL

## 2025-06-02 VITALS
DIASTOLIC BLOOD PRESSURE: 80 MMHG | HEART RATE: 85 BPM | OXYGEN SATURATION: 98 % | SYSTOLIC BLOOD PRESSURE: 128 MMHG | HEIGHT: 70 IN | WEIGHT: 154.4 LBS | BODY MASS INDEX: 22.1 KG/M2

## 2025-06-02 DIAGNOSIS — E27.8 RIGHT ADRENAL MASS: ICD-10-CM

## 2025-06-02 DIAGNOSIS — R91.1 LESION OF RIGHT LUNG: ICD-10-CM

## 2025-06-02 DIAGNOSIS — I50.22 CHRONIC SYSTOLIC CHF (CONGESTIVE HEART FAILURE) (HCC): Primary | ICD-10-CM

## 2025-06-02 DIAGNOSIS — J44.1 CHRONIC OBSTRUCTIVE PULMONARY DISEASE WITH ACUTE EXACERBATION (HCC): ICD-10-CM

## 2025-06-02 LAB
EKG ATRIAL RATE: 80 BPM
EKG DIAGNOSIS: NORMAL
EKG P AXIS: 65 DEGREES
EKG P-R INTERVAL: 136 MS
EKG Q-T INTERVAL: 428 MS
EKG QRS DURATION: 102 MS
EKG QTC CALCULATION (BAZETT): 493 MS
EKG R AXIS: -57 DEGREES
EKG T AXIS: 134 DEGREES
EKG VENTRICULAR RATE: 80 BPM

## 2025-06-02 PROCEDURE — G8427 DOCREV CUR MEDS BY ELIG CLIN: HCPCS | Performed by: INTERNAL MEDICINE

## 2025-06-02 PROCEDURE — 3017F COLORECTAL CA SCREEN DOC REV: CPT | Performed by: INTERNAL MEDICINE

## 2025-06-02 PROCEDURE — G8420 CALC BMI NORM PARAMETERS: HCPCS | Performed by: INTERNAL MEDICINE

## 2025-06-02 PROCEDURE — 3079F DIAST BP 80-89 MM HG: CPT | Performed by: INTERNAL MEDICINE

## 2025-06-02 PROCEDURE — 99204 OFFICE O/P NEW MOD 45 MIN: CPT | Performed by: INTERNAL MEDICINE

## 2025-06-02 PROCEDURE — 3074F SYST BP LT 130 MM HG: CPT | Performed by: INTERNAL MEDICINE

## 2025-06-02 PROCEDURE — 93010 ELECTROCARDIOGRAM REPORT: CPT | Performed by: INTERNAL MEDICINE

## 2025-06-02 PROCEDURE — 4004F PT TOBACCO SCREEN RCVD TLK: CPT | Performed by: INTERNAL MEDICINE

## 2025-06-02 PROCEDURE — 3023F SPIROM DOC REV: CPT | Performed by: INTERNAL MEDICINE

## 2025-06-02 SDOH — ECONOMIC STABILITY: FOOD INSECURITY: WITHIN THE PAST 12 MONTHS, THE FOOD YOU BOUGHT JUST DIDN'T LAST AND YOU DIDN'T HAVE MONEY TO GET MORE.: NEVER TRUE

## 2025-06-02 SDOH — ECONOMIC STABILITY: FOOD INSECURITY: WITHIN THE PAST 12 MONTHS, YOU WORRIED THAT YOUR FOOD WOULD RUN OUT BEFORE YOU GOT MONEY TO BUY MORE.: NEVER TRUE

## 2025-06-02 ASSESSMENT — PATIENT HEALTH QUESTIONNAIRE - PHQ9
1. LITTLE INTEREST OR PLEASURE IN DOING THINGS: SEVERAL DAYS
SUM OF ALL RESPONSES TO PHQ QUESTIONS 1-9: 1
2. FEELING DOWN, DEPRESSED OR HOPELESS: NOT AT ALL
SUM OF ALL RESPONSES TO PHQ QUESTIONS 1-9: 1

## 2025-06-02 ASSESSMENT — ENCOUNTER SYMPTOMS: SHORTNESS OF BREATH: 1

## 2025-06-02 NOTE — ED PROVIDER NOTES
ED Attending Attestation Note    I personally saw the patient and made/approved the management plan and take responsibility for patient management.        Briefly, 59 y.o. male with past med history of noncompliance with medications, heart failure with a EF of 15 to 20% back in 2020 with an ICD placed in 2023 with echo in 2023 showing an EF of 25 to 30%.  With global hypokinesis with inferior akinesis.  Last cardiac cath 2020 RCA dominant and overall normal coronary angiography nonischemic cardiomyopathy presents with generalized fatigue last week mild nonproductive cough, dyspnea exertion orthopnea...       Focused exam:   Physical Exam  Vitals and nursing note reviewed.   Constitutional:       Appearance: He is ill-appearing. He is not toxic-appearing or diaphoretic.   HENT:      Head: Normocephalic and atraumatic.      Right Ear: External ear normal.      Left Ear: External ear normal.      Nose: Nose normal.   Eyes:      Conjunctiva/sclera: Conjunctivae normal.   Cardiovascular:      Rate and Rhythm: Normal rate and regular rhythm.      Heart sounds: Normal heart sounds. No murmur heard.     No gallop.   Pulmonary:      Effort: Pulmonary effort is normal. No respiratory distress.      Breath sounds: No stridor. No wheezing or rhonchi.   Abdominal:      General: There is no distension.      Palpations: Abdomen is soft.      Tenderness: There is no abdominal tenderness. There is no guarding.   Musculoskeletal:      Right lower leg: Edema present.      Left lower leg: Edema present.   Skin:     General: Skin is warm and dry.      Capillary Refill: Capillary refill takes less than 2 seconds.   Neurological:      Mental Status: He is alert.      GCS: GCS eye subscore is 4. GCS verbal subscore is 5. GCS motor subscore is 6.           Imaging:   CT CHEST PULMONARY EMBOLISM W CONTRAST   Final Result   1.  No definite acute pulmonary embolism.  Suboptimal pulmonary artery   opacification on exam.   2. New 2.3 cm mass 
TIME (.cctime)       PAST MEDICAL HISTORY      has a past medical history of Acute on chronic systolic (congestive) heart failure (HCC) (1/21/2022), Acute systolic CHF (congestive heart failure), NYHA class 3 (HCC) (2/27/2020), and Asthma.     EMERGENCY DEPARTMENT COURSE and DIFFERENTIAL DIAGNOSIS/MDM:   Vitals:    Vitals:    06/01/25 1758   BP: (!) 128/96   Pulse: 75   Resp: 16   Temp: 97.9 °F (36.6 °C)   TempSrc: Oral   SpO2: 98%   Weight: 70.3 kg (155 lb)   Height: 1.778 m (5' 10\")       Is this patient to be included in the SEP-1 Core Measure due to severe sepsis or septic shock?   No   Exclusion criteria - the patient is NOT to be included for SEP-1 Core Measure due to:  Infection is not suspected    Patient was given the following medications:  Medications   levoFLOXacin (LEVAQUIN) tablet 750 mg (750 mg Oral Given 6/1/25 2004)   iohexol (OMNIPAQUE 350) solution 75 mL (75 mLs IntraVENous Given 6/1/25 1936)   aspirin chewable tablet 324 mg (324 mg Oral Given 6/1/25 2041)       ED Course as of 06/01/25 2128   Sun Jun 01, 2025   1924 Creatinine(!): 1.4  Mild ABRAN compared with most recent labs however comparable with prior ABRAN he had in the past [AL]   2032 CT CHEST PULMONARY EMBOLISM W CONTRAST  \"IMPRESSION:  1.  No definite acute pulmonary embolism.  Suboptimal pulmonary artery  opacification on exam.  2. New 2.3 cm mass versus rounded atelectasis lateral basal segment right  lower lobe.  Consider noncontrast chest CT surveillance at 3 months,  whole-body PET-CT imaging or tissue sampling.  3. Moderate to severe bronchomalacia right left mainstem bronchi.  4. Cardiomegaly.  Rim  5. Images are moderately degraded predominantly mid and lower lungs,  resulting from motion during study acquisition, blurring detail and resulting  in misregistration artifact.  6. Apparent 3 cm mass lesion at left adrenal body, possibly present  previously, not well characterized on this exam.  Recommend correlation with  noncontrast

## 2025-06-02 NOTE — PROGRESS NOTES
Patient comes to the office today for a new patient examination and to establish with a new primary care physician.  Patient has a past medical history that is listed below in the past medical history.  Currently, the patient is compliant with few health maintenance issues.  His medical problems include:    Patient Active Problem List   Diagnosis    Heart failure, unspecified (HCC)    Chronic systolic CHF (congestive heart failure) (HCC)    Essential hypertension    Smoker    Noncompliance    HLD (hyperlipidemia)    ICD (implantable cardioverter-defibrillator), single, in situ    Dyspnea       Past Medical History:   Diagnosis Date    Acute on chronic systolic (congestive) heart failure (HCC) 1/21/2022    Acute systolic CHF (congestive heart failure), NYHA class 3 (HCC) 2/27/2020    Asthma      Past Surgical History:   Procedure Laterality Date    HERNIA REPAIR       Family History   Problem Relation Age of Onset    High Blood Pressure Mother      Social History     Socioeconomic History    Marital status: Single     Spouse name: Not on file    Number of children: Not on file    Years of education: Not on file    Highest education level: Not on file   Occupational History    Not on file   Tobacco Use    Smoking status: Every Day     Types: Cigars    Smokeless tobacco: Never    Tobacco comments:     2 cigars/day   Vaping Use    Vaping status: Former   Substance and Sexual Activity    Alcohol use: Yes     Alcohol/week: 4.2 standard drinks of alcohol     Types: 5 Standard drinks or equivalent per week     Comment: 0cc    Drug use: No    Sexual activity: Yes     Partners: Female   Other Topics Concern    Not on file   Social History Narrative    Not on file     Social Drivers of Health     Financial Resource Strain: Low Risk  (5/25/2022)    Overall Financial Resource Strain (CARDIA)     Difficulty of Paying Living Expenses: Not hard at all   Food Insecurity: No Food Insecurity (6/2/2025)    Hunger Vital Sign

## 2025-06-02 NOTE — TELEPHONE ENCOUNTER
Care Transitions Initial Follow Up Call    Outreach made within 2 business days of discharge: Yes    Patient: Adithya Watts Patient : 1965   MRN: [id number]  Reason for Admission: SOB  Discharge Date: 25       Spoke with: patient    Discharge department/facility: [facility]    TCM Interactive Patient Contact:  Was patient able to fill all prescriptions: Yes  Was patient instructed to bring all medications to the follow-up visit: Yes  Is patient taking all medications as directed in the discharge summary? Yes  Does patient understand their discharge instructions: Yes  Does patient have questions or concerns that need addressed prior to 7-14 day follow up office visit: no    Additional needs identified to be addressed with provider  No needs identified             Scheduled appointment with PCP within 7 days    Follow Up  Future Appointments   Date Time Provider Department Center   2025  9:15 AM Miguel Doyle MD FF Cardio [facility]   2025 10:00 AM SCHEDULE, BINTA DEVICE CHECK FF Card[facility]   2025  9:30 AM [facility] ECHO 1 FZ KRISTINA [facility]   2025 10:30 AM Mirna Mancilla, APRN - CNS FF Card[facility]       Evelyn Garsia RN

## 2025-06-02 NOTE — ED NOTES
Pt requesting to leave AMA. Provider has been notified and has discussed benefits and risks with pt. AMA sign has been formed.

## 2025-06-02 NOTE — ED NOTES
Patient Name: Adithya Watts  : 1965 59 y.o.  MRN: [id number]  ED Room #: ED-0006/06     Chief complaint:   Chief Complaint   Patient presents with    Shortness of Breath     SOB and fatigue x 1 week.     Hospital Problem/Diagnosis:   Hospital Problems           Last Modified POA    * (Principal) Dyspnea 2025 Yes         O2 Flow Rate:O2 Device: None (Room air)   (if applicable)  Cardiac Rhythm:   (if applicable)  Active LDA's:           How does patient ambulate? Stand by assist    2. How does patient take pills? Whole with Water    3. Is patient alert? Alert    4. Is patient oriented? To Person, To Place, To Time, To Situation, and Follows Commands    5.   Patient arrived from:  home  Facility Name: ___________________________________________    6. If patient is disoriented or from a Skill Nursing Facility has family been notified of admission?     7. Patient belongings? Belongings: Clothing    Disposition of belongings? Kept with Patient     8. Any specific patient or family belongings/needs/dynamics?   a.     9. Miscellaneous comments/pending orders?  a.      If there are any additional questions please reach out to the Emergency Department.

## 2025-06-02 NOTE — PROGRESS NOTES
Pharmacy Home Medication Reconciliation Note    A medication reconciliation has been completed for Adithya Watts 1965    Pharmacy: [facility] [address], [place], [place]  Information provided by: patient and discharge med list from 5/16/25    The patient's home medication list is as follows:  No current facility-administered medications on file prior to encounter.     Current Outpatient Medications on File Prior to Encounter   Medication Sig Dispense Refill    furosemide (LASIX) 20 MG tablet Take 2 tablets by mouth daily And an extra 20 mg with increased sob (Patient taking differently: Take 2-3 tablets by mouth daily Take 2 tablets by mouth daily  And an extra tablet with increased sob) 30 tablet 0    digoxin (LANOXIN) 125 MCG tablet Take 1 tablet by mouth three times a week (Patient taking differently: Take 1 tablet by mouth Every Monday, Wednesday, and Friday) 15 tablet 0    FARXIGA 10 MG tablet Take 1 tablet by mouth every morning (Patient taking differently: Take 1 tablet by mouth daily) 90 tablet 1    albuterol sulfate HFA (VENTOLIN HFA) 108 (90 Base) MCG/ACT inhaler Inhale 2 puffs into the lungs 4 times daily as needed for Wheezing 18 g 0    sacubitril-valsartan (ENTRESTO)  MG per tablet Take 1 tablet by mouth 2 times daily 180 tablet 0    atorvastatin (LIPITOR) 40 MG tablet Take 1 tablet by mouth daily (Patient taking differently: Take 1 tablet by mouth Daily with lunch) 90 tablet 1    carvedilol (COREG) 25 MG tablet Take 1 tablet by mouth 2 times daily 180 tablet 0    eplerenone (INSPRA) 25 MG tablet Take 1 tablet by mouth daily 90 tablet 0    vitamin D3 (CHOLECALCIFEROL) 25 MCG (1000 UT) TABS tablet Take 1 tablet by mouth daily 90 tablet 1       Of note, patient took all AM and midday meds prior to ED arrival.    Timing of last doses updated.    Thank you,  Tam Vitale, Pharmacy Intern

## 2025-06-03 ENCOUNTER — HOSPITAL ENCOUNTER (OUTPATIENT)
Dept: CT IMAGING | Age: 60
Discharge: HOME | End: 2025-06-03
Attending: INTERNAL MEDICINE
Payer: COMMERCIAL

## 2025-06-03 DIAGNOSIS — R91.1 LESION OF RIGHT LUNG: ICD-10-CM

## 2025-06-03 DIAGNOSIS — I50.22 CHRONIC SYSTOLIC CHF (CONGESTIVE HEART FAILURE) (HCC): ICD-10-CM

## 2025-06-03 DIAGNOSIS — E27.8 RIGHT ADRENAL MASS: ICD-10-CM

## 2025-06-03 PROCEDURE — 74177 CT ABD & PELVIS W/CONTRAST: CPT

## 2025-06-03 PROCEDURE — 6360000004 HC RX CONTRAST MEDICATION: Performed by: INTERNAL MEDICINE

## 2025-06-03 RX ADMIN — IOHEXOL 75 ML: 350 INJECTION, SOLUTION INTRAVENOUS at 15:22

## 2025-06-04 DIAGNOSIS — C34.90: Primary | ICD-10-CM

## 2025-06-09 ENCOUNTER — OFFICE VISIT (OUTPATIENT)
Dept: PULMONOLOGY | Age: 60
End: 2025-06-09
Payer: COMMERCIAL

## 2025-06-09 VITALS
WEIGHT: 153.5 LBS | HEIGHT: 70 IN | DIASTOLIC BLOOD PRESSURE: 76 MMHG | BODY MASS INDEX: 21.98 KG/M2 | SYSTOLIC BLOOD PRESSURE: 122 MMHG | OXYGEN SATURATION: 100 % | HEART RATE: 53 BPM

## 2025-06-09 DIAGNOSIS — J44.9 COPD, SEVERITY TO BE DETERMINED (HCC): ICD-10-CM

## 2025-06-09 DIAGNOSIS — Z87.891 PERSONAL HISTORY OF TOBACCO USE, PRESENTING HAZARDS TO HEALTH: ICD-10-CM

## 2025-06-09 DIAGNOSIS — R91.1 PULMONARY NODULE: Primary | ICD-10-CM

## 2025-06-09 PROCEDURE — 3078F DIAST BP <80 MM HG: CPT | Performed by: INTERNAL MEDICINE

## 2025-06-09 PROCEDURE — 4004F PT TOBACCO SCREEN RCVD TLK: CPT | Performed by: INTERNAL MEDICINE

## 2025-06-09 PROCEDURE — G8427 DOCREV CUR MEDS BY ELIG CLIN: HCPCS | Performed by: INTERNAL MEDICINE

## 2025-06-09 PROCEDURE — 3023F SPIROM DOC REV: CPT | Performed by: INTERNAL MEDICINE

## 2025-06-09 PROCEDURE — G8420 CALC BMI NORM PARAMETERS: HCPCS | Performed by: INTERNAL MEDICINE

## 2025-06-09 PROCEDURE — 3017F COLORECTAL CA SCREEN DOC REV: CPT | Performed by: INTERNAL MEDICINE

## 2025-06-09 PROCEDURE — 3074F SYST BP LT 130 MM HG: CPT | Performed by: INTERNAL MEDICINE

## 2025-06-09 PROCEDURE — 99204 OFFICE O/P NEW MOD 45 MIN: CPT | Performed by: INTERNAL MEDICINE

## 2025-06-09 ASSESSMENT — ENCOUNTER SYMPTOMS
SINUS PRESSURE: 0
CONSTIPATION: 0
DIARRHEA: 0
NAUSEA: 0
ABDOMINAL PAIN: 0

## 2025-06-09 NOTE — PROGRESS NOTES
Readings from Last 3 Encounters:   06/09/25 122/76   06/02/25 128/80   06/01/25 (!) 128/96        Social History     Tobacco Use   Smoking Status Every Day    Types: Cigars   Smokeless Tobacco Never   Tobacco Comments    2 cigars/day

## 2025-06-16 ENCOUNTER — PRE-PROCEDURE TELEPHONE (OUTPATIENT)
Dept: INTERVENTIONAL RADIOLOGY/VASCULAR | Age: 60
End: 2025-06-16

## 2025-06-16 NOTE — PROGRESS NOTES
[facility]   Electrophysiology Follow up   Date: 6/18/2025  I had the privilege of visiting Adithya Watts in the office.     CC: Device check    HPI: Adithya Watts is a 59 y.o. male history of non compliance with medications, Asthma,  uncontrolled HTN, HLD Systolic heart failure,  alcohol and smokes.      Hospitalized 2/24-27/2020 for acute sHF with LVEF of 15-20%, diuresed; uncontrolled HTN due to running out of insurance and stopping his medications; ABRAN on CKD. entresto 4/14/2021    3/24/2023 S/p Single ICD       Interval History:  History of Present Illness  The patient presents for a follow-up visit. He has a history of congestive heart failure and hypertension. A single lead ICD was implanted in March 2023. His ejection fraction was 15-20% in 2021, with normal coronary arteries reported in the same year. In March 2025, he experienced one episode of nonsustained ventricular tachycardia lasting 1 second. Currently, his OptiVol is elevated, and he is experiencing shortness of breath and facial swelling.    He reports a generally stable condition since his last visit. He notes mild facial swelling, which he attributes to a recent change in his medication regimen.    He is scheduled for a lung biopsy due to the detection of a small mass in one of his lungs.    MEDICATIONS  Carvedilol, Farxiga, Entresto, digoxin, eplerenone    Assessment & Plan         Chronic Systolic Heart failure and cardiomyopathy              -3/24/2023 s/p single lead    -Interrogation today shows:11.2 years remaining,  <0.1%, Underlying VS 92 bpm, presenting VS, 1 NSVT 5/19/2025, lasting 1 second, Optivol elevated   - LVEF 02/25/2020 15-20%              - LVEF  09/14/2021 15-20%               - Continue GDMT with Lasix 40 mg, entresto 97/103 BID, carvedilol 25 mg BID, and farxiga 10 mg daily               - Cath 02/2020 - normal coronaries               - Follows with Heart Failure              - Echo 2/8/2023 was reviewed by me and

## 2025-06-18 ENCOUNTER — HOSPITAL ENCOUNTER (OUTPATIENT)
Dept: GENERAL RADIOLOGY | Age: 60
Discharge: HOME | End: 2025-06-18
Payer: COMMERCIAL

## 2025-06-18 ENCOUNTER — CLINICAL SUPPORT (OUTPATIENT)
Dept: CARDIOLOGY CLINIC | Age: 60
End: 2025-06-18

## 2025-06-18 ENCOUNTER — HOSPITAL ENCOUNTER (OUTPATIENT)
Dept: CT IMAGING | Age: 60
Discharge: HOME | End: 2025-06-18
Payer: COMMERCIAL

## 2025-06-18 ENCOUNTER — OFFICE VISIT (OUTPATIENT)
Dept: CARDIOLOGY CLINIC | Age: 60
End: 2025-06-18

## 2025-06-18 VITALS
WEIGHT: 156 LBS | HEIGHT: 70 IN | BODY MASS INDEX: 22.33 KG/M2 | DIASTOLIC BLOOD PRESSURE: 78 MMHG | SYSTOLIC BLOOD PRESSURE: 142 MMHG | OXYGEN SATURATION: 99 % | HEART RATE: 95 BPM

## 2025-06-18 VITALS
OXYGEN SATURATION: 99 % | TEMPERATURE: 97.6 F | SYSTOLIC BLOOD PRESSURE: 157 MMHG | DIASTOLIC BLOOD PRESSURE: 99 MMHG | RESPIRATION RATE: 16 BRPM | HEART RATE: 86 BPM

## 2025-06-18 DIAGNOSIS — I42.8 NICM (NONISCHEMIC CARDIOMYOPATHY) (HCC): ICD-10-CM

## 2025-06-18 DIAGNOSIS — Z95.810 ICD (IMPLANTABLE CARDIOVERTER-DEFIBRILLATOR), SINGLE, IN SITU: Primary | ICD-10-CM

## 2025-06-18 DIAGNOSIS — I42.0 DILATED CARDIOMYOPATHY (HCC): ICD-10-CM

## 2025-06-18 DIAGNOSIS — I50.22 CHRONIC SYSTOLIC HEART FAILURE (HCC): Primary | ICD-10-CM

## 2025-06-18 DIAGNOSIS — R91.1 PULMONARY NODULE: ICD-10-CM

## 2025-06-18 DIAGNOSIS — Z95.810 ICD (IMPLANTABLE CARDIOVERTER-DEFIBRILLATOR), SINGLE, IN SITU: ICD-10-CM

## 2025-06-18 DIAGNOSIS — I10 BENIGN ESSENTIAL HTN: ICD-10-CM

## 2025-06-18 LAB
ANION GAP SERPL CALCULATED.3IONS-SCNC: 10 MMOL/L (ref 3–16)
BUN SERPL-MCNC: 13 MG/DL (ref 7–20)
CALCIUM SERPL-MCNC: 9 MG/DL (ref 8.3–10.6)
CHLORIDE SERPL-SCNC: 103 MMOL/L (ref 99–110)
CO2 SERPL-SCNC: 26 MMOL/L (ref 21–32)
CREAT SERPL-MCNC: 1.3 MG/DL (ref 0.9–1.3)
DEPRECATED RDW RBC AUTO: 16.1 % (ref 12.4–15.4)
GFR SERPLBLD CREATININE-BSD FMLA CKD-EPI: 63 ML/MIN/{1.73_M2}
GLUCOSE SERPL-MCNC: 94 MG/DL (ref 70–99)
HCT VFR BLD AUTO: 43.7 % (ref 40.5–52.5)
HGB BLD-MCNC: 14.3 G/DL (ref 13.5–17.5)
INR PPP: 1.22 (ref 0.86–1.14)
MCH RBC QN AUTO: 31.7 PG (ref 26–34)
MCHC RBC AUTO-ENTMCNC: 32.8 G/DL (ref 31–36)
MCV RBC AUTO: 96.6 FL (ref 80–100)
PLATELET # BLD AUTO: 97 K/UL (ref 135–450)
PMV BLD AUTO: 10.3 FL (ref 5–10.5)
POTASSIUM SERPL-SCNC: 4.2 MMOL/L (ref 3.5–5.1)
PROTHROMBIN TIME: 15.7 SEC (ref 12.1–14.9)
RBC # BLD AUTO: 4.52 M/UL (ref 4.2–5.9)
SODIUM SERPL-SCNC: 139 MMOL/L (ref 136–145)
WBC # BLD AUTO: 5.9 K/UL (ref 4–11)

## 2025-06-18 PROCEDURE — 88312 SPECIAL STAINS GROUP 1: CPT

## 2025-06-18 PROCEDURE — 6360000002 HC RX W HCPCS: Performed by: INTERNAL MEDICINE

## 2025-06-18 PROCEDURE — 32408 CORE NDL BX LNG/MED PERQ: CPT

## 2025-06-18 PROCEDURE — 7100000010 HC PHASE II RECOVERY - FIRST 15 MIN: Performed by: INTERNAL MEDICINE

## 2025-06-18 PROCEDURE — 88341 IMHCHEM/IMCYTCHM EA ADD ANTB: CPT

## 2025-06-18 PROCEDURE — 36415 COLL VENOUS BLD VENIPUNCTURE: CPT

## 2025-06-18 PROCEDURE — 71045 X-RAY EXAM CHEST 1 VIEW: CPT

## 2025-06-18 PROCEDURE — 85027 COMPLETE CBC AUTOMATED: CPT

## 2025-06-18 PROCEDURE — 80048 BASIC METABOLIC PNL TOTAL CA: CPT

## 2025-06-18 PROCEDURE — 7100000011 HC PHASE II RECOVERY - ADDTL 15 MIN: Performed by: INTERNAL MEDICINE

## 2025-06-18 PROCEDURE — 88305 TISSUE EXAM BY PATHOLOGIST: CPT

## 2025-06-18 PROCEDURE — 88342 IMHCHEM/IMCYTCHM 1ST ANTB: CPT

## 2025-06-18 PROCEDURE — 85610 PROTHROMBIN TIME: CPT

## 2025-06-18 RX ORDER — FENTANYL CITRATE 50 UG/ML
INJECTION, SOLUTION INTRAMUSCULAR; INTRAVENOUS PRN
Status: COMPLETED | OUTPATIENT
Start: 2025-06-18 | End: 2025-06-18

## 2025-06-18 RX ORDER — MIDAZOLAM HYDROCHLORIDE 1 MG/ML
INJECTION, SOLUTION INTRAMUSCULAR; INTRAVENOUS PRN
Status: COMPLETED | OUTPATIENT
Start: 2025-06-18 | End: 2025-06-18

## 2025-06-18 RX ADMIN — MIDAZOLAM 1 MG: 1 INJECTION INTRAMUSCULAR; INTRAVENOUS at 11:07

## 2025-06-18 RX ADMIN — FENTANYL CITRATE 50 MCG: 50 INJECTION, SOLUTION INTRAMUSCULAR; INTRAVENOUS at 11:07

## 2025-06-18 RX ADMIN — FENTANYL CITRATE 50 MCG: 50 INJECTION, SOLUTION INTRAMUSCULAR; INTRAVENOUS at 10:58

## 2025-06-18 RX ADMIN — MIDAZOLAM 1 MG: 1 INJECTION INTRAMUSCULAR; INTRAVENOUS at 10:58

## 2025-06-18 ASSESSMENT — PAIN - FUNCTIONAL ASSESSMENT: PAIN_FUNCTIONAL_ASSESSMENT: 0-10

## 2025-06-18 NOTE — DISCHARGE INSTRUCTIONS
Lung Biopsy Discharge Instructions       Follow up with any questions, concerns, results, and an appointment after your procedure in the time period recommended.       Rest quietly for 24 hours, no physical activity.  Avoid straining, lifting or strenuous physical activity for 2 weeks.  No airplane flights for 2 weeks.  Avoid coughing for 24 hours.  Call your physician for any questions regarding your activity.  Return to the emergency room for any shortness of breath. Be sure to tell the staff you had a lung biopsy.    Resume your regular diet.    -------------------------------------------------------------------------------------------------------------    Discharge Instructions for Lung Biopsy  You had a procedure called lung biopsy. Your doctor used a special needle to collect a small amount of tissue or fluid from your lung to examine it for signs of damage or disease. Lung biopsies are performed when lung disease is suspected, to rule out cancer or determine what a mass might be. Here's what to do following the procedure.  Home Care  Don’t drive for 24 to 48 hours after the procedure.  Remove the bandage covering the biopsy site 24 to 48 hours after the procedure.  Don’t shower for 24 hours after the biopsy. If you wish, you may wash yourself with a sponge or washcloth. When you are able to shower, don’t scrub the site. Gently wash the area and pat it dry.  Avoid coughing for 24 hours.  Don’t lift anything heavier than 10 pounds for 3 to 4 days after the procedure.  Ask your doctor when you can return to work. Most patients are able to go back to work within 24 to 48 hours of the procedure.  Do not smoke, and try to avoid anyone who is smoking.  Follow-Up  Make a follow-up appointment as directed by our staff with the physician who ordered the procedure  When to Call Your Doctor  Call your doctor right away if you have any of the following:  Exhaustion or extreme weakness  Coughing up blood  Sudden or

## 2025-06-18 NOTE — PROGRESS NOTES
Pt chest xray negative for pneumothorax. Pt informed he was ready to go home. Pt given discharge instructions and states no questions.  Pt states he does not have a ride home and would drive himself. Notified Dr. Pate, who was okay with pt going home in an uber. Ride arranged, pt safely transported off unit with all belongings. Peripheral IV removed, intact, bleeding controlled.

## 2025-06-18 NOTE — PROGRESS NOTES
Pt received from IR s/p R lung bx. Pt denies pain, resting comfortable in bed. Pt arouses easily to voice. Pt dressing to right mid posterior back clean dry and intact, surrounding tissue is soft. Pt call light in reach, updated on plan of care.

## 2025-06-18 NOTE — PRE SEDATION
Sedation Pre-Procedure Note    Patient Name: Adithya Watts  YOB: 1965  Medical Record Number: [id number]  Date:  6/18/25  Time: 10:36 AM    Indication:  Right lung nodule biopsy    Consent: I have discussed with the patient and/or the patient representative the indication, alternatives, and the possible risks and/or complications of the planned procedure and the anesthesia methods. The patient and/or patient representative appear to understand and agree to proceed.    Vital Signs:   Vitals:    06/18/25 1022   BP: (!) 133/90   Pulse: 92   Resp: 16   Temp: 97.5 °F (36.4 °C)   SpO2: 99%       Past Medical History:  Past Medical History:   Diagnosis Date    Acute on chronic systolic (congestive) heart failure (HCC) 1/21/2022    Acute systolic CHF (congestive heart failure), NYHA class 3 (HCC) 2/27/2020    Asthma        Past Surgical History:  Past Surgical History:   Procedure Laterality Date    HERNIA REPAIR         Medications:   Current Outpatient Medications   Medication Sig Dispense Refill    furosemide (LASIX) 20 MG tablet Take 2 tablets by mouth daily And an extra 20 mg with increased sob (Patient taking differently: Take 2-3 tablets by mouth daily Take 2 tablets by mouth daily  And an extra tablet with increased sob) 30 tablet 0    digoxin (LANOXIN) 125 MCG tablet Take 1 tablet by mouth three times a week (Patient taking differently: Take 1 tablet by mouth Every Monday, Wednesday, and Friday) 15 tablet 0    FARXIGA 10 MG tablet Take 1 tablet by mouth every morning (Patient taking differently: Take 1 tablet by mouth daily) 90 tablet 1    albuterol sulfate HFA (VENTOLIN HFA) 108 (90 Base) MCG/ACT inhaler Inhale 2 puffs into the lungs 4 times daily as needed for Wheezing 18 g 0    sacubitril-valsartan (ENTRESTO)  MG per tablet Take 1 tablet by mouth 2 times daily 180 tablet 0    atorvastatin (LIPITOR) 40 MG tablet Take 1 tablet by mouth daily (Patient taking differently: Take 1 tablet by mouth

## 2025-06-18 NOTE — PROGRESS NOTES
Pt arrives to preprocedure area in stable condition from home. Vital signs stable. Assessement completed per flowsheet. IV patent. Procedure explained to patient who states understanding. Questions answered. Consent signed.       Electronically signed by Whitney Deng RN on 6/18/2025 at 10:27 AM

## 2025-06-20 ENCOUNTER — RESULTS FOLLOW-UP (OUTPATIENT)
Dept: PULMONOLOGY | Age: 60
End: 2025-06-20

## 2025-06-25 ENCOUNTER — OFFICE VISIT (OUTPATIENT)
Dept: PULMONOLOGY | Age: 60
End: 2025-06-25
Payer: COMMERCIAL

## 2025-06-25 VITALS
HEART RATE: 72 BPM | SYSTOLIC BLOOD PRESSURE: 126 MMHG | OXYGEN SATURATION: 95 % | WEIGHT: 155.6 LBS | BODY MASS INDEX: 22.28 KG/M2 | DIASTOLIC BLOOD PRESSURE: 64 MMHG | HEIGHT: 70 IN

## 2025-06-25 DIAGNOSIS — R91.1 PULMONARY NODULE: Primary | ICD-10-CM

## 2025-06-25 DIAGNOSIS — J44.9 COPD, SEVERITY TO BE DETERMINED (HCC): ICD-10-CM

## 2025-06-25 PROCEDURE — 4004F PT TOBACCO SCREEN RCVD TLK: CPT | Performed by: INTERNAL MEDICINE

## 2025-06-25 PROCEDURE — 99214 OFFICE O/P EST MOD 30 MIN: CPT | Performed by: INTERNAL MEDICINE

## 2025-06-25 PROCEDURE — 3074F SYST BP LT 130 MM HG: CPT | Performed by: INTERNAL MEDICINE

## 2025-06-25 PROCEDURE — 3078F DIAST BP <80 MM HG: CPT | Performed by: INTERNAL MEDICINE

## 2025-06-25 PROCEDURE — 3023F SPIROM DOC REV: CPT | Performed by: INTERNAL MEDICINE

## 2025-06-25 PROCEDURE — G8427 DOCREV CUR MEDS BY ELIG CLIN: HCPCS | Performed by: INTERNAL MEDICINE

## 2025-06-25 PROCEDURE — 3017F COLORECTAL CA SCREEN DOC REV: CPT | Performed by: INTERNAL MEDICINE

## 2025-06-25 PROCEDURE — G8420 CALC BMI NORM PARAMETERS: HCPCS | Performed by: INTERNAL MEDICINE

## 2025-06-25 RX ORDER — LEVOFLOXACIN 750 MG/1
750 TABLET, FILM COATED ORAL DAILY
Qty: 10 TABLET | Refills: 0 | Status: SHIPPED | OUTPATIENT
Start: 2025-06-25 | End: 2025-07-05

## 2025-06-25 NOTE — PROGRESS NOTES
[facility]  Consult Note  Kareem Perdomo MD       April Gallego   YOB: 1965    Date of Visit:  2025    Assessment/Plan:  1. Pulmonary nodule  I reviewed CT imaging with the patient during today's visit.  He has a 2.5 cm spiculated right lower lobe pulmonary nodule with some central cavitation.  This is adjacent to the pleural surface.  In comparison to imaging from , this lesion is new.    Given his history of hemoptysis, weight loss and fatigue the lesion is worrisome for the possibility of lung cancer.  We discussed options to proceed including serial CT imaging, PET scan and biopsy.  The patient would like to take an aggressive approach and prefers to proceed with biopsy.  I discussed IR needle biopsy versus navigational bronchoscopic biopsy.  Given the location of the lesion, IR biopsy is a reasonable approach with a low risk of complications.  I did discuss complications such as bleeding and pneumothorax.  The patient is agreeable to IR biopsy and I placed an order at today's visit for this.    [facility]                                        [address]                                        [place]  [address]                                        Fax [phone]   [phone]   Department of Pathology   FINAL SURGICAL PATHOLOGY REPORT   Patient Name:  APRIL GALLEGO                   Accession No:  [id number]    Age Sex:   1965    59 Y / M       Location:      [facility]   Account No:    [id number]                  Collected:     2025   Med Rec No:    [id number]                 Received:      2025   Attend Phys:   KAREEM PERDOMO MD        Completed:     2025   Perform Phys:  KAREEM PERDOMO MD             FINAL DIAGNOSIS:     Right lower lobe lung nodule, biopsies:   - Fragments of benign lung tissue with patchy necrosis and evidence of   previous hemorrhage.   - Negative for granulomas or

## 2025-07-02 ENCOUNTER — TELEPHONE (OUTPATIENT)
Dept: CARDIAC REHAB | Age: 60
End: 2025-07-02

## 2025-07-02 NOTE — TELEPHONE ENCOUNTER
Attempted to call patient to remind of cardiac rehab appointment tomorrow 7/3/2025 at 1500 , no answer and unable to leave message.

## 2025-07-09 ENCOUNTER — TELEPHONE (OUTPATIENT)
Dept: CARDIAC REHAB | Age: 60
End: 2025-07-09

## 2025-07-10 ENCOUNTER — HOSPITAL ENCOUNTER (OUTPATIENT)
Dept: CARDIAC REHAB | Age: 60
Setting detail: THERAPIES SERIES
Discharge: HOME | End: 2025-07-10
Payer: COMMERCIAL

## 2025-07-10 VITALS
RESPIRATION RATE: 16 BRPM | BODY MASS INDEX: 22.1 KG/M2 | WEIGHT: 154.4 LBS | HEART RATE: 98 BPM | SYSTOLIC BLOOD PRESSURE: 124 MMHG | DIASTOLIC BLOOD PRESSURE: 80 MMHG | HEIGHT: 70 IN

## 2025-07-10 PROCEDURE — 93798 PHYS/QHP OP CAR RHAB W/ECG: CPT

## 2025-07-10 ASSESSMENT — PATIENT HEALTH QUESTIONNAIRE - PHQ9
SUM OF ALL RESPONSES TO PHQ QUESTIONS 1-9: 3
2. FEELING DOWN, DEPRESSED OR HOPELESS: NOT AT ALL
9. THOUGHTS THAT YOU WOULD BE BETTER OFF DEAD, OR OF HURTING YOURSELF: NOT AT ALL
1. LITTLE INTEREST OR PLEASURE IN DOING THINGS: NOT AT ALL
6. FEELING BAD ABOUT YOURSELF - OR THAT YOU ARE A FAILURE OR HAVE LET YOURSELF OR YOUR FAMILY DOWN: SEVERAL DAYS
SUM OF ALL RESPONSES TO PHQ QUESTIONS 1-9: 3
SUM OF ALL RESPONSES TO PHQ QUESTIONS 1-9: 3
8. MOVING OR SPEAKING SO SLOWLY THAT OTHER PEOPLE COULD HAVE NOTICED. OR THE OPPOSITE, BEING SO FIGETY OR RESTLESS THAT YOU HAVE BEEN MOVING AROUND A LOT MORE THAN USUAL: NOT AT ALL
5. POOR APPETITE OR OVEREATING: NOT AT ALL
10. IF YOU CHECKED OFF ANY PROBLEMS, HOW DIFFICULT HAVE THESE PROBLEMS MADE IT FOR YOU TO DO YOUR WORK, TAKE CARE OF THINGS AT HOME, OR GET ALONG WITH OTHER PEOPLE: NOT DIFFICULT AT ALL
3. TROUBLE FALLING OR STAYING ASLEEP: SEVERAL DAYS
4. FEELING TIRED OR HAVING LITTLE ENERGY: SEVERAL DAYS
SUM OF ALL RESPONSES TO PHQ QUESTIONS 1-9: 3
7. TROUBLE CONCENTRATING ON THINGS, SUCH AS READING THE NEWSPAPER OR WATCHING TELEVISION: NOT AT ALL

## 2025-07-10 ASSESSMENT — EJECTION FRACTION: EF_VALUE: 15

## 2025-07-10 NOTE — CARDIO/PULMONARY
[facility] Cardiac Rehabilitation Initial Evaluation    Adithya Watts       1965     [id number]    Share medical information:  Yes - Gladis Watts (mother):  [phone]    Cardiac History    EF:  15-20  CHF - last admission:      Physical Assessment     General Appearance   Height:  177.8 cm (5' 10\")  Weight:  70 kg (154 lb 6.4 oz) (154.4 lb)   BMI:  22.2  Skin color: Skin is warm, dry and appropriate for ethnicity.    Cardiovascular Assessment  BP Sittin/80  Sittin/80 (left arm)  Standin/90 (rightr arm)  Heart rate:   98 Monitor   Heart sounds: Exceptions to WDL Regular S1, S2, No adventitious heart sounds    Respiratory Assessment  Resp rate: 16    SpO2:    Quality/Effort:   Respirations are regular and easy.  Lungs are clear bilaterally.  No increased work of breathing noted.  Sleep Apnea:  No  CPAP  No  Oxygen  No     Sleeping Habits:  States that has no issues with sleep.  Uses 2 pillows only     Edema:  No      Orthopedic/Exercise Limitations:  No      Pain:   Do you have pain?:  no       Fall Risk Assessment  Outpatient population: Not applicable  History of falling with or without injury: No  Use of ambulatory aid: No  Difficulty walking/impaired gait: No  Numbness in feet: No  Vision changes: No  Dizziness: No  Shortness of breath: Yes  Current medications include but not limited to: ACE, ARB, Beta  Blocker  Other fall risk : No  Outpatient fall risk intervention strategies: Fall risk education provided    Abuse / Neglect  Physical/behavioral signs of abuse/neglect   No    Do you feel safe at home   Yes    Advanced Directives  Patient has Advanced Directives:  No  Patient given Advanced Directive pack:  Declined    Vaccinations  Influenza (annual):  No  Pneumonia:  No    PT. Arrived to Cardiopulmonary rehab for the initial interview and evaluation for beginning Cardiac rehab.  Reviewed and discussed insurance benefits, pt v/u.  PMHX and PSHX as well as home

## 2025-07-14 ENCOUNTER — HOSPITAL ENCOUNTER (OUTPATIENT)
Dept: CARDIAC REHAB | Age: 60
Setting detail: THERAPIES SERIES
Discharge: HOME | End: 2025-07-14
Payer: COMMERCIAL

## 2025-07-14 PROCEDURE — 93798 PHYS/QHP OP CAR RHAB W/ECG: CPT

## 2025-07-16 ENCOUNTER — HOSPITAL ENCOUNTER (OUTPATIENT)
Dept: CARDIAC REHAB | Age: 60
Setting detail: THERAPIES SERIES
Discharge: HOME | End: 2025-07-16
Payer: COMMERCIAL

## 2025-07-16 PROCEDURE — 93798 PHYS/QHP OP CAR RHAB W/ECG: CPT

## 2025-07-18 ENCOUNTER — HOSPITAL ENCOUNTER (OUTPATIENT)
Dept: CARDIAC REHAB | Age: 60
Setting detail: THERAPIES SERIES
Discharge: HOME | End: 2025-07-18
Payer: COMMERCIAL

## 2025-07-21 ENCOUNTER — HOSPITAL ENCOUNTER (OUTPATIENT)
Dept: CARDIAC REHAB | Age: 60
Setting detail: THERAPIES SERIES
Discharge: HOME | End: 2025-07-21
Payer: COMMERCIAL

## 2025-07-21 PROCEDURE — 93798 PHYS/QHP OP CAR RHAB W/ECG: CPT

## 2025-07-23 ENCOUNTER — HOSPITAL ENCOUNTER (OUTPATIENT)
Dept: CARDIAC REHAB | Age: 60
Setting detail: THERAPIES SERIES
Discharge: HOME | End: 2025-07-23
Payer: COMMERCIAL

## 2025-07-23 PROCEDURE — 93798 PHYS/QHP OP CAR RHAB W/ECG: CPT

## 2025-07-25 ENCOUNTER — HOSPITAL ENCOUNTER (OUTPATIENT)
Dept: CARDIAC REHAB | Age: 60
Setting detail: THERAPIES SERIES
End: 2025-07-25
Payer: COMMERCIAL

## 2025-07-28 ENCOUNTER — HOSPITAL ENCOUNTER (OUTPATIENT)
Dept: CARDIAC REHAB | Age: 60
Setting detail: THERAPIES SERIES
Discharge: HOME | End: 2025-07-28
Payer: COMMERCIAL

## 2025-07-28 PROCEDURE — 93798 PHYS/QHP OP CAR RHAB W/ECG: CPT

## 2025-07-30 ENCOUNTER — HOSPITAL ENCOUNTER (EMERGENCY)
Age: 60
Discharge: HOME | End: 2025-07-30
Payer: COMMERCIAL

## 2025-07-30 ENCOUNTER — APPOINTMENT (OUTPATIENT)
Dept: CT IMAGING | Age: 60
End: 2025-07-30
Payer: COMMERCIAL

## 2025-07-30 ENCOUNTER — APPOINTMENT (OUTPATIENT)
Dept: GENERAL RADIOLOGY | Age: 60
End: 2025-07-30
Payer: COMMERCIAL

## 2025-07-30 ENCOUNTER — HOSPITAL ENCOUNTER (OUTPATIENT)
Dept: CARDIAC REHAB | Age: 60
Setting detail: THERAPIES SERIES
End: 2025-07-30
Payer: COMMERCIAL

## 2025-07-30 VITALS
HEART RATE: 78 BPM | DIASTOLIC BLOOD PRESSURE: 86 MMHG | RESPIRATION RATE: 23 BRPM | WEIGHT: 160 LBS | BODY MASS INDEX: 22.9 KG/M2 | HEIGHT: 70 IN | OXYGEN SATURATION: 94 % | SYSTOLIC BLOOD PRESSURE: 121 MMHG | TEMPERATURE: 97.3 F

## 2025-07-30 DIAGNOSIS — R06.00 DYSPNEA AND RESPIRATORY ABNORMALITIES: Primary | ICD-10-CM

## 2025-07-30 DIAGNOSIS — R06.89 DYSPNEA AND RESPIRATORY ABNORMALITIES: Primary | ICD-10-CM

## 2025-07-30 DIAGNOSIS — J18.9 ATYPICAL PNEUMONIA: ICD-10-CM

## 2025-07-30 LAB
ALBUMIN SERPL-MCNC: 3.7 G/DL (ref 3.4–5)
ALBUMIN/GLOB SERPL: 1.5 {RATIO} (ref 1.1–2.2)
ALP SERPL-CCNC: 58 U/L (ref 40–129)
ALT SERPL-CCNC: 18 U/L (ref 10–40)
ALT SERPL-CCNC: ABNORMAL U/L (ref 10–40)
ANION GAP SERPL CALCULATED.3IONS-SCNC: 11 MMOL/L (ref 3–16)
AST SERPL-CCNC: 35 U/L (ref 15–37)
BASE EXCESS BLDV CALC-SCNC: -2.9 MMOL/L (ref -3–3)
BASOPHILS # BLD: 0.1 K/UL (ref 0–0.2)
BASOPHILS NFR BLD: 0.9 %
BILIRUB SERPL-MCNC: 0.9 MG/DL (ref 0–1)
BUN SERPL-MCNC: 16 MG/DL (ref 7–20)
CALCIUM SERPL-MCNC: 7.8 MG/DL (ref 8.3–10.6)
CHLORIDE SERPL-SCNC: 109 MMOL/L (ref 99–110)
CO2 BLDV-SCNC: 57 MMOL/L
CO2 SERPL-SCNC: 21 MMOL/L (ref 21–32)
COHGB MFR BLDV: 3.7 % (ref 0–1.5)
CREAT SERPL-MCNC: 1.4 MG/DL (ref 0.9–1.3)
DEPRECATED RDW RBC AUTO: 15.4 % (ref 12.4–15.4)
DO-HGB MFR BLDV: 7 %
EKG ATRIAL RATE: 86 BPM
EKG DIAGNOSIS: NORMAL
EKG P AXIS: 66 DEGREES
EKG P-R INTERVAL: 148 MS
EKG Q-T INTERVAL: 406 MS
EKG QRS DURATION: 106 MS
EKG QTC CALCULATION (BAZETT): 485 MS
EKG R AXIS: -59 DEGREES
EKG T AXIS: 97 DEGREES
EKG VENTRICULAR RATE: 86 BPM
EOSINOPHIL # BLD: 0.1 K/UL (ref 0–0.6)
EOSINOPHIL NFR BLD: 1.7 %
FLUAV RNA RESP QL NAA+PROBE: NOT DETECTED
FLUBV RNA RESP QL NAA+PROBE: NOT DETECTED
GFR SERPLBLD CREATININE-BSD FMLA CKD-EPI: 58 ML/MIN/{1.73_M2}
GLUCOSE SERPL-MCNC: 113 MG/DL (ref 70–99)
HCO3 BLDV-SCNC: 24 MMOL/L (ref 23–29)
HCT VFR BLD AUTO: 39.6 % (ref 40.5–52.5)
HGB BLD-MCNC: 13.7 G/DL (ref 13.5–17.5)
LYMPHOCYTES # BLD: 1.7 K/UL (ref 1–5.1)
LYMPHOCYTES NFR BLD: 30.3 %
MCH RBC QN AUTO: 32.7 PG (ref 26–34)
MCHC RBC AUTO-ENTMCNC: 34.7 G/DL (ref 31–36)
MCV RBC AUTO: 94 FL (ref 80–100)
METHGB MFR BLDV: 0.1 %
MONOCYTES # BLD: 0.4 K/UL (ref 0–1.3)
MONOCYTES NFR BLD: 7.1 %
NEUTROPHILS # BLD: 3.4 K/UL (ref 1.7–7.7)
NEUTROPHILS NFR BLD: 60 %
NT-PROBNP SERPL-MCNC: 4041 PG/ML (ref 0–124)
O2 CT VFR BLDV CALC: 17 VOL %
O2 THERAPY: ABNORMAL
PCO2 BLDV: 48.9 MMHG (ref 40–50)
PH BLDV: 7.3 [PH] (ref 7.35–7.45)
PLATELET # BLD AUTO: 79 K/UL (ref 135–450)
PLATELET BLD QL SMEAR: ABNORMAL
PMV BLD AUTO: 10.7 FL (ref 5–10.5)
PO2 BLDV: 74.3 MMHG (ref 25–40)
POTASSIUM SERPL-SCNC: 4.1 MMOL/L (ref 3.5–5.1)
POTASSIUM SERPL-SCNC: ABNORMAL MMOL/L (ref 3.5–5.1)
PROT SERPL-MCNC: 6.1 G/DL (ref 6.4–8.2)
RBC # BLD AUTO: 4.21 M/UL (ref 4.2–5.9)
REASON FOR REJECTION: NORMAL
REJECTED TEST: NORMAL
SAO2 % BLDV: 93 %
SARS-COV-2 RNA RESP QL NAA+PROBE: NOT DETECTED
SLIDE REVIEW: ABNORMAL
SODIUM SERPL-SCNC: 141 MMOL/L (ref 136–145)
TROPONIN, HIGH SENSITIVITY: 20 NG/L (ref 0–22)
TROPONIN, HIGH SENSITIVITY: 23 NG/L (ref 0–22)
TROPONIN, HIGH SENSITIVITY: 27 NG/L (ref 0–22)
WBC # BLD AUTO: 5.7 K/UL (ref 4–11)

## 2025-07-30 PROCEDURE — 80053 COMPREHEN METABOLIC PANEL: CPT

## 2025-07-30 PROCEDURE — 99285 EMERGENCY DEPT VISIT HI MDM: CPT

## 2025-07-30 PROCEDURE — 71045 X-RAY EXAM CHEST 1 VIEW: CPT

## 2025-07-30 PROCEDURE — 71260 CT THORAX DX C+: CPT

## 2025-07-30 PROCEDURE — 82803 BLOOD GASES ANY COMBINATION: CPT

## 2025-07-30 PROCEDURE — 93005 ELECTROCARDIOGRAM TRACING: CPT | Performed by: INTERNAL MEDICINE

## 2025-07-30 PROCEDURE — 87636 SARSCOV2 & INF A&B AMP PRB: CPT

## 2025-07-30 PROCEDURE — 84484 ASSAY OF TROPONIN QUANT: CPT

## 2025-07-30 PROCEDURE — 93010 ELECTROCARDIOGRAM REPORT: CPT | Performed by: INTERNAL MEDICINE

## 2025-07-30 PROCEDURE — 83880 ASSAY OF NATRIURETIC PEPTIDE: CPT

## 2025-07-30 PROCEDURE — 85025 COMPLETE CBC W/AUTO DIFF WBC: CPT

## 2025-07-30 PROCEDURE — 6360000004 HC RX CONTRAST MEDICATION: Performed by: PHYSICIAN ASSISTANT

## 2025-07-30 RX ORDER — ALBUTEROL SULFATE 90 UG/1
2 INHALANT RESPIRATORY (INHALATION) 4 TIMES DAILY PRN
Qty: 18 G | Refills: 0 | Status: SHIPPED | OUTPATIENT
Start: 2025-07-30

## 2025-07-30 RX ORDER — IOPAMIDOL 755 MG/ML
75 INJECTION, SOLUTION INTRAVASCULAR
Status: COMPLETED | OUTPATIENT
Start: 2025-07-30 | End: 2025-07-30

## 2025-07-30 RX ORDER — DOXYCYCLINE HYCLATE 100 MG
100 TABLET ORAL 2 TIMES DAILY
Qty: 20 TABLET | Refills: 0 | Status: SHIPPED | OUTPATIENT
Start: 2025-07-30 | End: 2025-07-30

## 2025-07-30 RX ORDER — PREDNISONE 10 MG/1
60 TABLET ORAL DAILY
Qty: 30 TABLET | Refills: 0 | Status: SHIPPED | OUTPATIENT
Start: 2025-07-30 | End: 2025-07-30

## 2025-07-30 RX ORDER — PREDNISONE 10 MG/1
60 TABLET ORAL DAILY
Qty: 30 TABLET | Refills: 0 | Status: SHIPPED | OUTPATIENT
Start: 2025-07-30 | End: 2025-08-04

## 2025-07-30 RX ORDER — ALBUTEROL SULFATE 90 UG/1
2 INHALANT RESPIRATORY (INHALATION) 4 TIMES DAILY PRN
Qty: 18 G | Refills: 0 | Status: SHIPPED | OUTPATIENT
Start: 2025-07-30 | End: 2025-07-30

## 2025-07-30 RX ORDER — DOXYCYCLINE HYCLATE 100 MG
100 TABLET ORAL 2 TIMES DAILY
Qty: 20 TABLET | Refills: 0 | Status: SHIPPED | OUTPATIENT
Start: 2025-07-30 | End: 2025-08-09

## 2025-07-30 RX ADMIN — IOPAMIDOL 75 ML: 755 INJECTION, SOLUTION INTRAVENOUS at 11:01

## 2025-07-30 ASSESSMENT — ENCOUNTER SYMPTOMS
COUGH: 1
DIARRHEA: 0
NAUSEA: 0
CHEST TIGHTNESS: 1
SHORTNESS OF BREATH: 1
VOMITING: 0
WHEEZING: 0
ABDOMINAL PAIN: 0
RHINORRHEA: 0

## 2025-07-30 ASSESSMENT — LIFESTYLE VARIABLES
HOW MANY STANDARD DRINKS CONTAINING ALCOHOL DO YOU HAVE ON A TYPICAL DAY: PATIENT DOES NOT DRINK
HOW OFTEN DO YOU HAVE A DRINK CONTAINING ALCOHOL: NEVER

## 2025-07-30 ASSESSMENT — PAIN - FUNCTIONAL ASSESSMENT: PAIN_FUNCTIONAL_ASSESSMENT: NONE - DENIES PAIN

## 2025-07-30 NOTE — ED NOTES
SpO2 while ambulating dropped to 93% at the lowest point but then came back up to 98% within less than 1 minute.

## 2025-07-30 NOTE — PROGRESS NOTES
Pharmacy Home Medication Reconciliation Note    A medication reconciliation has been completed for Adithya Watts 1965    Pharmacy: [facility] [address], [place], [place]  Information provided by: patient and fill history    The patient's home medication list is as follows:  No current facility-administered medications on file prior to encounter.     Current Outpatient Medications on File Prior to Encounter   Medication Sig Dispense Refill    furosemide (LASIX) 20 MG tablet Take 2 tablets by mouth daily And an extra 20 mg with increased sob (Patient taking differently: Take 2 tablets by mouth daily Take 2 tablets by mouth daily. Patient can take an additional tablet for SOB.) 30 tablet 0    digoxin (LANOXIN) 125 MCG tablet Take 1 tablet by mouth three times a week (Patient taking differently: Take 1 tablet by mouth Every Monday, Wednesday, and Friday) 15 tablet 0    FARXIGA 10 MG tablet Take 1 tablet by mouth every morning 90 tablet 1    albuterol sulfate HFA (VENTOLIN HFA) 108 (90 Base) MCG/ACT inhaler Inhale 2 puffs into the lungs 4 times daily as needed for Wheezing 18 g 0    sacubitril-valsartan (ENTRESTO)  MG per tablet Take 1 tablet by mouth 2 times daily 180 tablet 0    atorvastatin (LIPITOR) 40 MG tablet Take 1 tablet by mouth daily (Patient taking differently: Take 1 tablet by mouth Daily with lunch) 90 tablet 1    carvedilol (COREG) 25 MG tablet Take 1 tablet by mouth 2 times daily 180 tablet 0    eplerenone (INSPRA) 25 MG tablet Take 1 tablet by mouth daily 90 tablet 0    vitamin D3 (CHOLECALCIFEROL) 25 MCG (1000 UT) TABS tablet Take 1 tablet by mouth daily 90 tablet 1     Of note, patient last took medication last night.    Timing of last doses updated.    Thank you,  Tam Vitale, Pharmacy Intern

## 2025-07-30 NOTE — ED PROVIDER NOTES
CALL  [facility] tel. [phone],  Rejected Test k,alt/Called to: nicole humphrey, 07/30/2025 09:56, by ALEME   COMPREHENSIVE METABOLIC PANEL - Abnormal; Notable for the following components:    Glucose 113 (*)     Creatinine 1.4 (*)     Est, Glom Filt Rate 58 (*)     Calcium 7.8 (*)     Total Protein 6.1 (*)     All other components within normal limits    Narrative:     CALL  [facility] tel. [phone],  Rejected Test k,alt/Called to: nicole humphrey, 07/30/2025 09:56, by ALEME   TROPONIN - Abnormal; Notable for the following components:    Troponin, High Sensitivity 27 (*)     All other components within normal limits   BRAIN NATRIURETIC PEPTIDE - Abnormal; Notable for the following components:    NT Pro-BNP 4,041 (*)     All other components within normal limits    Narrative:     CALL  [facility] tel. [phone],  Rejected Test k,alt/Called to: nicole humphrey, 07/30/2025 09:56, by ALEME   BLOOD GAS, VENOUS - Abnormal; Notable for the following components:    pH, Damien 7.298 (*)     PO2, Damien 74.3 (*)     Carboxyhemoglobin 3.7 (*)     All other components within normal limits   TROPONIN - Abnormal; Notable for the following components:    Troponin, High Sensitivity 23 (*)     All other components within normal limits   COVID-19 & INFLUENZA COMBO   TROPONIN    Narrative:     CALL  [facility] tel. [phone],  Rejected Test k,alt/Called to: nicole humphrey, 07/30/2025 09:56, by ALEME   SPECIMEN REJECTION    Narrative:     CALL  [facility] tel. [phone],  Rejected Test k,alt/Called to: nicole humphrey, 07/30/2025 09:56, by ALEME   POTASSIUM   ALT       When ordered only abnormal lab results are displayed. All other labs were within normal range or not returned as of this dictation.    EKG: When ordered, EKG's are interpreted by the Emergency Department Physician in the absence of a cardiologist.  Please see their note for interpretation of EKG.    RADIOLOGY:   Non-plain film images such as

## 2025-08-01 ENCOUNTER — HOSPITAL ENCOUNTER (OUTPATIENT)
Dept: CARDIAC REHAB | Age: 60
Setting detail: THERAPIES SERIES
Discharge: HOME | End: 2025-08-01

## 2025-08-08 ENCOUNTER — TELEPHONE (OUTPATIENT)
Dept: CARDIOLOGY CLINIC | Age: 60
End: 2025-08-08

## 2025-08-08 RX ORDER — ATORVASTATIN CALCIUM 40 MG/1
40 TABLET, FILM COATED ORAL DAILY
Qty: 90 TABLET | Refills: 1 | Status: SHIPPED | OUTPATIENT
Start: 2025-08-08

## 2025-08-08 RX ORDER — DIGOXIN 125 MCG
125 TABLET ORAL
Qty: 15 TABLET | Refills: 0 | Status: SHIPPED | OUTPATIENT
Start: 2025-08-08

## 2025-08-29 ENCOUNTER — RESULTS FOLLOW-UP (OUTPATIENT)
Dept: CARDIOLOGY CLINIC | Age: 60
End: 2025-08-29

## 2025-08-29 ENCOUNTER — HOSPITAL ENCOUNTER (OUTPATIENT)
Age: 60
Discharge: HOME | End: 2025-08-31
Payer: COMMERCIAL

## 2025-08-29 ENCOUNTER — HOSPITAL ENCOUNTER (OUTPATIENT)
Dept: GENERAL RADIOLOGY | Age: 60
Discharge: HOME | End: 2025-08-29
Payer: COMMERCIAL

## 2025-08-29 ENCOUNTER — HOSPITAL ENCOUNTER (OUTPATIENT)
Age: 60
Discharge: HOME | End: 2025-08-29
Payer: COMMERCIAL

## 2025-08-29 ENCOUNTER — OFFICE VISIT (OUTPATIENT)
Dept: CARDIOLOGY CLINIC | Age: 60
End: 2025-08-29

## 2025-08-29 VITALS
BODY MASS INDEX: 22.9 KG/M2 | SYSTOLIC BLOOD PRESSURE: 131 MMHG | DIASTOLIC BLOOD PRESSURE: 93 MMHG | HEIGHT: 70 IN | WEIGHT: 160 LBS

## 2025-08-29 VITALS
HEART RATE: 89 BPM | WEIGHT: 152 LBS | HEIGHT: 70 IN | BODY MASS INDEX: 21.76 KG/M2 | DIASTOLIC BLOOD PRESSURE: 80 MMHG | SYSTOLIC BLOOD PRESSURE: 106 MMHG | OXYGEN SATURATION: 99 %

## 2025-08-29 DIAGNOSIS — I50.22 CHRONIC SYSTOLIC HEART FAILURE (HCC): ICD-10-CM

## 2025-08-29 DIAGNOSIS — F10.10 ALCOHOL ABUSE: ICD-10-CM

## 2025-08-29 DIAGNOSIS — E55.9 VITAMIN D DEFICIENCY: ICD-10-CM

## 2025-08-29 DIAGNOSIS — I10 ESSENTIAL HYPERTENSION: ICD-10-CM

## 2025-08-29 DIAGNOSIS — I50.23 ACUTE ON CHRONIC SYSTOLIC HEART FAILURE, NYHA CLASS 4 (HCC): Primary | ICD-10-CM

## 2025-08-29 DIAGNOSIS — Z95.810 ICD (IMPLANTABLE CARDIOVERTER-DEFIBRILLATOR), SINGLE, IN SITU: ICD-10-CM

## 2025-08-29 LAB
ALBUMIN SERPL-MCNC: 4.3 G/DL (ref 3.4–5)
ALBUMIN/GLOB SERPL: 1.5 {RATIO} (ref 1.1–2.2)
ALP SERPL-CCNC: 77 U/L (ref 40–129)
ALT SERPL-CCNC: 26 U/L (ref 10–40)
ANION GAP SERPL CALCULATED.3IONS-SCNC: 12 MMOL/L (ref 3–16)
AST SERPL-CCNC: 24 U/L (ref 15–37)
BILIRUB SERPL-MCNC: 0.8 MG/DL (ref 0–1)
BUN SERPL-MCNC: 14 MG/DL (ref 7–20)
CALCIUM SERPL-MCNC: 9.1 MG/DL (ref 8.3–10.6)
CHLORIDE SERPL-SCNC: 105 MMOL/L (ref 99–110)
CO2 SERPL-SCNC: 24 MMOL/L (ref 21–32)
CREAT SERPL-MCNC: 1.3 MG/DL (ref 0.9–1.3)
DEPRECATED RDW RBC AUTO: 15.3 % (ref 12.4–15.4)
ECHO AO ASC DIAM: 2.8 CM
ECHO AO ASCENDING AORTA INDEX: 1.47 CM/M2
ECHO AO ROOT DIAM: 2.9 CM
ECHO AO ROOT INDEX: 1.53 CM/M2
ECHO AV AREA PEAK VELOCITY: 2.4 CM2
ECHO AV AREA VTI: 1.9 CM2
ECHO AV AREA/BSA PEAK VELOCITY: 1.3 CM2/M2
ECHO AV AREA/BSA VTI: 1 CM2/M2
ECHO AV MEAN GRADIENT: 2 MMHG
ECHO AV MEAN GRADIENT: 2 MMHG
ECHO AV MEAN VELOCITY: 0.7 M/S
ECHO AV PEAK GRADIENT: 4 MMHG
ECHO AV PEAK VELOCITY: 1 M/S
ECHO AV VELOCITY RATIO: 0.8
ECHO AV VTI: 15.4 CM
ECHO BSA: 1.89 M2
ECHO EST RA PRESSURE: 3 MMHG
ECHO IVC INSP: 0.4 CM
ECHO IVC PROX: 1.9 CM
ECHO LA AREA 2C: 34.8 CM2
ECHO LA AREA 4C: 34.1 CM2
ECHO LA MAJOR AXIS: 6.4 CM
ECHO LA MINOR AXIS: 6.8 CM
ECHO LA VOL BP: 153 ML (ref 18–58)
ECHO LA VOL MOD A2C: 148 ML (ref 18–58)
ECHO LA VOL MOD A4C: 151 ML (ref 18–58)
ECHO LA VOL/BSA BIPLANE: 81 ML/M2 (ref 16–34)
ECHO LA VOLUME INDEX MOD A2C: 78 ML/M2 (ref 16–34)
ECHO LA VOLUME INDEX MOD A4C: 79 ML/M2 (ref 16–34)
ECHO LV E' LATERAL VELOCITY: 4.06 CM/S
ECHO LV E' SEPTAL VELOCITY: 2.62 CM/S
ECHO LV EDV A2C: 280 ML
ECHO LV EDV A4C: 195 ML
ECHO LV EDV INDEX A4C: 103 ML/M2
ECHO LV EDV NDEX A2C: 147 ML/M2
ECHO LV EF PHYSICIAN: 20 %
ECHO LV EJECTION FRACTION A2C: 14 %
ECHO LV EJECTION FRACTION A4C: 12 %
ECHO LV EJECTION FRACTION BIPLANE: 20 % (ref 55–100)
ECHO LV ESV A2C: 243 ML
ECHO LV ESV A4C: 171 ML
ECHO LV ESV INDEX A2C: 128 ML/M2
ECHO LV ESV INDEX A4C: 90 ML/M2
ECHO LV FRACTIONAL SHORTENING: 9 % (ref 28–44)
ECHO LV INTERNAL DIMENSION DIASTOLE INDEX: 3.42 CM/M2
ECHO LV INTERNAL DIMENSION DIASTOLIC: 6.5 CM (ref 4.2–5.9)
ECHO LV INTERNAL DIMENSION SYSTOLIC INDEX: 3.11 CM/M2
ECHO LV INTERNAL DIMENSION SYSTOLIC: 5.9 CM
ECHO LV IVSD: 0.9 CM (ref 0.6–1)
ECHO LV MASS 2D: 301.3 G (ref 88–224)
ECHO LV MASS INDEX 2D: 158.6 G/M2 (ref 49–115)
ECHO LV POSTERIOR WALL DIASTOLIC: 1.2 CM (ref 0.6–1)
ECHO LV RELATIVE WALL THICKNESS RATIO: 0.37
ECHO LVOT AREA: 3.1 CM2
ECHO LVOT AV VTI INDEX: 0.62
ECHO LVOT DIAM: 2 CM
ECHO LVOT MEAN GRADIENT: 1 MMHG
ECHO LVOT PEAK GRADIENT: 3 MMHG
ECHO LVOT PEAK VELOCITY: 0.8 M/S
ECHO LVOT STROKE VOLUME INDEX: 15.7 ML/M2
ECHO LVOT SV: 29.8 ML
ECHO LVOT VTI: 9.5 CM
ECHO MV A VELOCITY: 0.29 M/S
ECHO MV AREA VTI: 1.4 CM2
ECHO MV E VELOCITY: 0.91 M/S
ECHO MV E/A RATIO: 3.14
ECHO MV E/E' LATERAL: 22.41
ECHO MV E/E' RATIO (AVERAGED): 28.57
ECHO MV E/E' SEPTAL: 34.73
ECHO MV LVOT VTI INDEX: 2.22
ECHO MV MAX VELOCITY: 1.2 M/S
ECHO MV MEAN GRADIENT: 1 MMHG
ECHO MV MEAN VELOCITY: 0.5 M/S
ECHO MV PEAK GRADIENT: 6 MMHG
ECHO MV REGURGITANT ALIASING (NYQUIST) VELOCITY: 28 CM/S
ECHO MV REGURGITANT PEAK GRADIENT: 121 MMHG
ECHO MV REGURGITANT PEAK VELOCITY: 5.5 M/S
ECHO MV REGURGITANT RADIUS PISA: 0.6 CM
ECHO MV VTI: 21.1 CM
ECHO PV MAX VELOCITY: 0.5 M/S
ECHO PV PEAK GRADIENT: 1 MMHG
ECHO RA AREA 4C: 29.5 CM2
ECHO RA END SYSTOLIC VOLUME APICAL 4 CHAMBER INDEX BSA: 58 ML/M2
ECHO RA VOLUME: 111 ML
ECHO RIGHT VENTRICULAR SYSTOLIC PRESSURE (RVSP): 46 MMHG
ECHO RV BASAL DIMENSION: 4.8 CM
ECHO RV FRACTIONAL AREA CHANGE: 28 %
ECHO RV FREE WALL PEAK S': 9.5 CM/S
ECHO RV LONGITUDINAL DIMENSION: 10.1 CM
ECHO RV MID DIMENSION: 2.7 CM
ECHO RV TAPSE: 1.8 CM (ref 1.7–?)
ECHO TV REGURGITANT MAX VELOCITY: 3.28 M/S
ECHO TV REGURGITANT PEAK GRADIENT: 43 MMHG
GFR SERPLBLD CREATININE-BSD FMLA CKD-EPI: 63 ML/MIN/{1.73_M2}
GLUCOSE SERPL-MCNC: 83 MG/DL (ref 70–99)
HCT VFR BLD AUTO: 45.3 % (ref 40.5–52.5)
HGB BLD-MCNC: 15.3 G/DL (ref 13.5–17.5)
MCH RBC QN AUTO: 32 PG (ref 26–34)
MCHC RBC AUTO-ENTMCNC: 33.9 G/DL (ref 31–36)
MCV RBC AUTO: 94.5 FL (ref 80–100)
NT-PROBNP SERPL-MCNC: 2456 PG/ML (ref 0–124)
PLATELET # BLD AUTO: 77 K/UL (ref 135–450)
PLATELET BLD QL SMEAR: ABNORMAL
PMV BLD AUTO: 11.2 FL (ref 5–10.5)
POTASSIUM SERPL-SCNC: 4.4 MMOL/L (ref 3.5–5.1)
PROT SERPL-MCNC: 7.1 G/DL (ref 6.4–8.2)
RBC # BLD AUTO: 4.79 M/UL (ref 4.2–5.9)
SLIDE REVIEW: ABNORMAL
SODIUM SERPL-SCNC: 141 MMOL/L (ref 136–145)
TSH SERPL DL<=0.005 MIU/L-ACNC: 2.53 UIU/ML (ref 0.27–4.2)
WBC # BLD AUTO: 4.1 K/UL (ref 4–11)

## 2025-08-29 PROCEDURE — 36415 COLL VENOUS BLD VENIPUNCTURE: CPT

## 2025-08-29 PROCEDURE — 80053 COMPREHEN METABOLIC PANEL: CPT

## 2025-08-29 PROCEDURE — 84443 ASSAY THYROID STIM HORMONE: CPT

## 2025-08-29 PROCEDURE — 83880 ASSAY OF NATRIURETIC PEPTIDE: CPT

## 2025-08-29 PROCEDURE — 93306 TTE W/DOPPLER COMPLETE: CPT | Performed by: INTERNAL MEDICINE

## 2025-08-29 PROCEDURE — 93306 TTE W/DOPPLER COMPLETE: CPT

## 2025-08-29 PROCEDURE — 85027 COMPLETE CBC AUTOMATED: CPT

## 2025-08-29 PROCEDURE — 71046 X-RAY EXAM CHEST 2 VIEWS: CPT

## 2025-08-29 RX ORDER — DAPAGLIFLOZIN 10 MG/1
10 TABLET, FILM COATED ORAL EVERY MORNING
Qty: 90 TABLET | Refills: 1 | Status: SHIPPED | OUTPATIENT
Start: 2025-08-29

## 2025-08-29 RX ORDER — CHOLECALCIFEROL (VITAMIN D3) 25 MCG
1000 TABLET ORAL DAILY
Qty: 90 TABLET | Refills: 1 | Status: SHIPPED | OUTPATIENT
Start: 2025-08-29

## 2025-08-29 RX ORDER — DIGOXIN 125 MCG
125 TABLET ORAL
Qty: 15 TABLET | Refills: 4 | Status: SHIPPED | OUTPATIENT
Start: 2025-08-29

## 2025-08-29 RX ORDER — FUROSEMIDE 20 MG/1
40 TABLET ORAL DAILY
Qty: 180 TABLET | Refills: 1 | Status: SHIPPED | OUTPATIENT
Start: 2025-08-29

## 2025-09-02 ENCOUNTER — APPOINTMENT (OUTPATIENT)
Dept: GENERAL RADIOLOGY | Age: 60
DRG: 205 | End: 2025-09-02
Payer: COMMERCIAL

## 2025-09-02 ENCOUNTER — HOSPITAL ENCOUNTER (INPATIENT)
Age: 60
LOS: 1 days | Discharge: LEFT BEFORE DISCHARGE | DRG: 205 | End: 2025-09-03
Attending: INTERNAL MEDICINE | Admitting: INTERNAL MEDICINE
Payer: COMMERCIAL

## 2025-09-02 DIAGNOSIS — R93.1 ABNORMAL ECHOCARDIOGRAM: ICD-10-CM

## 2025-09-02 DIAGNOSIS — I50.9 ACUTE EXACERBATION OF CHRONIC HEART FAILURE (HCC): ICD-10-CM

## 2025-09-02 DIAGNOSIS — R94.31 ABNORMAL EKG: ICD-10-CM

## 2025-09-02 DIAGNOSIS — I42.9 CARDIOMYOPATHY (HCC): ICD-10-CM

## 2025-09-02 DIAGNOSIS — R06.02 SHORTNESS OF BREATH: Primary | ICD-10-CM

## 2025-09-02 LAB
ALBUMIN SERPL-MCNC: 4.2 G/DL (ref 3.4–5)
ALBUMIN/GLOB SERPL: 1.4 {RATIO} (ref 1.1–2.2)
ALP SERPL-CCNC: 78 U/L (ref 40–129)
ALT SERPL-CCNC: 29 U/L (ref 10–40)
ALT SERPL-CCNC: ABNORMAL U/L (ref 10–40)
ANION GAP SERPL CALCULATED.3IONS-SCNC: 9 MMOL/L (ref 3–16)
APTT BLD: 31.7 SEC (ref 22.8–35.8)
AST SERPL-CCNC: 43 U/L (ref 15–37)
BASOPHILS # BLD: 0 K/UL (ref 0–0.2)
BASOPHILS NFR BLD: 0.7 %
BILIRUB SERPL-MCNC: 0.7 MG/DL (ref 0–1)
BUN SERPL-MCNC: 10 MG/DL (ref 7–20)
CALCIUM SERPL-MCNC: 9.1 MG/DL (ref 8.3–10.6)
CHLORIDE SERPL-SCNC: 104 MMOL/L (ref 99–110)
CO2 SERPL-SCNC: 27 MMOL/L (ref 21–32)
CREAT SERPL-MCNC: 1.3 MG/DL (ref 0.9–1.3)
DEPRECATED RDW RBC AUTO: 15.6 % (ref 12.4–15.4)
EOSINOPHIL # BLD: 0.1 K/UL (ref 0–0.6)
EOSINOPHIL NFR BLD: 2.6 %
GFR SERPLBLD CREATININE-BSD FMLA CKD-EPI: 63 ML/MIN/{1.73_M2}
GLUCOSE SERPL-MCNC: 96 MG/DL (ref 70–99)
HCT VFR BLD AUTO: 45.8 % (ref 40.5–52.5)
HGB BLD-MCNC: 15.1 G/DL (ref 13.5–17.5)
INR PPP: 1.23 (ref 0.86–1.14)
LYMPHOCYTES # BLD: 1.9 K/UL (ref 1–5.1)
LYMPHOCYTES NFR BLD: 47.9 %
MCH RBC QN AUTO: 31.3 PG (ref 26–34)
MCHC RBC AUTO-ENTMCNC: 32.9 G/DL (ref 31–36)
MCV RBC AUTO: 95.1 FL (ref 80–100)
MONOCYTES # BLD: 0.4 K/UL (ref 0–1.3)
MONOCYTES NFR BLD: 11 %
NEUTROPHILS # BLD: 1.5 K/UL (ref 1.7–7.7)
NEUTROPHILS NFR BLD: 37.8 %
NT-PROBNP SERPL-MCNC: 1664 PG/ML (ref 0–124)
PLATELET # BLD AUTO: 107 K/UL (ref 135–450)
PMV BLD AUTO: 10.1 FL (ref 5–10.5)
POTASSIUM SERPL-SCNC: 4.4 MMOL/L (ref 3.5–5.1)
POTASSIUM SERPL-SCNC: ABNORMAL MMOL/L (ref 3.5–5.1)
PROT SERPL-MCNC: 7.1 G/DL (ref 6.4–8.2)
PROTHROMBIN TIME: 15.8 SEC (ref 12.1–14.9)
RBC # BLD AUTO: 4.81 M/UL (ref 4.2–5.9)
REASON FOR REJECTION: NORMAL
REASON FOR REJECTION: NORMAL
REJECTED TEST: NORMAL
REJECTED TEST: NORMAL
SODIUM SERPL-SCNC: 140 MMOL/L (ref 136–145)
TROPONIN, HIGH SENSITIVITY: 19 NG/L (ref 0–22)
TROPONIN, HIGH SENSITIVITY: 24 NG/L (ref 0–22)
WBC # BLD AUTO: 3.9 K/UL (ref 4–11)

## 2025-09-02 PROCEDURE — 99285 EMERGENCY DEPT VISIT HI MDM: CPT

## 2025-09-02 PROCEDURE — 84484 ASSAY OF TROPONIN QUANT: CPT

## 2025-09-02 PROCEDURE — 85610 PROTHROMBIN TIME: CPT

## 2025-09-02 PROCEDURE — 1200000000 HC SEMI PRIVATE

## 2025-09-02 PROCEDURE — 36415 COLL VENOUS BLD VENIPUNCTURE: CPT

## 2025-09-02 PROCEDURE — 83880 ASSAY OF NATRIURETIC PEPTIDE: CPT

## 2025-09-02 PROCEDURE — 80053 COMPREHEN METABOLIC PANEL: CPT

## 2025-09-02 PROCEDURE — 2500000003 HC RX 250 WO HCPCS: Performed by: INTERNAL MEDICINE

## 2025-09-02 PROCEDURE — 6370000000 HC RX 637 (ALT 250 FOR IP): Performed by: INTERNAL MEDICINE

## 2025-09-02 PROCEDURE — 85025 COMPLETE CBC W/AUTO DIFF WBC: CPT

## 2025-09-02 PROCEDURE — 93005 ELECTROCARDIOGRAM TRACING: CPT | Performed by: INTERNAL MEDICINE

## 2025-09-02 PROCEDURE — 71045 X-RAY EXAM CHEST 1 VIEW: CPT

## 2025-09-02 PROCEDURE — 85730 THROMBOPLASTIN TIME PARTIAL: CPT

## 2025-09-02 PROCEDURE — 94760 N-INVAS EAR/PLS OXIMETRY 1: CPT

## 2025-09-02 RX ORDER — ACETAMINOPHEN 325 MG/1
650 TABLET ORAL EVERY 6 HOURS PRN
Status: DISCONTINUED | OUTPATIENT
Start: 2025-09-02 | End: 2025-09-03 | Stop reason: HOSPADM

## 2025-09-02 RX ORDER — POLYETHYLENE GLYCOL 3350 17 G/17G
17 POWDER, FOR SOLUTION ORAL DAILY PRN
Status: DISCONTINUED | OUTPATIENT
Start: 2025-09-02 | End: 2025-09-03 | Stop reason: HOSPADM

## 2025-09-02 RX ORDER — SODIUM CHLORIDE 0.9 % (FLUSH) 0.9 %
5-40 SYRINGE (ML) INJECTION EVERY 12 HOURS SCHEDULED
Status: DISCONTINUED | OUTPATIENT
Start: 2025-09-02 | End: 2025-09-03 | Stop reason: HOSPADM

## 2025-09-02 RX ORDER — SACUBITRIL AND VALSARTAN 97; 103 MG/1; MG/1
1 TABLET ORAL 2 TIMES DAILY
Status: DISCONTINUED | OUTPATIENT
Start: 2025-09-02 | End: 2025-09-03 | Stop reason: HOSPADM

## 2025-09-02 RX ORDER — EPLERENONE 25 MG/1
25 TABLET ORAL DAILY
Status: DISCONTINUED | OUTPATIENT
Start: 2025-09-02 | End: 2025-09-02

## 2025-09-02 RX ORDER — ATORVASTATIN CALCIUM 40 MG/1
40 TABLET, FILM COATED ORAL NIGHTLY
Status: DISCONTINUED | OUTPATIENT
Start: 2025-09-02 | End: 2025-09-03 | Stop reason: HOSPADM

## 2025-09-02 RX ORDER — FUROSEMIDE 40 MG/1
40 TABLET ORAL DAILY
Status: DISCONTINUED | OUTPATIENT
Start: 2025-09-02 | End: 2025-09-03 | Stop reason: HOSPADM

## 2025-09-02 RX ORDER — SODIUM CHLORIDE 9 MG/ML
INJECTION, SOLUTION INTRAVENOUS PRN
Status: DISCONTINUED | OUTPATIENT
Start: 2025-09-02 | End: 2025-09-03 | Stop reason: HOSPADM

## 2025-09-02 RX ORDER — DIGOXIN 125 MCG
125 TABLET ORAL
Status: DISCONTINUED | OUTPATIENT
Start: 2025-09-03 | End: 2025-09-03 | Stop reason: HOSPADM

## 2025-09-02 RX ORDER — MAGNESIUM SULFATE IN WATER 40 MG/ML
2000 INJECTION, SOLUTION INTRAVENOUS PRN
Status: DISCONTINUED | OUTPATIENT
Start: 2025-09-02 | End: 2025-09-03 | Stop reason: HOSPADM

## 2025-09-02 RX ORDER — SODIUM CHLORIDE 0.9 % (FLUSH) 0.9 %
5-40 SYRINGE (ML) INJECTION PRN
Status: DISCONTINUED | OUTPATIENT
Start: 2025-09-02 | End: 2025-09-03 | Stop reason: HOSPADM

## 2025-09-02 RX ORDER — CARVEDILOL 25 MG/1
25 TABLET ORAL 2 TIMES DAILY
Status: DISCONTINUED | OUTPATIENT
Start: 2025-09-02 | End: 2025-09-03 | Stop reason: HOSPADM

## 2025-09-02 RX ORDER — ENOXAPARIN SODIUM 100 MG/ML
40 INJECTION SUBCUTANEOUS DAILY
Status: DISCONTINUED | OUTPATIENT
Start: 2025-09-03 | End: 2025-09-03 | Stop reason: HOSPADM

## 2025-09-02 RX ORDER — ACETAMINOPHEN 650 MG/1
650 SUPPOSITORY RECTAL EVERY 6 HOURS PRN
Status: DISCONTINUED | OUTPATIENT
Start: 2025-09-02 | End: 2025-09-03 | Stop reason: HOSPADM

## 2025-09-02 RX ORDER — ALBUTEROL SULFATE 90 UG/1
2 INHALANT RESPIRATORY (INHALATION) 4 TIMES DAILY PRN
Status: DISCONTINUED | OUTPATIENT
Start: 2025-09-02 | End: 2025-09-03 | Stop reason: HOSPADM

## 2025-09-02 RX ADMIN — EMPAGLIFLOZIN 10 MG: 10 TABLET, FILM COATED ORAL at 17:49

## 2025-09-02 RX ADMIN — FUROSEMIDE 40 MG: 40 TABLET ORAL at 17:49

## 2025-09-02 RX ADMIN — SODIUM CHLORIDE, PRESERVATIVE FREE 10 ML: 5 INJECTION INTRAVENOUS at 21:53

## 2025-09-02 RX ADMIN — ATORVASTATIN CALCIUM 40 MG: 40 TABLET, FILM COATED ORAL at 21:51

## 2025-09-02 RX ADMIN — CARVEDILOL 25 MG: 25 TABLET, FILM COATED ORAL at 21:51

## 2025-09-02 RX ADMIN — SACUBITRIL AND VALSARTAN 1 TABLET: 97; 103 TABLET, FILM COATED ORAL at 21:51

## 2025-09-02 ASSESSMENT — PAIN SCALES - GENERAL
PAINLEVEL_OUTOF10: 0
PAINLEVEL_OUTOF10: 0

## 2025-09-02 ASSESSMENT — PAIN DESCRIPTION - PAIN TYPE
TYPE: ACUTE PAIN
TYPE: ACUTE PAIN

## 2025-09-02 ASSESSMENT — PAIN DESCRIPTION - DESCRIPTORS: DESCRIPTORS: DISCOMFORT

## 2025-09-02 ASSESSMENT — PAIN DESCRIPTION - LOCATION
LOCATION: CHEST
LOCATION: CHEST

## 2025-09-02 ASSESSMENT — ENCOUNTER SYMPTOMS
GASTROINTESTINAL NEGATIVE: 1
SHORTNESS OF BREATH: 1
BACK PAIN: 1

## 2025-09-02 ASSESSMENT — PAIN - FUNCTIONAL ASSESSMENT: PAIN_FUNCTIONAL_ASSESSMENT: 0-10

## 2025-09-03 ENCOUNTER — TELEPHONE (OUTPATIENT)
Dept: CARDIOLOGY CLINIC | Age: 60
End: 2025-09-03

## 2025-09-03 VITALS
DIASTOLIC BLOOD PRESSURE: 66 MMHG | HEART RATE: 75 BPM | SYSTOLIC BLOOD PRESSURE: 136 MMHG | TEMPERATURE: 98 F | RESPIRATION RATE: 18 BRPM | HEIGHT: 70 IN | BODY MASS INDEX: 20.96 KG/M2 | OXYGEN SATURATION: 97 % | WEIGHT: 146.4 LBS

## 2025-09-03 DIAGNOSIS — I42.9 CARDIOMYOPATHY, UNSPECIFIED TYPE (HCC): Primary | ICD-10-CM

## 2025-09-03 LAB
ANION GAP SERPL CALCULATED.3IONS-SCNC: 10 MMOL/L (ref 3–16)
BASOPHILS # BLD: 0 K/UL (ref 0–0.2)
BASOPHILS NFR BLD: 0.5 %
BUN SERPL-MCNC: 12 MG/DL (ref 7–20)
CALCIUM SERPL-MCNC: 8.7 MG/DL (ref 8.3–10.6)
CHLORIDE SERPL-SCNC: 104 MMOL/L (ref 99–110)
CO2 SERPL-SCNC: 24 MMOL/L (ref 21–32)
CREAT SERPL-MCNC: 1.1 MG/DL (ref 0.9–1.3)
DEPRECATED RDW RBC AUTO: 15.2 % (ref 12.4–15.4)
EKG ATRIAL RATE: 75 BPM
EKG DIAGNOSIS: NORMAL
EKG P AXIS: 65 DEGREES
EKG P-R INTERVAL: 150 MS
EKG Q-T INTERVAL: 422 MS
EKG QRS DURATION: 108 MS
EKG QTC CALCULATION (BAZETT): 471 MS
EKG R AXIS: -61 DEGREES
EKG T AXIS: 105 DEGREES
EKG VENTRICULAR RATE: 75 BPM
EOSINOPHIL # BLD: 0.1 K/UL (ref 0–0.6)
EOSINOPHIL NFR BLD: 3 %
GFR SERPLBLD CREATININE-BSD FMLA CKD-EPI: 77 ML/MIN/{1.73_M2}
GLUCOSE SERPL-MCNC: 98 MG/DL (ref 70–99)
HCT VFR BLD AUTO: 41.6 % (ref 40.5–52.5)
HGB BLD-MCNC: 13.8 G/DL (ref 13.5–17.5)
LYMPHOCYTES # BLD: 2.1 K/UL (ref 1–5.1)
LYMPHOCYTES NFR BLD: 48.6 %
MCH RBC QN AUTO: 31.3 PG (ref 26–34)
MCHC RBC AUTO-ENTMCNC: 33.1 G/DL (ref 31–36)
MCV RBC AUTO: 94.3 FL (ref 80–100)
MONOCYTES # BLD: 0.4 K/UL (ref 0–1.3)
MONOCYTES NFR BLD: 9.9 %
NEUTROPHILS # BLD: 1.6 K/UL (ref 1.7–7.7)
NEUTROPHILS NFR BLD: 38 %
PLATELET # BLD AUTO: 96 K/UL (ref 135–450)
PMV BLD AUTO: 10 FL (ref 5–10.5)
POTASSIUM SERPL-SCNC: 4 MMOL/L (ref 3.5–5.1)
RBC # BLD AUTO: 4.41 M/UL (ref 4.2–5.9)
SODIUM SERPL-SCNC: 138 MMOL/L (ref 136–145)
WBC # BLD AUTO: 4.3 K/UL (ref 4–11)

## 2025-09-03 PROCEDURE — 85025 COMPLETE CBC W/AUTO DIFF WBC: CPT

## 2025-09-03 PROCEDURE — 6370000000 HC RX 637 (ALT 250 FOR IP): Performed by: INTERNAL MEDICINE

## 2025-09-03 PROCEDURE — 2500000003 HC RX 250 WO HCPCS: Performed by: INTERNAL MEDICINE

## 2025-09-03 PROCEDURE — 36415 COLL VENOUS BLD VENIPUNCTURE: CPT

## 2025-09-03 PROCEDURE — 93010 ELECTROCARDIOGRAM REPORT: CPT | Performed by: INTERNAL MEDICINE

## 2025-09-03 PROCEDURE — 80048 BASIC METABOLIC PNL TOTAL CA: CPT

## 2025-09-03 RX ORDER — SODIUM CHLORIDE 0.9 % (FLUSH) 0.9 %
5-40 SYRINGE (ML) INJECTION PRN
Status: DISCONTINUED | OUTPATIENT
Start: 2025-09-03 | End: 2025-09-03 | Stop reason: HOSPADM

## 2025-09-03 RX ORDER — SODIUM CHLORIDE 9 MG/ML
INJECTION, SOLUTION INTRAVENOUS PRN
Status: DISCONTINUED | OUTPATIENT
Start: 2025-09-03 | End: 2025-09-03 | Stop reason: HOSPADM

## 2025-09-03 RX ORDER — SODIUM CHLORIDE 0.9 % (FLUSH) 0.9 %
5-40 SYRINGE (ML) INJECTION EVERY 12 HOURS SCHEDULED
Status: DISCONTINUED | OUTPATIENT
Start: 2025-09-03 | End: 2025-09-03 | Stop reason: HOSPADM

## 2025-09-03 RX ADMIN — EMPAGLIFLOZIN 10 MG: 10 TABLET, FILM COATED ORAL at 10:44

## 2025-09-03 RX ADMIN — FUROSEMIDE 40 MG: 40 TABLET ORAL at 10:44

## 2025-09-03 RX ADMIN — SODIUM CHLORIDE, PRESERVATIVE FREE 10 ML: 5 INJECTION INTRAVENOUS at 10:44

## 2025-09-03 RX ADMIN — CARVEDILOL 25 MG: 25 TABLET, FILM COATED ORAL at 10:44

## 2025-09-03 RX ADMIN — DIGOXIN 125 MCG: 125 TABLET ORAL at 10:44

## 2025-09-03 RX ADMIN — SACUBITRIL AND VALSARTAN 1 TABLET: 97; 103 TABLET, FILM COATED ORAL at 10:44

## 2025-09-03 ASSESSMENT — PAIN SCALES - GENERAL: PAINLEVEL_OUTOF10: 0

## 2025-09-04 ENCOUNTER — FOLLOWUP TELEPHONE ENCOUNTER (OUTPATIENT)
Dept: INTERNAL MEDICINE CLINIC | Age: 60
End: 2025-09-04